# Patient Record
Sex: MALE | Race: WHITE | NOT HISPANIC OR LATINO | Employment: PART TIME | URBAN - METROPOLITAN AREA
[De-identification: names, ages, dates, MRNs, and addresses within clinical notes are randomized per-mention and may not be internally consistent; named-entity substitution may affect disease eponyms.]

---

## 2020-02-15 ENCOUNTER — OFFICE VISIT (OUTPATIENT)
Dept: URGENT CARE | Facility: CLINIC | Age: 65
End: 2020-02-15
Payer: COMMERCIAL

## 2020-02-15 VITALS
HEIGHT: 73 IN | RESPIRATION RATE: 18 BRPM | TEMPERATURE: 98.4 F | BODY MASS INDEX: 31.14 KG/M2 | OXYGEN SATURATION: 99 % | SYSTOLIC BLOOD PRESSURE: 167 MMHG | WEIGHT: 235 LBS | DIASTOLIC BLOOD PRESSURE: 81 MMHG | HEART RATE: 73 BPM

## 2020-02-15 DIAGNOSIS — J06.9 VIRAL URI: Primary | ICD-10-CM

## 2020-02-15 PROCEDURE — 99203 OFFICE O/P NEW LOW 30 MIN: CPT | Performed by: EMERGENCY MEDICINE

## 2020-02-15 RX ORDER — LISINOPRIL 10 MG/1
TABLET ORAL
COMMUNITY
Start: 2020-02-04 | End: 2021-01-05 | Stop reason: HOSPADM

## 2020-02-15 RX ORDER — PREDNISONE 10 MG/1
TABLET ORAL
Qty: 27 TABLET | Refills: 0 | Status: SHIPPED | OUTPATIENT
Start: 2020-02-15 | End: 2021-01-02 | Stop reason: CLARIF

## 2020-02-15 RX ORDER — AZITHROMYCIN 250 MG/1
TABLET, FILM COATED ORAL
Qty: 6 TABLET | Refills: 0 | Status: SHIPPED | OUTPATIENT
Start: 2020-02-15 | End: 2020-02-15 | Stop reason: CLARIF

## 2020-02-15 NOTE — PATIENT INSTRUCTIONS
You have been diagnosed with a Viral Upper Respiratory infection and your symptoms should resolve over the next 7 to 10 days with the treatments recommended today   If they do not, it is possible that you have developed a bacterial infection and you should return  If you were to take the antibiotic while you are still in the viral stage, you will not get better any faster, but could kill off the good germs in your body as well as make the germs in you resistant to the antibiotic  Take an expectorant - guaifenesin should be the only ingredient - during the day, and the cough suppressant (ex  Robitussin DM or Tessalon) if needed at night only  Take Zinc 12 5 to 15 mg every 2 - 3 hrs while awake for the next few days   You may take Cold Harvinder (13 3 mg of Zinc) or split a 25 mg Zinc tablet or lozenge in two or a 50 mg into four to get the proper dose   The total daily dose of Zinc should exceed 75 mg per day  You may also take a decongestant like Sudafed, unless you have hypertension or cardiac disease  Hold any NSAID's like Ibuprofen (Advil), Naprosyn (Aleve), etc while on steroids like Medrol or Prednisone  If you are diabetic, you should also adhere strictly to your diet and monitor your blood sugar closely while on the steroids as discussed  Upper Respiratory Infection   AMBULATORY CARE:   An upper respiratory infection  is also called a common cold  It can affect your nose, throat, ears, and sinuses  Common signs and symptoms include the following:  Cold symptoms are usually worst for the first 3 to 5 days  You may have any of the following:  · Runny or stuffy nose    · Sneezing and coughing    · Sore throat or hoarseness    · Red, watery, and sore eyes    · Fatigue     · Chills and fever    · Headache, body aches, or sore muscles  Seek care immediately if:   · You have chest pain or trouble breathing  Contact your healthcare provider if:   · You have a fever over 102ºF (39°C)      · Your sore throat gets worse or you see white or yellow spots in your throat  · Your symptoms get worse after 3 to 5 days or your cold is not better in 14 days  · You have a rash anywhere on your skin  · You have large, tender lumps in your neck  · You have thick, green or yellow drainage from your nose  · You cough up thick yellow, green, or bloody mucus  · You have vomiting for more than 24 hours and cannot keep fluids down  · You have a bad earache  · You have questions or concerns about your condition or care  Treatment for a cold: There is no cure for the common cold  Colds are caused by viruses and do not get better with antibiotics  Most people get better in 7 to 14 days  You may continue to cough for 2 to 3 weeks  The following may help decrease your symptoms:  · Decongestants  help reduce nasal congestion and help you breathe more easily  If you take decongestant pills, they may make you feel restless or not able to sleep  Do not use decongestant sprays for more than a few days  · Cough suppressants  help reduce coughing  Ask your healthcare provider which type of cough medicine is best for you  · NSAIDs , such as ibuprofen, help decrease swelling, pain, and fever  NSAIDs can cause stomach bleeding or kidney problems in certain people  If you take blood thinner medicine, always ask your healthcare provider if NSAIDs are safe for you  Always read the medicine label and follow directions  · Acetaminophen  decreases pain and fever  It is available without a doctor's order  Ask how much to take and how often to take it  Follow directions  Read the labels of all other medicines you are using to see if they also contain acetaminophen, or ask your doctor or pharmacist  Acetaminophen can cause liver damage if not taken correctly  Do not use more than 4 grams (4,000 milligrams) total of acetaminophen in one day  Manage your cold:   · Rest as much as possible  Slowly start to do more each day  · Drink more liquids as directed  Liquids will help thin and loosen mucus so you can cough it up  Liquids will also help prevent dehydration  Liquids that help prevent dehydration include water, fruit juice, and broth  Do not drink liquids that contain caffeine  Caffeine can increase your risk for dehydration  Ask your healthcare provider how much liquid to drink each day  · Soothe a sore throat  Gargle with warm salt water  This helps your sore throat feel better  Make salt water by dissolving ¼ teaspoon salt in 1 cup warm water  You may also suck on hard candy or throat lozenges  You may use a sore throat spray  · Use a humidifier or vaporizer  Use a cool mist humidifier or a vaporizer to increase air moisture in your home  This may make it easier for you to breathe and help decrease your cough  · Use saline nasal drops as directed  These help relieve congestion  · Apply petroleum-based jelly around the outside of your nostrils  This can decrease irritation from blowing your nose  · Do not smoke  Nicotine and other chemicals in cigarettes and cigars can make your symptoms worse  They can also cause infections such as bronchitis or pneumonia  Ask your healthcare provider for information if you currently smoke and need help to quit  E-cigarettes or smokeless tobacco still contain nicotine  Talk to your healthcare provider before you use these products  Prevent spreading your cold to others:   · Try to stay away from other people during the first 2 to 3 days of your cold when it is more easily spread  · Do not share food or drinks  · Do not share hand towels with household members  · Wash your hands often, especially after you blow your nose  Turn away from other people and cover your mouth and nose with a tissue when you sneeze or cough  Follow up with your healthcare provider as directed:  Write down your questions so you remember to ask them during your visits     © 2017 [facility] Information is for End User's use only and may not be sold, redistributed or otherwise used for commercial purposes  All illustrations and images included in CareNotes® are the copyrighted property of [employer]  or [employer] Batista  The above information is an  only  It is not intended as medical advice for individual conditions or treatments  Talk to your doctor, nurse or pharmacist before following any medical regimen to see if it is safe and effective for you

## 2021-01-02 ENCOUNTER — HOSPITAL ENCOUNTER (INPATIENT)
Facility: HOSPITAL | Age: 66
LOS: 3 days | Discharge: HOME | DRG: 309 | End: 2021-01-05
Attending: EMERGENCY MEDICINE | Admitting: FAMILY MEDICINE
Payer: COMMERCIAL

## 2021-01-02 ENCOUNTER — APPOINTMENT (EMERGENCY)
Dept: RADIOLOGY | Facility: HOSPITAL | Age: 66
DRG: 309 | End: 2021-01-02
Payer: COMMERCIAL

## 2021-01-02 DIAGNOSIS — I48.92 ATRIAL FLUTTER (HCC): Primary | ICD-10-CM

## 2021-01-02 DIAGNOSIS — N39.0 UTI (URINARY TRACT INFECTION): ICD-10-CM

## 2021-01-02 DIAGNOSIS — G47.30 SLEEP APNEA, UNSPECIFIED TYPE: ICD-10-CM

## 2021-01-02 DIAGNOSIS — N30.00 ACUTE CYSTITIS WITHOUT HEMATURIA: ICD-10-CM

## 2021-01-02 DIAGNOSIS — I48.92 ATRIAL FLUTTER WITH RAPID VENTRICULAR RESPONSE (HCC): ICD-10-CM

## 2021-01-02 PROBLEM — I16.0 HYPERTENSIVE URGENCY: Status: ACTIVE | Noted: 2021-01-02

## 2021-01-02 LAB
ALBUMIN SERPL BCP-MCNC: 3.4 G/DL (ref 3.5–5)
ALP SERPL-CCNC: 54 U/L (ref 46–116)
ALT SERPL W P-5'-P-CCNC: 42 U/L (ref 12–78)
ANION GAP SERPL CALCULATED.3IONS-SCNC: 10 MMOL/L (ref 4–13)
APTT PPP: 32 SECONDS (ref 23–37)
AST SERPL W P-5'-P-CCNC: 24 U/L (ref 5–45)
BACTERIA UR QL AUTO: ABNORMAL /HPF
BASOPHILS # BLD AUTO: 0.02 THOUSANDS/ΜL (ref 0–0.1)
BASOPHILS NFR BLD AUTO: 0 % (ref 0–1)
BILIRUB SERPL-MCNC: 0.24 MG/DL (ref 0.2–1)
BILIRUB UR QL STRIP: NEGATIVE
BUN SERPL-MCNC: 18 MG/DL (ref 5–25)
CALCIUM ALBUM COR SERPL-MCNC: 8.8 MG/DL (ref 8.3–10.1)
CALCIUM SERPL-MCNC: 8.3 MG/DL (ref 8.3–10.1)
CHLORIDE SERPL-SCNC: 102 MMOL/L (ref 100–108)
CK SERPL-CCNC: 72 U/L (ref 39–308)
CLARITY UR: ABNORMAL
CO2 SERPL-SCNC: 25 MMOL/L (ref 21–32)
COLOR UR: YELLOW
CREAT SERPL-MCNC: 1 MG/DL (ref 0.6–1.3)
EOSINOPHIL # BLD AUTO: 0.17 THOUSAND/ΜL (ref 0–0.61)
EOSINOPHIL NFR BLD AUTO: 2 % (ref 0–6)
ERYTHROCYTE [DISTWIDTH] IN BLOOD BY AUTOMATED COUNT: 12.3 % (ref 11.6–15.1)
GFR SERPL CREATININE-BSD FRML MDRD: 78 ML/MIN/1.73SQ M
GLUCOSE SERPL-MCNC: 128 MG/DL (ref 65–140)
GLUCOSE UR STRIP-MCNC: NEGATIVE MG/DL
HCT VFR BLD AUTO: 39.2 % (ref 36.5–49.3)
HGB BLD-MCNC: 13.5 G/DL (ref 12–17)
HGB UR QL STRIP.AUTO: ABNORMAL
IMM GRANULOCYTES # BLD AUTO: 0.07 THOUSAND/UL (ref 0–0.2)
IMM GRANULOCYTES NFR BLD AUTO: 1 % (ref 0–2)
INR PPP: 1.09 (ref 0.84–1.19)
KETONES UR STRIP-MCNC: NEGATIVE MG/DL
LACTATE SERPL-SCNC: 1.6 MMOL/L (ref 0.5–2)
LEUKOCYTE ESTERASE UR QL STRIP: ABNORMAL
LYMPHOCYTES # BLD AUTO: 1.71 THOUSANDS/ΜL (ref 0.6–4.47)
LYMPHOCYTES NFR BLD AUTO: 16 % (ref 14–44)
MAGNESIUM SERPL-MCNC: 2.1 MG/DL (ref 1.6–2.6)
MCH RBC QN AUTO: 30.3 PG (ref 26.8–34.3)
MCHC RBC AUTO-ENTMCNC: 34.4 G/DL (ref 31.4–37.4)
MCV RBC AUTO: 88 FL (ref 82–98)
MONOCYTES # BLD AUTO: 0.92 THOUSAND/ΜL (ref 0.17–1.22)
MONOCYTES NFR BLD AUTO: 9 % (ref 4–12)
MUCOUS THREADS UR QL AUTO: ABNORMAL
NEUTROPHILS # BLD AUTO: 7.9 THOUSANDS/ΜL (ref 1.85–7.62)
NEUTS SEG NFR BLD AUTO: 72 % (ref 43–75)
NITRITE UR QL STRIP: POSITIVE
NON-SQ EPI CELLS URNS QL MICRO: ABNORMAL /HPF
NRBC BLD AUTO-RTO: 0 /100 WBCS
OTHER STN SPEC: ABNORMAL
PH UR STRIP.AUTO: 5.5 [PH]
PLATELET # BLD AUTO: 197 THOUSANDS/UL (ref 149–390)
PMV BLD AUTO: 10.8 FL (ref 8.9–12.7)
POTASSIUM SERPL-SCNC: 4.2 MMOL/L (ref 3.5–5.3)
PROT SERPL-MCNC: 7.2 G/DL (ref 6.4–8.2)
PROT UR STRIP-MCNC: ABNORMAL MG/DL
PROTHROMBIN TIME: 13.9 SECONDS (ref 11.6–14.5)
RBC # BLD AUTO: 4.46 MILLION/UL (ref 3.88–5.62)
RBC #/AREA URNS AUTO: ABNORMAL /HPF
SODIUM SERPL-SCNC: 137 MMOL/L (ref 136–145)
SP GR UR STRIP.AUTO: 1.02 (ref 1–1.03)
TROPONIN I SERPL-MCNC: <0.02 NG/ML
UROBILINOGEN UR QL STRIP.AUTO: 0.2 E.U./DL
WBC # BLD AUTO: 10.79 THOUSAND/UL (ref 4.31–10.16)
WBC #/AREA URNS AUTO: ABNORMAL /HPF

## 2021-01-02 PROCEDURE — 99285 EMERGENCY DEPT VISIT HI MDM: CPT

## 2021-01-02 PROCEDURE — 99223 1ST HOSP IP/OBS HIGH 75: CPT | Performed by: FAMILY MEDICINE

## 2021-01-02 PROCEDURE — 83735 ASSAY OF MAGNESIUM: CPT | Performed by: PHYSICIAN ASSISTANT

## 2021-01-02 PROCEDURE — 82550 ASSAY OF CK (CPK): CPT | Performed by: PHYSICIAN ASSISTANT

## 2021-01-02 PROCEDURE — 99285 EMERGENCY DEPT VISIT HI MDM: CPT | Performed by: PHYSICIAN ASSISTANT

## 2021-01-02 PROCEDURE — 71045 X-RAY EXAM CHEST 1 VIEW: CPT

## 2021-01-02 PROCEDURE — 85610 PROTHROMBIN TIME: CPT | Performed by: PHYSICIAN ASSISTANT

## 2021-01-02 PROCEDURE — 87086 URINE CULTURE/COLONY COUNT: CPT | Performed by: PHYSICIAN ASSISTANT

## 2021-01-02 PROCEDURE — 81001 URINALYSIS AUTO W/SCOPE: CPT | Performed by: PHYSICIAN ASSISTANT

## 2021-01-02 PROCEDURE — 84484 ASSAY OF TROPONIN QUANT: CPT | Performed by: PHYSICIAN ASSISTANT

## 2021-01-02 PROCEDURE — 36415 COLL VENOUS BLD VENIPUNCTURE: CPT | Performed by: PHYSICIAN ASSISTANT

## 2021-01-02 PROCEDURE — 96376 TX/PRO/DX INJ SAME DRUG ADON: CPT

## 2021-01-02 PROCEDURE — 96365 THER/PROPH/DIAG IV INF INIT: CPT

## 2021-01-02 PROCEDURE — 80053 COMPREHEN METABOLIC PANEL: CPT | Performed by: PHYSICIAN ASSISTANT

## 2021-01-02 PROCEDURE — 83605 ASSAY OF LACTIC ACID: CPT | Performed by: FAMILY MEDICINE

## 2021-01-02 PROCEDURE — 85730 THROMBOPLASTIN TIME PARTIAL: CPT | Performed by: PHYSICIAN ASSISTANT

## 2021-01-02 PROCEDURE — 93005 ELECTROCARDIOGRAM TRACING: CPT

## 2021-01-02 PROCEDURE — 96375 TX/PRO/DX INJ NEW DRUG ADDON: CPT

## 2021-01-02 PROCEDURE — 85025 COMPLETE CBC W/AUTO DIFF WBC: CPT | Performed by: PHYSICIAN ASSISTANT

## 2021-01-02 RX ORDER — PHENAZOPYRIDINE HYDROCHLORIDE 100 MG/1
100 TABLET, FILM COATED ORAL
Status: DISCONTINUED | OUTPATIENT
Start: 2021-01-02 | End: 2021-01-05 | Stop reason: HOSPADM

## 2021-01-02 RX ORDER — METOPROLOL TARTRATE 50 MG/1
50 TABLET, FILM COATED ORAL 3 TIMES DAILY
Status: DISCONTINUED | OUTPATIENT
Start: 2021-01-02 | End: 2021-01-03

## 2021-01-02 RX ORDER — DILTIAZEM HYDROCHLORIDE 5 MG/ML
15 INJECTION INTRAVENOUS ONCE
Status: COMPLETED | OUTPATIENT
Start: 2021-01-02 | End: 2021-01-02

## 2021-01-02 RX ORDER — METOPROLOL TARTRATE 5 MG/5ML
5 INJECTION INTRAVENOUS ONCE
Status: COMPLETED | OUTPATIENT
Start: 2021-01-02 | End: 2021-01-02

## 2021-01-02 RX ORDER — ACETAMINOPHEN 325 MG/1
650 TABLET ORAL EVERY 6 HOURS PRN
Status: DISCONTINUED | OUTPATIENT
Start: 2021-01-02 | End: 2021-01-05 | Stop reason: HOSPADM

## 2021-01-02 RX ORDER — CEFTRIAXONE 1 G/50ML
1000 INJECTION, SOLUTION INTRAVENOUS EVERY 24 HOURS
Status: DISCONTINUED | OUTPATIENT
Start: 2021-01-03 | End: 2021-01-04

## 2021-01-02 RX ORDER — CEFTRIAXONE 1 G/50ML
1000 INJECTION, SOLUTION INTRAVENOUS ONCE
Status: COMPLETED | OUTPATIENT
Start: 2021-01-02 | End: 2021-01-02

## 2021-01-02 RX ORDER — ONDANSETRON 2 MG/ML
4 INJECTION INTRAMUSCULAR; INTRAVENOUS EVERY 6 HOURS PRN
Status: DISCONTINUED | OUTPATIENT
Start: 2021-01-02 | End: 2021-01-05 | Stop reason: HOSPADM

## 2021-01-02 RX ADMIN — METOPROLOL TARTRATE 50 MG: 50 TABLET, FILM COATED ORAL at 21:04

## 2021-01-02 RX ADMIN — DILTIAZEM HYDROCHLORIDE 5 MG/HR: 5 INJECTION INTRAVENOUS at 13:05

## 2021-01-02 RX ADMIN — DILTIAZEM HYDROCHLORIDE 15 MG/HR: 5 INJECTION INTRAVENOUS at 16:11

## 2021-01-02 RX ADMIN — DILTIAZEM HYDROCHLORIDE 15 MG: 5 INJECTION INTRAVENOUS at 11:38

## 2021-01-02 RX ADMIN — APIXABAN 5 MG: 5 TABLET, FILM COATED ORAL at 16:30

## 2021-01-02 RX ADMIN — SODIUM CHLORIDE 1000 ML: 0.9 INJECTION, SOLUTION INTRAVENOUS at 11:42

## 2021-01-02 RX ADMIN — METOPROLOL TARTRATE 50 MG: 50 TABLET, FILM COATED ORAL at 18:28

## 2021-01-02 RX ADMIN — DILTIAZEM HYDROCHLORIDE 15 MG: 5 INJECTION INTRAVENOUS at 12:22

## 2021-01-02 RX ADMIN — PHENAZOPYRIDINE 100 MG: 100 TABLET ORAL at 16:30

## 2021-01-02 RX ADMIN — CEFTRIAXONE 1000 MG: 1 INJECTION, SOLUTION INTRAVENOUS at 11:52

## 2021-01-02 RX ADMIN — DILTIAZEM HYDROCHLORIDE 10 MG/HR: 5 INJECTION INTRAVENOUS at 21:34

## 2021-01-02 RX ADMIN — METOPROLOL TARTRATE 5 MG: 5 INJECTION INTRAVENOUS at 12:19

## 2021-01-02 RX ADMIN — METOPROLOL TARTRATE 5 MG: 5 INJECTION INTRAVENOUS at 14:16

## 2021-01-02 RX ADMIN — METOPROLOL TARTRATE 5 MG: 5 INJECTION INTRAVENOUS at 12:57

## 2021-01-02 NOTE — H&P
H&P- Gaudencio Espinoza [birth year], 76 y o  male MRN: [id number]    Unit/Bed#: ED 05 Encounter: [id number]    Primary Care Provider: Hannah Navarrete MD   Date and time admitted to hospital: 1/2/2021 10:38 AM        * Atrial flutter with rapid ventricular response [facility])  Assessment & Plan  New onset a flutter  Unclear when it started as patient is asymptomatic and it was discovered incidentally on urgent care visit  Heart rate 137 a flutter on admission  Receive several doses of IV Cardizem and now currently on a Cardizem drip  Will continue to titrate to keep heart rate less than 120  Also initiated on metoprolol 25 mg q 6  Cardiology consulted  Will place on Eliquis 5 mg twice daily for anticoagulation  2D echo to be ordered for Monday  Patient admitted to level 2 step-down as Cardizem drip will need to be titrated     Hypertensive urgency  Assessment & Plan  Patient had elevated blood pressure on presentation  Now received several doses of Cardizem following which it has improved  Hold lisinopril for now and continue Cardizem and metoprolol    Acute cystitis without hematuria  Assessment & Plan  Patient presented with acute cystitis without hematuria for the last 3-4 days  Was taking over-the-counter supplements to help with the dysuria  UA ordered with reflex to culture  Placed on IV ceftriaxone for now      VTE Prophylaxis: Apixaban (Eliquis)  / sequential compression device   Code Status:  Full code  POLST: There is no POLST form on file for this patient (pre-hospital)  Discussion with family:  None    Anticipated Length of Stay:  Patient will be admitted on an Inpatient basis with an anticipated length of stay of  more than 2 midnights  Justification for Hospital Stay:  A flutter with RVR    Total Time for Visit, including Counseling / Coordination of Care: 1 hour  Greater than 50% of this total time spent on direct patient counseling and coordination of care      Chief Complaint: Dysuria    History of Present Illness:    Sally Barbour is a 76 y o  male who presents with dysuria  Patient states for the last 2-3 days he has been having increased frequency of urination and dysuria  He went to the urgent care center and was found to have new onset a flutter with RVR and sent to the ER for further evaluation  Patient denies any chest pain or shortness of breath but does complain of increased fatigue and under lot of stress due to work and the holidays  Denies any previous history of any arrhythmia  Only takes lisinopril at home for blood pressure  Denies any sick contacts  Review of Systems:    Review of Systems   Constitutional: Positive for fatigue  Negative for appetite change, chills and fever  HENT: Negative for hearing loss, sore throat and trouble swallowing  Eyes: Negative for photophobia, discharge and visual disturbance  Respiratory: Negative for chest tightness and shortness of breath  Cardiovascular: Negative for chest pain and palpitations  Gastrointestinal: Negative for abdominal pain, blood in stool and vomiting  Endocrine: Negative for polydipsia and polyuria  Genitourinary: Positive for dysuria and urgency  Negative for difficulty urinating, flank pain and hematuria  Musculoskeletal: Negative for back pain and gait problem  Skin: Negative for rash  Allergic/Immunologic: Negative for environmental allergies and food allergies  Neurological: Negative for dizziness, seizures, syncope and headaches  Hematological: Does not bruise/bleed easily  Psychiatric/Behavioral: Negative for behavioral problems  All other systems reviewed and are negative        Past Medical and Surgical History:     Past Medical History:   Diagnosis Date    Hypertension        Past Surgical History:   Procedure Laterality Date    NO PAST SURGERIES      WISDOM TOOTH EXTRACTION         Meds/Allergies:    Prior to Admission medications    Medication Sig Start Date End Date Taking? Authorizing Provider   lisinopril (ZESTRIL) 10 mg tablet  2/4/20  Yes Historical Provider, MD   predniSONE 10 mg tablet Take once daily all days pills on this schedule 6- 6- 5- 4- 3- 2- 1  Patient not taking: Reported on 1/2/2021 2/15/20 1/2/21  Tierra Malin MD     I have reviewed home medications with patient personally  Allergies: No Known Allergies    Social History:     Marital Status: /Civil Union   Social History     Substance and Sexual Activity   Alcohol Use Not Currently     Social History     Tobacco Use   Smoking Status Never Smoker   Smokeless Tobacco Never Used     Social History     Substance and Sexual Activity   Drug Use Not Currently       Family History:    Family History   Problem Relation Age of Onset    No Known Problems Mother     Hypertension Father        Physical Exam:     Vitals:   Blood Pressure: 134/98 (01/02/21 1445)  Pulse: (!) 127 (01/02/21 1445)  Respirations: 16 (01/02/21 1402)  Height: 6' (182 9 cm) (01/02/21 1044)  Weight - Scale: 111 kg (244 lb) (01/02/21 1044)  SpO2: 99 % (01/02/21 1445)    Physical Exam  Vitals signs and nursing note reviewed  Constitutional:       Appearance: Normal appearance  HENT:      Head: Normocephalic and atraumatic  Right Ear: External ear normal       Left Ear: External ear normal       Nose: Nose normal       Mouth/Throat:      Pharynx: Oropharynx is clear  Neck:      Musculoskeletal: Normal range of motion and neck supple  Cardiovascular:      Rate and Rhythm: Regular rhythm  Tachycardia present  Heart sounds: Normal heart sounds  Pulmonary:      Effort: Pulmonary effort is normal       Breath sounds: Normal breath sounds  Abdominal:      General: Bowel sounds are normal       Palpations: Abdomen is soft  Tenderness: There is abdominal tenderness  Comments: Mild tenderness in the suprapubic region   Musculoskeletal: Normal range of motion  Skin:     General: Skin is warm and dry  Capillary Refill: Capillary refill takes less than 2 seconds  Neurological:      General: No focal deficit present  Mental Status: He is alert and oriented to person, place, and time  Psychiatric:         Mood and Affect: Mood normal              Additional Data:     Lab Results: I have personally reviewed pertinent reports  Results from last 7 days   Lab Units 01/02/21  1102   WBC Thousand/uL 10 79*   HEMOGLOBIN g/dL 13 5   HEMATOCRIT % 39 2   PLATELETS Thousands/uL 197   NEUTROS PCT % 72   LYMPHS PCT % 16   MONOS PCT % 9   EOS PCT % 2     Results from last 7 days   Lab Units 01/02/21  1102   SODIUM mmol/L 137   POTASSIUM mmol/L 4 2   CHLORIDE mmol/L 102   CO2 mmol/L 25   BUN mg/dL 18   CREATININE mg/dL 1 00   ANION GAP mmol/L 10   CALCIUM mg/dL 8 3   ALBUMIN g/dL 3 4*   TOTAL BILIRUBIN mg/dL 0 24   ALK PHOS U/L 54   ALT U/L 42   AST U/L 24   GLUCOSE RANDOM mg/dL 128     Results from last 7 days   Lab Units 01/02/21  1144   INR  1 09                   Imaging: I have personally reviewed pertinent reports  XR chest 1 view portable    (Results Pending)       EKG, Pathology, and Other Studies Reviewed on Admission:   · EKG:  A flutter with RVR    Allscripts / Epic Records Reviewed: Yes     ** Please Note: This note has been constructed using a voice recognition system   **

## 2021-01-02 NOTE — ASSESSMENT & PLAN NOTE
Patient presented with acute cystitis without hematuria for the last 3-4 days  Was taking over-the-counter supplements to help with the dysuria    UA ordered with reflex to culture  Placed on IV ceftriaxone for now

## 2021-01-02 NOTE — ED PROVIDER NOTES
History  Chief Complaint   Patient presents with    Abnormal ECG     Pt presented to the [facility] with a possible uti, pt had ecg performed due to a HR of 133, found pt to be in aflutter, sent to the ED for eval     76-year-old male presents the emergency department from urgent care due to abnormal EKG  Patient went to urgent care this morning due to increased urinary frequency, cloudy urine, dysuria onset Wednesday night  Denies any abdominal pain, penile pain, testicular pain or swelling  Patient was diagnosed with UTI at urgent care not started on any antibiotics  While at urgent care patient had EKG concerning for a flutter  Patient states he has never had this previously  He denies any shortness of breath, palpitations, chest pain, nausea, vomiting, diaphoresis  Patient reports hypertension denies any other cardiac history  History provided by:  Patient  Medical Problem  Location:  A  flutter  Associated symptoms: no abdominal pain, no chest pain, no congestion, no cough, no diarrhea, no ear pain, no fatigue, no fever, no headaches, no loss of consciousness, no myalgias, no nausea, no rash, no rhinorrhea, no shortness of breath, no sore throat, no vomiting and no wheezing        Prior to Admission Medications   Prescriptions Last Dose Informant Patient Reported? Taking?   lisinopril (ZESTRIL) 10 mg tablet 1/2/2021 at Unknown time  Yes Yes      Facility-Administered Medications: None       Past Medical History:   Diagnosis Date    Hypertension        Past Surgical History:   Procedure Laterality Date    NO PAST SURGERIES      WISDOM TOOTH EXTRACTION         Family History   Problem Relation Age of Onset    No Known Problems Mother     Hypertension Father      I have reviewed and agree with the history as documented      E-Cigarette/Vaping     E-Cigarette/Vaping Substances     Social History     Tobacco Use    Smoking status: Never Smoker    Smokeless tobacco: Never Used Substance Use Topics    Alcohol use: Not Currently    Drug use: Not Currently       Review of Systems   Constitutional: Negative  Negative for appetite change, chills, diaphoresis, fatigue and fever  HENT: Negative  Negative for congestion, ear pain, rhinorrhea and sore throat  Respiratory: Negative  Negative for cough, choking, chest tightness, shortness of breath, wheezing and stridor  Cardiovascular: Negative  Negative for chest pain, palpitations and leg swelling  Gastrointestinal: Negative  Negative for abdominal pain, anal bleeding, blood in stool, constipation, diarrhea, nausea, rectal pain and vomiting  Genitourinary: Positive for dysuria, frequency and hematuria (Noted today at urgent care)  Negative for decreased urine volume, difficulty urinating, discharge, enuresis, flank pain, genital sores, penile pain, penile swelling, scrotal swelling, testicular pain and urgency  Musculoskeletal: Negative  Negative for myalgias  Skin: Negative  Negative for color change, pallor, rash and wound  Neurological: Negative  Negative for loss of consciousness and headaches  All other systems reviewed and are negative  Physical Exam  Physical Exam  Vitals signs and nursing note reviewed  Constitutional:       General: He is not in acute distress  Appearance: Normal appearance  He is normal weight  He is not ill-appearing, toxic-appearing or diaphoretic  HENT:      Head: Normocephalic  Nose: Nose normal  No congestion or rhinorrhea  Mouth/Throat:      Mouth: Mucous membranes are moist       Pharynx: Oropharynx is clear  No oropharyngeal exudate or posterior oropharyngeal erythema  Eyes:      Extraocular Movements: Extraocular movements intact  Conjunctiva/sclera: Conjunctivae normal       Pupils: Pupils are equal, round, and reactive to light  Neck:      Musculoskeletal: Normal range of motion and neck supple  No muscular tenderness     Cardiovascular:      Rate and Rhythm: Regular rhythm  Tachycardia present  Pulses:           Radial pulses are 2+ on the right side and 2+ on the left side  Dorsalis pedis pulses are 2+ on the right side and 2+ on the left side  Posterior tibial pulses are 2+ on the right side and 2+ on the left side  Pulmonary:      Effort: Pulmonary effort is normal  No respiratory distress  Breath sounds: Normal breath sounds  No stridor  No wheezing, rhonchi or rales  Chest:      Chest wall: No tenderness  Abdominal:      General: Abdomen is flat  Bowel sounds are normal  There is no distension  Palpations: Abdomen is soft  Tenderness: There is no abdominal tenderness  There is no right CVA tenderness, left CVA tenderness or guarding  Musculoskeletal: Normal range of motion  General: No swelling, tenderness or deformity  Skin:     General: Skin is warm and dry  Capillary Refill: Capillary refill takes less than 2 seconds  Coloration: Skin is not pale  Findings: No bruising, erythema or rash  Neurological:      General: No focal deficit present  Mental Status: He is alert and oriented to person, place, and time  Sensory: No sensory deficit  Motor: No weakness  Coordination: Coordination normal       Deep Tendon Reflexes: Reflexes normal    Psychiatric:         Mood and Affect: Mood normal          Behavior: Behavior normal          Thought Content:  Thought content normal          Judgment: Judgment normal          Vital Signs  ED Triage Vitals [01/02/21 1044]   Temp Pulse Respirations Blood Pressure SpO2   -- (!) 133 20 (!) 179/102 100 %      Temp src Heart Rate Source Patient Position - Orthostatic VS BP Location FiO2 (%)   -- Monitor Lying Left arm --      Pain Score       No Pain           Vitals:    01/02/21 1415 01/02/21 1445 01/02/21 1500 01/02/21 1533   BP: 143/100 134/98 136/98 132/87   Pulse: (!) 126 (!) 127 (!) 127 (!) 128   Patient Position - Orthostatic VS:    Sitting         Visual Acuity      ED Medications  Medications   cefTRIAXone (ROCEPHIN) IVPB (premix in dextrose) 1,000 mg 50 mL (has no administration in time range)   diltiazem (CARDIZEM) 125 mg in sodium chloride 0 9 % 125 mL infusion (15 mg/hr Intravenous New Bag 1/2/21 1611)   apixaban (ELIQUIS) tablet 5 mg (5 mg Oral Given 1/2/21 1630)   acetaminophen (TYLENOL) tablet 650 mg (has no administration in time range)   ondansetron (ZOFRAN) injection 4 mg (has no administration in time range)   phenazopyridine (PYRIDIUM) tablet 100 mg (100 mg Oral Given 1/2/21 1630)   metoprolol tartrate (LOPRESSOR) tablet 50 mg (has no administration in time range)   diltiazem (CARDIZEM) injection 15 mg (15 mg Intravenous Given 1/2/21 1138)   sodium chloride 0 9 % bolus 1,000 mL (0 mL Intravenous Stopped 1/2/21 1254)   cefTRIAXone (ROCEPHIN) IVPB (premix in dextrose) 1,000 mg 50 mL (0 mg Intravenous Stopped 1/2/21 1235)   diltiazem (CARDIZEM) injection 15 mg (15 mg Intravenous Given 1/2/21 1222)   metoprolol (LOPRESSOR) injection 5 mg (5 mg Intravenous Given 1/2/21 1219)   metoprolol (LOPRESSOR) injection 5 mg (5 mg Intravenous Given 1/2/21 1257)   diltiazem (CARDIZEM) 125 mg in sodium chloride 0 9 % 125 mL infusion (0 mg/hr Intravenous Stopped 1/2/21 1612)   metoprolol (LOPRESSOR) injection 5 mg (5 mg Intravenous Given 1/2/21 1416)       Diagnostic Studies  Results Reviewed     Procedure Component Value Units Date/Time    Lactic acid, plasma [[id number]]     Lab Status: No result Specimen: Blood     Protime-INR [[id number]]  (Normal) Collected: 01/02/21 1144    Lab Status: Final result Specimen: Blood from Arm, Right Updated: 01/02/21 1212     Protime 13 9 seconds      INR 1 09    APTT [[id number]]  (Normal) Collected: 01/02/21 1144    Lab Status: Final result Specimen: Blood from Arm, Right Updated: 01/02/21 1212     PTT 32 seconds     Troponin I [[id number]]  (Normal) Collected: 01/02/21 1102    Lab Status: Final result Specimen: Blood from Arm, Right Updated: 01/02/21 1135     Troponin I <0 02 ng/mL     Magnesium [[id number]]  (Normal) Collected: 01/02/21 1102    Lab Status: Final result Specimen: Blood from Arm, Right Updated: 01/02/21 1135     Magnesium 2 1 mg/dL     CK Total with Reflex CKMB [[id number]]  (Normal) Collected: 01/02/21 1102    Lab Status: Final result Specimen: Blood from Arm, Right Updated: 01/02/21 1135     Total CK 72 U/L     Comprehensive metabolic panel [[id number]]  (Abnormal) Collected: 01/02/21 1102    Lab Status: Final result Specimen: Blood from Arm, Right Updated: 01/02/21 1134     Sodium 137 mmol/L      Potassium 4 2 mmol/L      Chloride 102 mmol/L      CO2 25 mmol/L      ANION GAP 10 mmol/L      BUN 18 mg/dL      Creatinine 1 00 mg/dL      Glucose 128 mg/dL      Calcium 8 3 mg/dL      Corrected Calcium 8 8 mg/dL      AST 24 U/L      ALT 42 U/L      Alkaline Phosphatase 54 U/L      Total Protein 7 2 g/dL      Albumin 3 4 g/dL      Total Bilirubin 0 24 mg/dL      eGFR 78 ml/min/1 73sq m     Narrative:      [place] guidelines for Chronic Kidney Disease (CKD):     Stage 1 with normal or high GFR (GFR > 90 mL/min/1 73 square meters)    Stage 2 Mild CKD (GFR = 60-89 mL/min/1 73 square meters)    Stage 3A Moderate CKD (GFR = 45-59 mL/min/1 73 square meters)    Stage 3B Moderate CKD (GFR = 30-44 mL/min/1 73 square meters)    Stage 4 Severe CKD (GFR = 15-29 mL/min/1 73 square meters)    Stage 5 End Stage CKD (GFR <15 mL/min/1 73 square meters)  Note: GFR calculation is accurate only with a steady state creatinine    Urine Microscopic [[id number]]  (Abnormal) Collected: 01/02/21 1107    Lab Status: Final result Specimen: Urine, Clean Catch Updated: 01/02/21 1126     RBC, UA Innumerable /hpf      WBC, UA 30-50 /hpf      Epithelial Cells Occasional /hpf      Bacteria, UA Innumerable /hpf      OTHER OBSERVATIONS Yeast Cells Present     MUCUS THREADS Occasional    Urine culture [[id number]] Collected: 01/02/21 1107    Lab Status: In process Specimen: Urine, Clean Catch Updated: 01/02/21 1126    UA w Reflex to Microscopic w Reflex to Culture [[id number]]  (Abnormal) Collected: 01/02/21 1107    Lab Status: Final result Specimen: Urine, Clean Catch Updated: 01/02/21 1117     Color, UA Yellow     Clarity, UA Cloudy     Specific Gravity, UA 1 025     pH, UA 5 5     Leukocytes, UA Small     Nitrite, UA Positive     Protein,  (2+) mg/dl      Glucose, UA Negative mg/dl      Ketones, UA Negative mg/dl      Urobilinogen, UA 0 2 E U /dl      Bilirubin, UA Negative     Blood, UA Large    CBC and differential [[id number]]  (Abnormal) Collected: 01/02/21 1102    Lab Status: Final result Specimen: Blood from Arm, Right Updated: 01/02/21 1114     WBC 10 79 Thousand/uL      RBC 4 46 Million/uL      Hemoglobin 13 5 g/dL      Hematocrit 39 2 %      MCV 88 fL      MCH 30 3 pg      MCHC 34 4 g/dL      RDW 12 3 %      MPV 10 8 fL      Platelets 038 Thousands/uL      nRBC 0 /100 WBCs      Neutrophils Relative 72 %      Immat GRANS % 1 %      Lymphocytes Relative 16 %      Monocytes Relative 9 %      Eosinophils Relative 2 %      Basophils Relative 0 %      Neutrophils Absolute 7 90 Thousands/µL      Immature Grans Absolute 0 07 Thousand/uL      Lymphocytes Absolute 1 71 Thousands/µL      Monocytes Absolute 0 92 Thousand/µL      Eosinophils Absolute 0 17 Thousand/µL      Basophils Absolute 0 02 Thousands/µL                  XR chest 1 view portable   Final Result by Cheryl Becerril MD (01/02 1541)      No acute cardiopulmonary disease                    Workstation performed: [id number]                    Procedures  ECG 12 Lead Documentation Only    Date/Time: 1/2/2021 10:46 AM  Performed by: Megha Wahl PA-C  Authorized by: Megha Wahl PA-C     Indications / Diagnosis:  Abnormal previous EKG  Patient location:  ED  Interpretation:     Interpretation: non-specific    Rate:     ECG rate:  133    ECG rate assessment: tachycardic    Rhythm:     Rhythm: A-V block and atrial flutter    Ectopy:     Ectopy: none    QRS:     QRS axis:  Normal    QRS intervals:  Normal  Conduction:     Conduction: normal    ST segments:     ST segments:  Normal  T waves:     T waves: normal               ED Course  ED Course as of Jan 02 1808   Sat Jan 02, 2021   1046 Personal interpretation of EKG:  Atrial flutter with 2-1 AV block, no acute it ischemic changes, ventricular rate 133 beats per minute      1126 UA + for UTI - will give dose of IV Rocephin  Minimal leukocytosis - 10 79      1134 CMP relatively      1136 Normal CK and Mag levels  Troponin <0 02      1205 Patient received 15 IV Cardizem approximately 30 minutes ago  Heart rate continues to be 132 beats per minute  Will trial dose of Lopressor and 2nd dose of Cardizem      1243 HR currently 127 after second dose of cardizem and 1st dose of lopressor  Second dose of lopressor and cardizem drip ordered  1247 TT sent to cardiology      1253 Dr Robyn Aguirre cardiology agrees with diltiazem drip for heart rate control  Will talk to hospitalist for admission      1300 I discussed results and findings with patient  Patient is agreeable inpatient treatment  I discussed with hospitalist Dr Fe Morton who accepted patient has stepped down 2        [id number] Patient continues with elevated heart rate, heart rate currently 126  Will give 3rd dose of IV Lopressor patient on Cardizem drip  SBIRT 22yo+      Most Recent Value   SBIRT (24 yo +)   In order to provide better care to our patients, we are screening all of our patients for alcohol and drug use  Would it be okay to ask you these screening questions? Yes Filed at: 01/02/2021 1053   Initial Alcohol Screen: US AUDIT-C    1  How often do you have a drink containing alcohol?  0 Filed at: 01/02/2021 1053   2  How many drinks containing alcohol do you have on a typical day you are drinking?    0 Filed at: 01/02/2021 1053   3a  Male UNDER 65: How often do you have five or more drinks on one occasion? 0 Filed at: 01/02/2021 1053   3b  FEMALE Any Age, or MALE 65+: How often do you have 4 or more drinks on one occassion? 0 Filed at: 01/02/2021 1053   Audit-C Score  0 Filed at: 01/02/2021 1053   MOE: How many times in the past year have you    Used an illegal drug or used a prescription medication for non-medical reasons? Never Filed at: 01/02/2021 1053                    MDM  Number of Diagnoses or Management Options  Atrial flutter [facility]): new and requires workup  UTI (urinary tract infection): new and requires workup  Diagnosis management comments: 77-year-old male presented earlier today to urgent care for evaluation of urinary frequency and dysuria concern for UTI  Patient was found have UTI as well as atrial flutter on EKG  Patient is asymptomatic with a flutter  States he has never had anything like this before  Vitals and medical record reviewed  On exam patient is tachycardic  Lungs clear auscultation  Abdomen soft nontender, nondistended  Patient started on Rocephin for UTI  Patient's heart rate remained elevated despite Cardizem and Lopressor patient was started on Cardizem drip  Discussed with cardiology who recommended admission  Discussed with hospitalist who agreed with this treatment plan  Patient was accepted under Dr Corey Mederos    Patient agreed with this treatment plan and was admitted       Amount and/or Complexity of Data Reviewed  Clinical lab tests: ordered and reviewed  Tests in the radiology section of CPT®: ordered and reviewed  Review and summarize past medical records: yes  Discuss the patient with other providers: yes  Independent visualization of images, tracings, or specimens: yes        Disposition  Final diagnoses:   Atrial flutter ([address] )   UTI (urinary tract infection)     Time reflects when diagnosis was documented in both MDM as applicable and the Disposition within this note Time User Action Codes Description Comment    1/2/2021 12:57 PM Felicie Lauren Add [I48 92] Atrial flutter ([address] )     1/2/2021 12:57 PM Felicie Lauren Add [N39 0] UTI (urinary tract infection)     1/2/2021  3:09 PM Venetta Nickel Add [I48 92] Atrial flutter with rapid ventricular response [facility])       ED Disposition     ED Disposition Condition Date/Time Comment    Admit Stable Sat Jan 2, 2021 12:57 PM Case was discussed with Dr Yenifer Vieira and the patient's admission status was agreed to be Admission Status: inpatient status to the service of Dr Yenifer Vieira   Follow-up Information    None         Current Discharge Medication List      CONTINUE these medications which have NOT CHANGED    Details   lisinopril (ZESTRIL) 10 mg tablet            No discharge procedures on file      PDMP Review     None          ED Provider  Electronically Signed by           Megha Wahl PA-C  01/02/21 2989

## 2021-01-02 NOTE — ASSESSMENT & PLAN NOTE
Patient had elevated blood pressure on presentation  Now received several doses of Cardizem following which it has improved    Hold lisinopril for now and continue Cardizem and metoprolol

## 2021-01-02 NOTE — ASSESSMENT & PLAN NOTE
New onset a flutter  Unclear when it started as patient is asymptomatic and it was discovered incidentally on urgent care visit  Heart rate 137 a flutter on admission  Receive several doses of IV Cardizem and now currently on a Cardizem drip  Will continue to titrate to keep heart rate less than 120    Also initiated on metoprolol 25 mg q 6  Cardiology consulted  Will place on Eliquis 5 mg twice daily for anticoagulation  2D echo to be ordered for Monday  Patient admitted to level 2 step-down as Cardizem drip will need to be titrated

## 2021-01-02 NOTE — ED ATTENDING ATTESTATION
1/2/2021  IAnton MD, saw and evaluated the patient  I have discussed the patient with the resident/non-physician practitioner and agree with the resident's/non-physician practitioner's findings, Plan of Care, and MDM as documented in the resident's/non-physician practitioner's note, except where noted  All available labs and Radiology studies were reviewed  I was present for key portions of any procedure(s) performed by the resident/non-physician practitioner and I was immediately available to provide assistance  At this point I agree with the current assessment done in the Emergency Department  I have conducted an independent evaluation of this patient a history and physical is as follows:    ED Course     Patient was seen in urgent care center for UTI  Found to be in atrial flutter  No chest pain  No shortness of breath  No nausea vomiting diarrhea  No history of same  On exam patient awake alert  Nontoxic appearing  Lungs are clear to auscultation  Heart is tachycardic but irregular  Abdomen soft nontender good bowel sounds  Lower extremity shows good dorsalis pedis pulse  No lower extremity edema      Critical Care Time  Procedures

## 2021-01-03 LAB
ALBUMIN SERPL BCP-MCNC: 3.6 G/DL (ref 3.5–5)
ALP SERPL-CCNC: 56 U/L (ref 46–116)
ALT SERPL W P-5'-P-CCNC: 39 U/L (ref 12–78)
ANION GAP SERPL CALCULATED.3IONS-SCNC: 8 MMOL/L (ref 4–13)
AST SERPL W P-5'-P-CCNC: 16 U/L (ref 5–45)
BACTERIA UR CULT: NORMAL
BILIRUB SERPL-MCNC: 0.48 MG/DL (ref 0.2–1)
BUN SERPL-MCNC: 17 MG/DL (ref 5–25)
CALCIUM SERPL-MCNC: 8.9 MG/DL (ref 8.3–10.1)
CHLORIDE SERPL-SCNC: 101 MMOL/L (ref 100–108)
CO2 SERPL-SCNC: 25 MMOL/L (ref 21–32)
CREAT SERPL-MCNC: 0.97 MG/DL (ref 0.6–1.3)
ERYTHROCYTE [DISTWIDTH] IN BLOOD BY AUTOMATED COUNT: 12.5 % (ref 11.6–15.1)
GFR SERPL CREATININE-BSD FRML MDRD: 81 ML/MIN/1.73SQ M
GLUCOSE SERPL-MCNC: 97 MG/DL (ref 65–140)
HCT VFR BLD AUTO: 41.6 % (ref 36.5–49.3)
HGB BLD-MCNC: 14 G/DL (ref 12–17)
MAGNESIUM SERPL-MCNC: 2.4 MG/DL (ref 1.6–2.6)
MCH RBC QN AUTO: 29.7 PG (ref 26.8–34.3)
MCHC RBC AUTO-ENTMCNC: 33.7 G/DL (ref 31.4–37.4)
MCV RBC AUTO: 88 FL (ref 82–98)
PLATELET # BLD AUTO: 213 THOUSANDS/UL (ref 149–390)
PMV BLD AUTO: 10.5 FL (ref 8.9–12.7)
POTASSIUM SERPL-SCNC: 4.3 MMOL/L (ref 3.5–5.3)
PROT SERPL-MCNC: 7.9 G/DL (ref 6.4–8.2)
RBC # BLD AUTO: 4.72 MILLION/UL (ref 3.88–5.62)
SODIUM SERPL-SCNC: 134 MMOL/L (ref 136–145)
TSH SERPL DL<=0.05 MIU/L-ACNC: 1.28 UIU/ML (ref 0.36–3.74)
WBC # BLD AUTO: 9.23 THOUSAND/UL (ref 4.31–10.16)

## 2021-01-03 PROCEDURE — 84443 ASSAY THYROID STIM HORMONE: CPT | Performed by: FAMILY MEDICINE

## 2021-01-03 PROCEDURE — 93005 ELECTROCARDIOGRAM TRACING: CPT

## 2021-01-03 PROCEDURE — 80053 COMPREHEN METABOLIC PANEL: CPT | Performed by: FAMILY MEDICINE

## 2021-01-03 PROCEDURE — 85027 COMPLETE CBC AUTOMATED: CPT | Performed by: FAMILY MEDICINE

## 2021-01-03 PROCEDURE — 99233 SBSQ HOSP IP/OBS HIGH 50: CPT | Performed by: FAMILY MEDICINE

## 2021-01-03 PROCEDURE — 83735 ASSAY OF MAGNESIUM: CPT | Performed by: FAMILY MEDICINE

## 2021-01-03 RX ORDER — METOPROLOL TARTRATE 50 MG/1
50 TABLET, FILM COATED ORAL 4 TIMES DAILY
Status: DISCONTINUED | OUTPATIENT
Start: 2021-01-03 | End: 2021-01-04

## 2021-01-03 RX ORDER — DIPHENHYDRAMINE HCL 25 MG
25 TABLET ORAL ONCE
Status: COMPLETED | OUTPATIENT
Start: 2021-01-03 | End: 2021-01-03

## 2021-01-03 RX ORDER — DIGOXIN 0.25 MG/ML
250 INJECTION INTRAMUSCULAR; INTRAVENOUS DAILY
Status: DISCONTINUED | OUTPATIENT
Start: 2021-01-03 | End: 2021-01-04

## 2021-01-03 RX ADMIN — PHENAZOPYRIDINE 100 MG: 100 TABLET ORAL at 12:12

## 2021-01-03 RX ADMIN — CEFTRIAXONE 1000 MG: 1 INJECTION, SOLUTION INTRAVENOUS at 12:24

## 2021-01-03 RX ADMIN — DILTIAZEM HYDROCHLORIDE 30 MG: 30 TABLET, FILM COATED ORAL at 18:19

## 2021-01-03 RX ADMIN — APIXABAN 5 MG: 5 TABLET, FILM COATED ORAL at 18:19

## 2021-01-03 RX ADMIN — PHENAZOPYRIDINE 100 MG: 100 TABLET ORAL at 17:08

## 2021-01-03 RX ADMIN — DIGOXIN 250 MCG: 0.25 INJECTION INTRAMUSCULAR; INTRAVENOUS at 12:15

## 2021-01-03 RX ADMIN — METOPROLOL TARTRATE 50 MG: 50 TABLET, FILM COATED ORAL at 09:11

## 2021-01-03 RX ADMIN — PHENAZOPYRIDINE 100 MG: 100 TABLET ORAL at 09:11

## 2021-01-03 RX ADMIN — METOPROLOL TARTRATE 50 MG: 50 TABLET, FILM COATED ORAL at 21:07

## 2021-01-03 RX ADMIN — DILTIAZEM HYDROCHLORIDE 30 MG: 30 TABLET, FILM COATED ORAL at 12:14

## 2021-01-03 RX ADMIN — APIXABAN 5 MG: 5 TABLET, FILM COATED ORAL at 09:11

## 2021-01-03 RX ADMIN — DIPHENHYDRAMINE HCL 25 MG: 25 TABLET ORAL at 23:14

## 2021-01-03 RX ADMIN — DILTIAZEM HYDROCHLORIDE 30 MG: 30 TABLET, FILM COATED ORAL at 23:14

## 2021-01-03 RX ADMIN — METOPROLOL TARTRATE 50 MG: 50 TABLET, FILM COATED ORAL at 18:19

## 2021-01-03 NOTE — PLAN OF CARE
Problem: PAIN - ADULT  Goal: Verbalizes/displays adequate comfort level or baseline comfort level  Description: Interventions:  - Encourage patient to monitor pain and request assistance  - Assess pain using appropriate pain scale  - Administer analgesics based on type and severity of pain and evaluate response  - Implement non-pharmacological measures as appropriate and evaluate response  - Consider cultural and social influences on pain and pain management  - Notify physician/advanced practitioner if interventions unsuccessful or patient reports new pain  Outcome: Progressing     Problem: INFECTION - ADULT  Goal: Absence or prevention of progression during hospitalization  Description: INTERVENTIONS:  - Assess and monitor for signs and symptoms of infection  - Monitor lab/diagnostic results  - Monitor all insertion sites, i e  indwelling lines, tubes, and drains  - Monitor endotracheal if appropriate and nasal secretions for changes in amount and color  - [place] appropriate cooling/warming therapies per order  - Administer medications as ordered  - Instruct and encourage patient and family to use good hand hygiene technique  - Identify and instruct in appropriate isolation precautions for identified infection/condition  Outcome: Progressing  Goal: Absence of fever/infection during neutropenic period  Description: INTERVENTIONS:  - Monitor WBC    Outcome: Progressing     Problem: SAFETY ADULT  Goal: Patient will remain free of falls  Description: INTERVENTIONS:  - Assess patient frequently for physical needs  -  Identify cognitive and physical deficits and behaviors that affect risk of falls    -  [place] fall precautions as indicated by assessment   - Educate patient/family on patient safety including physical limitations  - Instruct patient to call for assistance with activity based on assessment  - Modify environment to reduce risk of injury  - Consider OT/PT consult to assist with strengthening/mobility  Outcome: Progressing  Goal: Maintain or return to baseline ADL function  Description: INTERVENTIONS:  -  Assess patient's ability to carry out ADLs; assess patient's baseline for ADL function and identify physical deficits which impact ability to perform ADLs (bathing, care of mouth/teeth, toileting, grooming, dressing, etc )  - Assess/evaluate cause of self-care deficits   - Assess range of motion  - Assess patient's mobility; develop plan if impaired  - Assess patient's need for assistive devices and provide as appropriate  - Encourage maximum independence but intervene and supervise when necessary  - Involve family in performance of ADLs  - Assess for home care needs following discharge   - Consider OT consult to assist with ADL evaluation and planning for discharge  - Provide patient education as appropriate  Outcome: Progressing  Goal: Maintain or return mobility status to optimal level  Description: INTERVENTIONS:  - Assess patient's baseline mobility status (ambulation, transfers, stairs, etc )    - Identify cognitive and physical deficits and behaviors that affect mobility  - Identify mobility aids required to assist with transfers and/or ambulation (gait belt, sit-to-stand, lift, walker, cane, etc )  - [place] fall precautions as indicated by assessment  - Record patient progress and toleration of activity level on Mobility SBAR; progress patient to next Phase/Stage  - Instruct patient to call for assistance with activity based on assessment  - Consider rehabilitation consult to assist with strengthening/weightbearing, etc   Outcome: Progressing     Problem: DISCHARGE PLANNING  Goal: Discharge to home or other facility with appropriate resources  Description: INTERVENTIONS:  - Identify barriers to discharge w/patient and caregiver  - Arrange for needed discharge resources and transportation as appropriate  - Identify discharge learning needs (meds, wound care, etc )  - Arrange for interpretive services to assist at discharge as needed  - Refer to Case Management Department for coordinating discharge planning if the patient needs post-hospital services based on physician/advanced practitioner order or complex needs related to functional status, cognitive ability, or social support system  Outcome: Progressing     Problem: Knowledge Deficit  Goal: Patient/family/caregiver demonstrates understanding of disease process, treatment plan, medications, and discharge instructions  Description: Complete learning assessment and assess knowledge base  Interventions:  - Provide teaching at level of understanding  - Provide teaching via preferred learning methods  Outcome: Progressing     Problem: CARDIOVASCULAR - ADULT  Goal: Maintains optimal cardiac output and hemodynamic stability  Description: INTERVENTIONS:  - Monitor I/O, vital signs and rhythm  - Monitor for S/S and trends of decreased cardiac output  - Administer and titrate ordered vasoactive medications to optimize hemodynamic stability  - Assess quality of pulses, skin color and temperature  - Assess for signs of decreased coronary artery perfusion  - Instruct patient to report change in severity of symptoms  Outcome: Progressing  Goal: Absence of cardiac dysrhythmias or at baseline rhythm  Description: INTERVENTIONS:  - Continuous cardiac monitoring, vital signs, obtain 12 lead EKG if ordered  - Administer antiarrhythmic and heart rate control medications as ordered  - Monitor electrolytes and administer replacement therapy as ordered  Outcome: Progressing     Problem: Potential for Falls  Goal: Patient will remain free of falls  Description: INTERVENTIONS:  - Assess patient frequently for physical needs  -  Identify cognitive and physical deficits and behaviors that affect risk of falls    -  [place] fall precautions as indicated by assessment   - Educate patient/family on patient safety including physical limitations  - Instruct patient to call for assistance with activity based on assessment  - Modify environment to reduce risk of injury  - Consider OT/PT consult to assist with strengthening/mobility  Outcome: Progressing

## 2021-01-03 NOTE — ASSESSMENT & PLAN NOTE
Patient presented with acute cystitis without hematuria for the last 3-4 days  Was taking over-the-counter supplements to help with the dysuria  UA ordered with reflex to culture  Placed on IV ceftriaxone for now  Even though urine culture is negative will still complete 5 day course of antibiotics    Transition to oral antibiotic tomorrow

## 2021-01-03 NOTE — UTILIZATION REVIEW
Initial Clinical Review    Admission: Date/Time/Statement:   Admission Orders (From admission, onward)     Ordered        01/02/21 1258  Inpatient Admission  Once                   Orders Placed This Encounter   Procedures    Inpatient Admission     Standing Status:   Standing     Number of Occurrences:   1     Order Specific Question:   Admitting Physician     Answer:   Narda Morales [[id number]]     Order Specific Question:   Level of Care     Answer:   Level 2 Stepdown / HOT [14]     Order Specific Question:   Estimated length of stay     Answer:   More than 2 Midnights     Order Specific Question:   Certification     Answer:   I certify that inpatient services are medically necessary for this patient for a duration of greater than two midnights  See H&P and MD Progress Notes for additional information about the patient's course of treatment  ED Arrival Information     Expected Arrival Acuity Means of Arrival Escorted By Service Admission Type    - 1/2/2021 10:33 Emergent Walk-In Self Hospitalist Emergency    Arrival Complaint    Abnormal Lab        Chief Complaint   Patient presents with    Abnormal ECG     Pt presented to the [facility] with a possible uti, pt had ecg performed due to a HR of 133, found pt to be in aflutter, sent to the ED for eval     Assessment/Plan: 75 yo male to ED from home w/ dysuria for the last 2-3 days   Went to urgent care and found to be in new onset a flutter w/ RVR   Pt w/ inc fatigue and under inc stress d/t work and the holidays   Admitted IP status w/ aflutter w/ RVR given several doses of iv cardizem and placed on Cardizem gtt , cardiology consulted , echo, place on eliquis and monitor  HTN urgency cont cardizem and hold lisinopril   Acute cystitis w/o hematuria ua cx pending , place on iv ceftriaxone  1/3 IM Note   Atrial flutter w/ RVR , cont cardizem , cardiology consulted, eleiquis , echo   NPO after MN in case cardizem plan for cardioversion    HTN hold lisinopril and cont cardizem and lopressor  Acute cystitis w/o hematuria - iv ceftriaxone        ED Triage Vitals   Temperature Pulse Respirations Blood Pressure SpO2   01/02/21 1922 01/02/21 1044 01/02/21 1044 01/02/21 1044 01/02/21 1044   98 °F (36 7 °C) (!) 133 20 (!) 179/102 100 %      Temp Source Heart Rate Source Patient Position - Orthostatic VS BP Location FiO2 (%)   01/02/21 1922 01/02/21 1044 01/02/21 1044 01/02/21 1044 --   Oral Monitor Lying Left arm       Pain Score       01/02/21 1044       No Pain          Wt Readings from Last 1 Encounters:   01/02/21 111 kg (244 lb)     Additional Vital Signs:   01/03/21 1000    128Abnormal   22  116/81  94  100 %       01/03/21 0900    130Abnormal   39Abnormal   133/96  109  99 %       01/03/21 0800    124Abnormal   15  105/72  83  99 %       01/03/21 0731  97 3 °F (36 3 °C)Abnormal   127Abnormal   18  120/80  93  99 %  None (Room air)  Lying   01/03/21 0530    127Abnormal                01/03/21 0500    127Abnormal   13  113/76  88  98 %  None (Room air)  Lying   01/03/21 0326  98 8 °F (37 1 °C)  127Abnormal   13  114/78  88  97 %  None (Room air)     01/03/21 0204    93               01/03/21 0055    64               01/03/21 0000    65  21  103/65  79  97 %  None (Room air)  Lying   01/02/21 2340    126Abnormal                01/02/21 2300  98 8 °F (37 1 °C)  65  18  104/60  77  96 %  None (Room air)  Lying   01/02/21 2200    126Abnormal   27Abnormal   107/68  84  96 %  None (Room air)  Lying   01/02/21 2143    65               01/02/21 2104    67  16  117/71  89  96 %  None (Room air)  Lying   01/02/21 1948    69               01/02/21 1922  98 °F (36 7 °C)  129Abnormal   18  114/76  87  98 %  None (Room air)  Lying   01/02/21 1600    132Abnormal   15  140/91  100  100 %       01/02/21 1533    128Abnormal   16  132/87    100 %    Sitting   01/02/21 1500    127Abnormal     136/98  113  99 %     01/02/21 1445    127Abnormal     134/98  113  99 %       01/02/21 1415    126Abnormal     143/100  116  100 %       01/02/21 1402    126Abnormal   16  144/100    100 %  None (Room air)  Lying   01/02/21 1345    125Abnormal     145/99  116  98 %       01/02/21 1330    126Abnormal     145/97  112  98 %       01/02/21 1315    127Abnormal     145/90  111  86 %Abnormal        01/02/21 1300    126Abnormal     125/88  102  98 %       01/02/21 1245    127Abnormal     122/84  98  99 %       01/02/21 1241    127Abnormal   20  125/87  100  100 %  None (Room air)  Lying   01/02/21 1215    130Abnormal     138/87  106  97 %       01/02/21 1200    132Abnormal     145/91  107  100 %       01/02/21 1145    133Abnormal     142/81  106  100 %       01/02/21 1144    133Abnormal   20  130/74    100 %  None (Room air)  Lying   01/02/21 1130    134Abnormal     153/73  106  100 %       01/02/21 1115    134Abnormal     161/77  108  100 %       01/02/21 1100    144Abnormal     167/80  115  99 %       01/02/21 1046              None (Room          Pertinent Labs/Diagnostic Test Results:   1/2 PCXR No acute cardiopulmonary disease  1/2 EKG    Results from last 7 days   Lab Units 01/03/21  0515 01/02/21  1102   WBC Thousand/uL 9 23 10 79*   HEMOGLOBIN g/dL 14 0 13 5   HEMATOCRIT % 41 6 39 2   PLATELETS Thousands/uL 213 197   NEUTROS ABS Thousands/µL  --  7 90*     Results from last 7 days   Lab Units 01/03/21  0515 01/02/21  1102   SODIUM mmol/L 134* 137   POTASSIUM mmol/L 4 3 4 2   CHLORIDE mmol/L 101 102   CO2 mmol/L 25 25   ANION GAP mmol/L 8 10   BUN mg/dL 17 18   CREATININE mg/dL 0 97 1 00   EGFR ml/min/1 73sq m 81 78   CALCIUM mg/dL 8 9 8 3   MAGNESIUM mg/dL 2 4 2 1     Results from last 7 days   Lab Units 01/03/21  0515 01/02/21  1102   AST U/L 16 24   ALT U/L 39 42   ALK PHOS U/L 56 54   TOTAL PROTEIN g/dL 7 9 7 2   ALBUMIN g/dL 3 6 3 4*   TOTAL BILIRUBIN mg/dL 0 48 0 24     Results from last 7 days   Lab Units 01/03/21  0515 01/02/21  1102   GLUCOSE RANDOM mg/dL 97 128     Results from last 7 days   Lab Units 01/02/21  1102   CK TOTAL U/L 72     Results from last 7 days   Lab Units 01/02/21  1102   TROPONIN I ng/mL <0 02     Results from last 7 days   Lab Units 01/02/21  1144   PROTIME seconds 13 9   INR  1 09   PTT seconds 32     Results from last 7 days   Lab Units 01/03/21  0515   TSH 3RD GENERATON uIU/mL 1 282     Results from last 7 days   Lab Units 01/02/21  1836   LACTIC ACID mmol/L 1 6     Results from last 7 days   Lab Units 01/02/21  1107   CLARITY UA  Cloudy   COLOR UA  Yellow   SPEC GRAV UA  1 025   PH UA  5 5   GLUCOSE UA mg/dl Negative   KETONES UA mg/dl Negative   BLOOD UA  Large*   PROTEIN UA mg/dl 100 (2+)*   NITRITE UA  Positive*   BILIRUBIN UA  Negative   UROBILINOGEN UA E U /dl 0 2   LEUKOCYTES UA  Small*   WBC UA /hpf 30-50*   RBC UA /hpf Innumerable*   BACTERIA UA /hpf Innumerable*   EPITHELIAL CELLS WET PREP /hpf Occasional   MUCUS THREADS  Occasional*     Results from last 7 days   Lab Units 01/02/21  1107   URINE CULTURE  No Growth <1000 cfu/mL     ED Treatment:   Medication Administration from 01/02/2021 1031 to 01/02/2021 1547       Date/Time Order Dose Route Action     01/02/2021 1138 diltiazem (CARDIZEM) injection 15 mg 15 mg Intravenous Given     01/02/2021 1142 sodium chloride 0 9 % bolus 1,000 mL 1,000 mL Intravenous New Bag     01/02/2021 1152 cefTRIAXone (ROCEPHIN) IVPB (premix in dextrose) 1,000 mg 50 mL 1,000 mg Intravenous New Bag     01/02/2021 1222 diltiazem (CARDIZEM) injection 15 mg 15 mg Intravenous Given     01/02/2021 1219 metoprolol (LOPRESSOR) injection 5 mg 5 mg Intravenous Given     01/02/2021 1257 metoprolol (LOPRESSOR) injection 5 mg 5 mg Intravenous Given     01/02/2021 1533 diltiazem (CARDIZEM) 125 mg in sodium chloride 0 9 % 125 mL infusion 15 mg/hr Intravenous Rate/Dose Change     01/02/2021 1452 diltiazem (CARDIZEM) 125 mg in sodium chloride 0 9 % 125 mL infusion 12 5 mg/hr Intravenous Rate/Dose Change     01/02/2021 1414 diltiazem (CARDIZEM) 125 mg in sodium chloride 0 9 % 125 mL infusion   Intravenous Rate/Dose Change     01/02/2021 1400 diltiazem (CARDIZEM) 125 mg in sodium chloride 0 9 % 125 mL infusion 7 5 mg/hr Intravenous Rate/Dose Change     01/02/2021 1305 diltiazem (CARDIZEM) 125 mg in sodium chloride 0 9 % 125 mL infusion 5 mg/hr Intravenous New Bag     01/02/2021 1416 metoprolol (LOPRESSOR) injection 5 mg 5 mg Intravenous Given        Past Medical History:   Diagnosis Date    Hypertension      Present on Admission:  **None**      Admitting Diagnosis: Atrial flutter (HCC) [I48 92]  Chest tightness [R07 89]  UTI (urinary tract infection) [N39 0]  Atrial flutter with rapid ventricular response (HCC) [I48 92]  Age/Sex: 76 y o  male  Admission Orders:  Scheduled Medications:  apixaban, 5 mg, Oral, BID  cefTRIAXone, 1,000 mg, Intravenous, Q24H  metoprolol tartrate, 50 mg, Oral, TID  phenazopyridine, 100 mg, Oral, TID With Meals    Continuous IV Infusions:  diltiazem, 1-15 mg/hr, Intravenous, Titrated      PRN Meds:  acetaminophen, 650 mg, Oral, Q6H PRN  ondansetron, 4 mg, Intravenous, Q6H PRN    Up and OOB   Tele   NPO after MN    IP CONSULT TO CARDIOLOGY  IP CONSULT TO CASE MANAGEMENT    Network Utilization Review Department  ATTENTION: Please call with any questions or concerns to [phone] and carefully listen to the prompts so that you are directed to the right person  All voicemails are confidential   Amaris Mckinney all requests for admission clinical reviews, approved or denied determinations and any other requests to dedicated fax number below belonging to the campus where the patient is receiving treatment   List of dedicated fax numbers for the Facilities:  [address] DENIALS (Administrative/Medical Necessity) [phone]   [address] (Maternity/NICU/Pediatrics) [phone]   Tamiko Moreno [address] [facility] [address] [place] [address]) [address] [place] [address] [address] [phone]   [place] [id number] [phone]

## 2021-01-03 NOTE — ASSESSMENT & PLAN NOTE
New onset a flutter  Unclear when it started as patient is asymptomatic and it was discovered incidentally on urgent care visit  Heart rate 137 a flutter on admission  Receive several doses of IV Cardizem and now currently on a Cardizem drip  Will continue to titrate to keep heart rate less than 120  Also initiated on metoprolol 50 mg q 8  Cardiology consulted  Will place on Eliquis 5 mg twice daily for anticoagulation  2D echo to be ordered for Monday  Patient admitted to level 2 step-down as Cardizem drip will need to be titrated     1/3:  Overnight patient's heart rates went down to the 80s and 90s however now back up to 120s  Back on Cardizem drip  Increase metoprolol to 50 mg q 6  Discontinue Cardizem drip and placed on oral Cardizem 30 q  6  Also give 1 dose of digoxin 250 mcg IV  2D echo scheduled for tomorrow  Keep NPO after midnight in case Cardiology plans cardioversion  Continue Eliquis  Will accept a heart rate of 120s today  Is difficult to maintain rate control for him and hence he may be a candidate for cardioversion    No evidence of any heart failure symptoms at this time

## 2021-01-03 NOTE — PLAN OF CARE
Problem: PAIN - ADULT  Goal: Verbalizes/displays adequate comfort level or baseline comfort level  Description: Interventions:  - Encourage patient to monitor pain and request assistance  - Assess pain using appropriate pain scale  - Administer analgesics based on type and severity of pain and evaluate response  - Implement non-pharmacological measures as appropriate and evaluate response  - Consider cultural and social influences on pain and pain management  - Notify physician/advanced practitioner if interventions unsuccessful or patient reports new pain  Outcome: Progressing     Problem: INFECTION - ADULT  Goal: Absence or prevention of progression during hospitalization  Description: INTERVENTIONS:  - Assess and monitor for signs and symptoms of infection  - Monitor lab/diagnostic results  - Monitor all insertion sites, i e  indwelling lines, tubes, and drains  - Monitor endotracheal if appropriate and nasal secretions for changes in amount and color  - [place] appropriate cooling/warming therapies per order  - Administer medications as ordered  - Instruct and encourage patient and family to use good hand hygiene technique  - Identify and instruct in appropriate isolation precautions for identified infection/condition  Outcome: Progressing  Goal: Absence of fever/infection during neutropenic period  Description: INTERVENTIONS:  - Monitor WBC    Outcome: Progressing     Problem: SAFETY ADULT  Goal: Patient will remain free of falls  Description: INTERVENTIONS:  - Assess patient frequently for physical needs  -  Identify cognitive and physical deficits and behaviors that affect risk of falls    -  [place] fall precautions as indicated by assessment   - Educate patient/family on patient safety including physical limitations  - Instruct patient to call for assistance with activity based on assessment  - Modify environment to reduce risk of injury  - Consider OT/PT consult to assist with strengthening/mobility  Outcome: Progressing  Goal: Maintain or return to baseline ADL function  Description: INTERVENTIONS:  -  Assess patient's ability to carry out ADLs; assess patient's baseline for ADL function and identify physical deficits which impact ability to perform ADLs (bathing, care of mouth/teeth, toileting, grooming, dressing, etc )  - Assess/evaluate cause of self-care deficits   - Assess range of motion  - Assess patient's mobility; develop plan if impaired  - Assess patient's need for assistive devices and provide as appropriate  - Encourage maximum independence but intervene and supervise when necessary  - Involve family in performance of ADLs  - Assess for home care needs following discharge   - Consider OT consult to assist with ADL evaluation and planning for discharge  - Provide patient education as appropriate  Outcome: Progressing  Goal: Maintain or return mobility status to optimal level  Description: INTERVENTIONS:  - Assess patient's baseline mobility status (ambulation, transfers, stairs, etc )    - Identify cognitive and physical deficits and behaviors that affect mobility  - Identify mobility aids required to assist with transfers and/or ambulation (gait belt, sit-to-stand, lift, walker, cane, etc )  - [place] fall precautions as indicated by assessment  - Record patient progress and toleration of activity level on Mobility SBAR; progress patient to next Phase/Stage  - Instruct patient to call for assistance with activity based on assessment  - Consider rehabilitation consult to assist with strengthening/weightbearing, etc   Outcome: Progressing     Problem: DISCHARGE PLANNING  Goal: Discharge to home or other facility with appropriate resources  Description: INTERVENTIONS:  - Identify barriers to discharge w/patient and caregiver  - Arrange for needed discharge resources and transportation as appropriate  - Identify discharge learning needs (meds, wound care, etc )  - Arrange for interpretive services to assist at discharge as needed  - Refer to Case Management Department for coordinating discharge planning if the patient needs post-hospital services based on physician/advanced practitioner order or complex needs related to functional status, cognitive ability, or social support system  Outcome: Progressing     Problem: Knowledge Deficit  Goal: Patient/family/caregiver demonstrates understanding of disease process, treatment plan, medications, and discharge instructions  Description: Complete learning assessment and assess knowledge base  Interventions:  - Provide teaching at level of understanding  - Provide teaching via preferred learning methods  Outcome: Progressing     Problem: Potential for Falls  Goal: Patient will remain free of falls  Description: INTERVENTIONS:  - Assess patient frequently for physical needs  -  Identify cognitive and physical deficits and behaviors that affect risk of falls    -  [place] fall precautions as indicated by assessment   - Educate patient/family on patient safety including physical limitations  - Instruct patient to call for assistance with activity based on assessment  - Modify environment to reduce risk of injury  - Consider OT/PT consult to assist with strengthening/mobility  Outcome: Progressing     Problem: CARDIOVASCULAR - ADULT  Goal: Maintains optimal cardiac output and hemodynamic stability  Description: INTERVENTIONS:  - Monitor I/O, vital signs and rhythm  - Monitor for S/S and trends of decreased cardiac output  - Administer and titrate ordered vasoactive medications to optimize hemodynamic stability  - Assess quality of pulses, skin color and temperature  - Assess for signs of decreased coronary artery perfusion  - Instruct patient to report change in severity of symptoms  Outcome: Progressing  Goal: Absence of cardiac dysrhythmias or at baseline rhythm  Description: INTERVENTIONS:  - Continuous cardiac monitoring, vital signs, obtain 12 lead EKG if ordered  - Administer antiarrhythmic and heart rate control medications as ordered  - Monitor electrolytes and administer replacement therapy as ordered  Outcome: Not Progressing

## 2021-01-03 NOTE — PROGRESS NOTES
Progress Note - Abram Delgado 1955, 76 y o  male MRN: [id number]    Unit/Bed#: -01 Encounter: [id number]    Primary Care Provider: Chloe Guido MD   Date and time admitted to hospital: 1/2/2021 10:38 AM        * Atrial flutter with rapid ventricular response [facility])  Assessment & Plan  New onset a flutter  Unclear when it started as patient is asymptomatic and it was discovered incidentally on urgent care visit  Heart rate 137 a flutter on admission  Receive several doses of IV Cardizem and now currently on a Cardizem drip  Will continue to titrate to keep heart rate less than 120  Also initiated on metoprolol 50 mg q 8  Cardiology consulted  Will place on Eliquis 5 mg twice daily for anticoagulation  2D echo to be ordered for Monday  Patient admitted to level 2 step-down as Cardizem drip will need to be titrated     1/3:  Overnight patient's heart rates went down to the 80s and 90s however now back up to 120s  Back on Cardizem drip  Increase metoprolol to 50 mg q 6  Discontinue Cardizem drip and placed on oral Cardizem 30 q  6  Also give 1 dose of digoxin 250 mcg IV  2D echo scheduled for tomorrow  Keep NPO after midnight in case Cardiology plans cardioversion  Continue Eliquis  Will accept a heart rate of 120s today  Is difficult to maintain rate control for him and hence he may be a candidate for cardioversion    No evidence of any heart failure symptoms at this time  Hypertensive urgency  Assessment & Plan  Patient had elevated blood pressure on presentation  Now received several doses of Cardizem following which it has improved  Hold lisinopril for now and continue Cardizem and metoprolol    Acute cystitis without hematuria  Assessment & Plan  Patient presented with acute cystitis without hematuria for the last 3-4 days  Was taking over-the-counter supplements to help with the dysuria  UA ordered with reflex to culture  Placed on IV ceftriaxone for now    Even though urine culture is negative will still complete 5 day course of antibiotics  Transition to oral antibiotic tomorrow      VTE Pharmacologic Prophylaxis:   Pharmacologic: Apixaban (Eliquis)  Mechanical VTE Prophylaxis in Place: Yes    Patient Centered Rounds: I have performed bedside rounds with nursing staff today  Discussions with Specialists or Other Care Team Provider:  Discussed with Cardiology    Education and Discussions with Family / Patient:  Discussed with patient at bedside    Time Spent for Care:  45 minutes  More than 50% of total time spent on counseling and coordination of care as described above  Current Length of Stay: 1 day(s)    Current Patient Status: Inpatient   Certification Statement: The patient will continue to require additional inpatient hospital stay due to A flutter with RVR    Discharge Plan:  Pending progress  NPO after midnight for cardiology evaluation  May require cardioversion    Code Status: Level 1 - Full Code      Subjective:   Patient denies any chest pain or shortness of breath or palpitations  He is completely asymptomatic  Heart rate again in the 120s despite being on a Cardizem drip  Overnight did slow down into the 80s and 90s for few hours  Objective:     Vitals:   Temp (24hrs), Av 2 °F (36 8 °C), Min:97 3 °F (36 3 °C), Max:98 8 °F (37 1 °C)    Temp:  [97 3 °F (36 3 °C)-98 8 °F (37 1 °C)] 97 3 °F (36 3 °C)  HR:  [] 128  Resp:  [13-39] 22  BP: (103-167)/() 116/81  SpO2:  [86 %-100 %] 100 %  Body mass index is 33 09 kg/m²  Input and Output Summary (last 24 hours): Intake/Output Summary (Last 24 hours) at 1/3/2021 1044  Last data filed at 1/3/2021 1001  Gross per 24 hour   Intake 2801 33 ml   Output 3325 ml   Net -523 67 ml       Physical Exam:     Physical Exam  Vitals signs and nursing note reviewed  Constitutional:       Appearance: Normal appearance  HENT:      Head: Normocephalic and atraumatic        Right Ear: External ear normal       Left Ear: External ear normal       Nose: Nose normal       Mouth/Throat:      Pharynx: Oropharynx is clear  Neck:      Musculoskeletal: Normal range of motion and neck supple  Cardiovascular:      Rate and Rhythm: Regular rhythm  Tachycardia present  Heart sounds: Normal heart sounds  Pulmonary:      Effort: Pulmonary effort is normal       Breath sounds: Normal breath sounds  Abdominal:      General: Bowel sounds are normal       Palpations: Abdomen is soft  Tenderness: There is no abdominal tenderness  Musculoskeletal: Normal range of motion  Skin:     General: Skin is warm and dry  Capillary Refill: Capillary refill takes less than 2 seconds  Neurological:      General: No focal deficit present  Mental Status: He is alert and oriented to person, place, and time  Psychiatric:         Mood and Affect: Mood normal            Additional Data:     Labs:    Results from last 7 days   Lab Units 01/03/21  0515 01/02/21  1102   WBC Thousand/uL 9 23 10 79*   HEMOGLOBIN g/dL 14 0 13 5   HEMATOCRIT % 41 6 39 2   PLATELETS Thousands/uL 213 197   NEUTROS PCT %  --  72   LYMPHS PCT %  --  16   MONOS PCT %  --  9   EOS PCT %  --  2     Results from last 7 days   Lab Units 01/03/21  0515   SODIUM mmol/L 134*   POTASSIUM mmol/L 4 3   CHLORIDE mmol/L 101   CO2 mmol/L 25   BUN mg/dL 17   CREATININE mg/dL 0 97   ANION GAP mmol/L 8   CALCIUM mg/dL 8 9   ALBUMIN g/dL 3 6   TOTAL BILIRUBIN mg/dL 0 48   ALK PHOS U/L 56   ALT U/L 39   AST U/L 16   GLUCOSE RANDOM mg/dL 97     Results from last 7 days   Lab Units 01/02/21  1144   INR  1 09             Results from last 7 days   Lab Units 01/02/21  1836   LACTIC ACID mmol/L 1 6           * I Have Reviewed All Lab Data Listed Above  * Additional Pertinent Lab Tests Reviewed:  All Labs For Current Hospital Admission Reviewed    Imaging:    Imaging Reports Reviewed Today Include:  None  Imaging Personally Reviewed by Myself Includes: None    Recent Cultures (last 7 days):     Results from last 7 days   Lab Units 01/02/21  1107   URINE CULTURE  No Growth <1000 cfu/mL       Last 24 Hours Medication List:   Current Facility-Administered Medications   Medication Dose Route Frequency Provider Last Rate    acetaminophen  650 mg Oral Q6H PRN Tayla Tyler MD      apixaban  5 mg Oral BID Tayla Tyler MD      cefTRIAXone  1,000 mg Intravenous Q24H Tayla Tyler MD      digoxin  250 mcg Intravenous Daily Tayla Tyler MD      diltiazem  30 mg Oral Q6H [facility] Tayla Tyler MD      metoprolol tartrate  50 mg Oral 4x Daily Tayla Tyler MD      ondansetron  4 mg Intravenous Q6H PRN Tayla Tyler MD      phenazopyridine  100 mg Oral TID With Meals Tayla Tyler MD          Today, Patient Was Seen By: Tayla Tyler MD    ** Please Note: Dictation voice to text software may have been used in the creation of this document   **

## 2021-01-04 ENCOUNTER — APPOINTMENT (INPATIENT)
Dept: NON INVASIVE DIAGNOSTICS | Facility: HOSPITAL | Age: 66
DRG: 309 | End: 2021-01-04
Attending: INTERNAL MEDICINE
Payer: COMMERCIAL

## 2021-01-04 ENCOUNTER — ANESTHESIA EVENT (INPATIENT)
Dept: NON INVASIVE DIAGNOSTICS | Facility: HOSPITAL | Age: 66
DRG: 309 | End: 2021-01-04
Payer: COMMERCIAL

## 2021-01-04 PROBLEM — G47.30 SLEEP APNEA: Status: ACTIVE | Noted: 2021-01-04

## 2021-01-04 PROBLEM — D68.9 COAGULATION DISORDER (HCC): Status: ACTIVE | Noted: 2021-01-04

## 2021-01-04 LAB
ANION GAP SERPL CALCULATED.3IONS-SCNC: 7 MMOL/L (ref 4–13)
BUN SERPL-MCNC: 19 MG/DL (ref 5–25)
CALCIUM SERPL-MCNC: 8.9 MG/DL (ref 8.3–10.1)
CHLORIDE SERPL-SCNC: 100 MMOL/L (ref 100–108)
CO2 SERPL-SCNC: 27 MMOL/L (ref 21–32)
CREAT SERPL-MCNC: 1.18 MG/DL (ref 0.6–1.3)
GFR SERPL CREATININE-BSD FRML MDRD: 64 ML/MIN/1.73SQ M
GLUCOSE SERPL-MCNC: 103 MG/DL (ref 65–140)
POTASSIUM SERPL-SCNC: 4.3 MMOL/L (ref 3.5–5.3)
SODIUM SERPL-SCNC: 134 MMOL/L (ref 136–145)

## 2021-01-04 PROCEDURE — 93005 ELECTROCARDIOGRAM TRACING: CPT

## 2021-01-04 PROCEDURE — 93320 DOPPLER ECHO COMPLETE: CPT | Performed by: INTERNAL MEDICINE

## 2021-01-04 PROCEDURE — 99223 1ST HOSP IP/OBS HIGH 75: CPT | Performed by: INTERNAL MEDICINE

## 2021-01-04 PROCEDURE — 93325 DOPPLER ECHO COLOR FLOW MAPG: CPT | Performed by: INTERNAL MEDICINE

## 2021-01-04 PROCEDURE — NC001 PR NO CHARGE: Performed by: INTERNAL MEDICINE

## 2021-01-04 PROCEDURE — 99232 SBSQ HOSP IP/OBS MODERATE 35: CPT | Performed by: FAMILY MEDICINE

## 2021-01-04 PROCEDURE — 92960 CARDIOVERSION ELECTRIC EXT: CPT | Performed by: INTERNAL MEDICINE

## 2021-01-04 PROCEDURE — 5A2204Z RESTORATION OF CARDIAC RHYTHM, SINGLE: ICD-10-PCS | Performed by: INTERNAL MEDICINE

## 2021-01-04 PROCEDURE — 93312 ECHO TRANSESOPHAGEAL: CPT | Performed by: INTERNAL MEDICINE

## 2021-01-04 PROCEDURE — 92960 CARDIOVERSION ELECTRIC EXT: CPT

## 2021-01-04 PROCEDURE — 93312 ECHO TRANSESOPHAGEAL: CPT

## 2021-01-04 PROCEDURE — 80048 BASIC METABOLIC PNL TOTAL CA: CPT | Performed by: NURSE PRACTITIONER

## 2021-01-04 RX ORDER — KETAMINE HCL IN NACL, ISO-OSM 100MG/10ML
SYRINGE (ML) INJECTION AS NEEDED
Status: DISCONTINUED | OUTPATIENT
Start: 2021-01-04 | End: 2021-01-04

## 2021-01-04 RX ORDER — SODIUM CHLORIDE 9 MG/ML
INJECTION, SOLUTION INTRAVENOUS CONTINUOUS PRN
Status: DISCONTINUED | OUTPATIENT
Start: 2021-01-04 | End: 2021-01-04

## 2021-01-04 RX ORDER — KETAMINE HCL IN NACL, ISO-OSM 100MG/10ML
0.2 SYRINGE (ML) INJECTION ONCE
Status: COMPLETED | OUTPATIENT
Start: 2021-01-04 | End: 2021-01-04

## 2021-01-04 RX ORDER — DIPHENHYDRAMINE HCL 25 MG
25 TABLET ORAL
Status: DISCONTINUED | OUTPATIENT
Start: 2021-01-04 | End: 2021-01-05 | Stop reason: HOSPADM

## 2021-01-04 RX ORDER — METOPROLOL TARTRATE 50 MG/1
50 TABLET, FILM COATED ORAL EVERY 12 HOURS SCHEDULED
Status: DISCONTINUED | OUTPATIENT
Start: 2021-01-04 | End: 2021-01-04

## 2021-01-04 RX ORDER — CEPHALEXIN 250 MG/1
500 CAPSULE ORAL EVERY 12 HOURS SCHEDULED
Status: DISCONTINUED | OUTPATIENT
Start: 2021-01-04 | End: 2021-01-05 | Stop reason: HOSPADM

## 2021-01-04 RX ORDER — PROPOFOL 10 MG/ML
INJECTION, EMULSION INTRAVENOUS AS NEEDED
Status: DISCONTINUED | OUTPATIENT
Start: 2021-01-04 | End: 2021-01-04

## 2021-01-04 RX ORDER — EPHEDRINE SULFATE 50 MG/ML
INJECTION INTRAVENOUS AS NEEDED
Status: DISCONTINUED | OUTPATIENT
Start: 2021-01-04 | End: 2021-01-04

## 2021-01-04 RX ADMIN — PHENAZOPYRIDINE 100 MG: 100 TABLET ORAL at 12:51

## 2021-01-04 RX ADMIN — METOPROLOL TARTRATE 25 MG: 25 TABLET, FILM COATED ORAL at 17:46

## 2021-01-04 RX ADMIN — PHENAZOPYRIDINE 100 MG: 100 TABLET ORAL at 16:20

## 2021-01-04 RX ADMIN — PROPOFOL 20 MG: 10 INJECTION, EMULSION INTRAVENOUS at 11:27

## 2021-01-04 RX ADMIN — EPHEDRINE SULFATE 5 MG: 50 INJECTION, SOLUTION INTRAVENOUS at 11:41

## 2021-01-04 RX ADMIN — APIXABAN 5 MG: 5 TABLET, FILM COATED ORAL at 17:46

## 2021-01-04 RX ADMIN — DIPHENHYDRAMINE HCL 25 MG: 25 TABLET ORAL at 23:07

## 2021-01-04 RX ADMIN — METOPROLOL TARTRATE 75 MG: 50 TABLET, FILM COATED ORAL at 21:12

## 2021-01-04 RX ADMIN — PHENAZOPYRIDINE 100 MG: 100 TABLET ORAL at 08:35

## 2021-01-04 RX ADMIN — APIXABAN 5 MG: 5 TABLET, FILM COATED ORAL at 08:34

## 2021-01-04 RX ADMIN — PROPOFOL 20 MG: 10 INJECTION, EMULSION INTRAVENOUS at 11:28

## 2021-01-04 RX ADMIN — CEPHALEXIN 500 MG: 250 CAPSULE ORAL at 16:20

## 2021-01-04 RX ADMIN — PROPOFOL 50 MG: 10 INJECTION, EMULSION INTRAVENOUS at 11:33

## 2021-01-04 RX ADMIN — METOPROLOL TARTRATE 50 MG: 50 TABLET, FILM COATED ORAL at 08:34

## 2021-01-04 RX ADMIN — PROPOFOL 70 MG: 10 INJECTION, EMULSION INTRAVENOUS at 11:22

## 2021-01-04 RX ADMIN — Medication 22 MG: at 11:10

## 2021-01-04 RX ADMIN — SODIUM CHLORIDE 500 ML: 0.9 INJECTION, SOLUTION INTRAVENOUS at 17:47

## 2021-01-04 RX ADMIN — DILTIAZEM HYDROCHLORIDE 30 MG: 30 TABLET, FILM COATED ORAL at 06:21

## 2021-01-04 RX ADMIN — SODIUM CHLORIDE: 0.9 INJECTION, SOLUTION INTRAVENOUS at 11:15

## 2021-01-04 RX ADMIN — Medication 20 MG: at 11:22

## 2021-01-04 RX ADMIN — PROPOFOL 40 MG: 10 INJECTION, EMULSION INTRAVENOUS at 11:30

## 2021-01-04 RX ADMIN — Medication 10 MG: at 11:30

## 2021-01-04 RX ADMIN — PROPOFOL 20 MG: 10 INJECTION, EMULSION INTRAVENOUS at 11:26

## 2021-01-04 RX ADMIN — PROPOFOL 50 MG: 10 INJECTION, EMULSION INTRAVENOUS at 11:24

## 2021-01-04 NOTE — CONSULTS
Consult Cardiology    Remy Calderon 1955, 76 y o  male MRN: [id number]    Unit/Bed#: -01 Encounter: [id number]    Attending Provider: Bon Kirkland MD   Primary Care Provider: Diane Grewal MD   Date admitted to hospital: 1/2/2021       Inpatient consult to Cardiology  Consult performed by: Joseph Haro DO  Consult ordered by: Bon Kirkland MD          * Atrial flutter with rapid ventricular response [facility])  Assessment & Plan  New onset atrial flutter of unclear duration  Chads Vasc 2  Started on Eliquis anticoagulation  Rapid ventricular rates which have not been controlled despite up titration of medication  Will plan BONG guided cardioversion later today  Patient likely has underlying sleep apnea and will need outpatient sleep study  Patient will also need outpatient ischemic workup (although doubt cause)  Would recommend continuing either metoprolol or diltiazem postprocedure which will be determined pending his response to cardioversion  Discussed indications and risks of BONG and cardioversion and patient is agreeable to proceed  Will need at a minimum 4 weeks of on interrupted anticoagulation with long-term anticoagulation to be determined  Low threshold for EP evaluation and atrial flutter ablation  Physician Requesting Consult: Bon Kirkland MD    Reason for Consult / Principal Problem:  New onset atrial flutter  HPI: Remy Calderon is a 76y o  year old male who initially presented to urgent care for dysuria  He had noted 2-3 days of increased frequency of urination as well as dysuria  Upon evaluation at urgent care he was noted to have atrial flutter with rapid ventricular response and was sent to the emergency room 01/02/2021 for further evaluation  Patient denies any chest pain or shortness of breath  He had noted some increased fatigue but had also noted increased stress at work and due to the holidays  No prior history of arrhythmia  Past medical history significant only for documented hypertension  Review of labs suggests hyperlipidemia  Patient was started on Cardizem gtt and has been started on metoprolol tartrate 50 mg every 8 hours  He was started on Eliquis anticoagulation  He was also started on oral cardizem 30 mg po q 6 hours and given 1 dose of digoxin 250 mcg IV  Echocardiogram has been ordered  Patient was kept NPO after midnight for possible cardioversion  Patient states he is feeling well in general   He denies any palpitations  He denies any lightheadedness or dizziness  He has no prior history of cardiac/rhythm issues  He drinks only rare social alcohol  He is very active and is retired   He works part-time at [employer] as a  and walks a lot at work and on his days off walks for exercise without any limitation  He also lifts light weights  He denies any cardiac symptoms prior to admission  He does admit to snoring and his son who is a physician had recommended sleep study previously  We discussed indications for cardioversion as heart rates have not been able to be controlled despite up titration of medications  We discussed the risk and benefit of proceeding with both BONG and cardioversion  Review of Systems   Constitutional: Negative for activity change, appetite change and fatigue  HENT: Negative for congestion, hearing loss, tinnitus and trouble swallowing  Eyes: Negative for visual disturbance  Respiratory: Negative for cough, chest tightness, shortness of breath and wheezing  Cardiovascular: Negative for chest pain, palpitations and leg swelling  Gastrointestinal: Negative for abdominal distention, abdominal pain, nausea and vomiting  Genitourinary: Positive for dysuria (Improving) and frequency  Negative for difficulty urinating  Musculoskeletal: Negative for arthralgias  Skin: Negative for rash     Neurological: Negative for dizziness, syncope and light-headedness  Hematological: Does not bruise/bleed easily  Psychiatric/Behavioral: Negative for confusion  The patient is not nervous/anxious  All other systems reviewed and are negative  Historical Information     Past Medical History:   Diagnosis Date    Hypertension      Past Surgical History:   Procedure Laterality Date    WISDOM TOOTH EXTRACTION       Social History     Substance and Sexual Activity   Alcohol Use Yes    Frequency: Monthly or less    Drinks per session: 1 or 2    Binge frequency: Never     Social History     Substance and Sexual Activity   Drug Use Never     Social History     Tobacco Use   Smoking Status Never Smoker   Smokeless Tobacco Never Used       Family History: non-contributory    Meds/Allergies     current meds:   Current Facility-Administered Medications   Medication Dose Route Frequency    acetaminophen (TYLENOL) tablet 650 mg  650 mg Oral Q6H PRN    apixaban (ELIQUIS) tablet 5 mg  5 mg Oral BID    cefTRIAXone (ROCEPHIN) IVPB (premix in dextrose) 1,000 mg 50 mL  1,000 mg Intravenous Q24H    digoxin (LANOXIN) injection 250 mcg  250 mcg Intravenous Daily    diltiazem (CARDIZEM) tablet 30 mg  30 mg Oral Q6H [address]    metoprolol tartrate (LOPRESSOR) tablet 50 mg  50 mg Oral 4x Daily    ondansetron (ZOFRAN) injection 4 mg  4 mg Intravenous Q6H PRN    phenazopyridine (PYRIDIUM) tablet 100 mg  100 mg Oral TID With Meals          No Known Allergies    Objective     Vitals: Blood pressure 132/89, pulse (!) 129, temperature 97 9 °F (36 6 °C), temperature source Oral, resp  rate 14, height 6' (1 829 m), weight 111 kg (244 lb), SpO2 95 %  , Body mass index is 33 09 kg/m²      Orthostatic Blood Pressures      Most Recent Value   Blood Pressure  132/89 filed at 01/04/2021 0800   Patient Position - Orthostatic VS  Lying filed at 01/04/2021 7079          Systolic (83XEF), MBT:513 , Min:108 , NVT:006     Diastolic (46AOJ), OZD:33, Min:72, Max:96        Intake/Output Summary (Last 24 hours) at 1/4/2021 1052  Last data filed at 1/4/2021 0701  Gross per 24 hour   Intake 403 13 ml   Output 1875 ml   Net -1471 87 ml       Weight (last 2 days)     Date/Time   Weight    01/02/21 1044   111 (244)              Invasive Devices     Peripheral Intravenous Line            Peripheral IV 01/02/21 Right Arm 1 day                Physical Exam  Vitals signs reviewed  Constitutional:       Appearance: He is well-developed  HENT:      Head: Normocephalic and atraumatic  Eyes:      Pupils: Pupils are equal, round, and reactive to light  Neck:      Musculoskeletal: Normal range of motion  Vascular: No JVD  Cardiovascular:      Rate and Rhythm: Tachycardia present  Rhythm regularly irregular  Heart sounds: Normal heart sounds  No murmur  No friction rub  No gallop  Pulmonary:      Effort: Pulmonary effort is normal       Breath sounds: Normal breath sounds  No rales  Abdominal:      Palpations: Abdomen is soft  Skin:     General: Skin is warm and dry  Neurological:      Mental Status: He is alert and oriented to person, place, and time     Psychiatric:         Behavior: Behavior normal               Laboratory Results:    Results from last 7 days   Lab Units 01/02/21  1102   CK TOTAL U/L 72   TROPONIN I ng/mL <0 02       CBC with diff:   Results from last 7 days   Lab Units 01/03/21  0515 01/02/21  1102   WBC Thousand/uL 9 23 10 79*   HEMOGLOBIN g/dL 14 0 13 5   HEMATOCRIT % 41 6 39 2   MCV fL 88 88   PLATELETS Thousands/uL 213 197   MCH pg 29 7 30 3   MCHC g/dL 33 7 34 4   RDW % 12 5 12 3   MPV fL 10 5 10 8   NRBC AUTO /100 WBCs  --  0         CMP:  Results from last 7 days   Lab Units 01/04/21  0412 01/03/21  0515 01/02/21  1102   POTASSIUM mmol/L 4 3 4 3 4 2   CHLORIDE mmol/L 100 101 102   CO2 mmol/L 27 25 25   BUN mg/dL 19 17 18   CREATININE mg/dL 1 18 0 97 1 00   CALCIUM mg/dL 8 9 8 9 8 3   AST U/L  --  16 24   ALT U/L  --  39 42   ALK PHOS U/L  --  56 54   EGFR ml/min/1 73sq m 64 81 78       BMP:  Results from last 7 days   Lab Units 01/04/21  0412 01/03/21  0515 01/02/21  1102   POTASSIUM mmol/L 4 3 4 3 4 2   CHLORIDE mmol/L 100 101 102   CO2 mmol/L 27 25 25   BUN mg/dL 19 17 18   CREATININE mg/dL 1 18 0 97 1 00   CALCIUM mg/dL 8 9 8 9 8 3       BNP:   Magnesium:   Results from last 7 days   Lab Units 01/03/21  0515 01/02/21  1102   MAGNESIUM mg/dL 2 4 2 1       Coags:   Results from last 7 days   Lab Units 01/02/21  1144   PTT seconds 32   INR  1 09       TSH:   1 282    Hemoglobin A1C:       Lipid Profile: 7/28/2020: C 232  T 303  H 35  L 136  Non   Cardiac testing: No prior cardiac testing  Imaging: I have personally reviewed pertinent reports  Xr Chest 1 View Portable: Result Date: 1/2/2021  Narrative: CHEST INDICATION:   aflutter  COMPARISON:  None EXAM PERFORMED/VIEWS:  XR CHEST PORTABLE FINDINGS: Cardiomediastinal silhouette appears unremarkable  The lungs are clear  No pneumothorax or pleural effusion  Osseous structures appear within normal limits for patient age  Impression: No acute cardiopulmonary disease  Workstation performed: [id number]       EKG reviewed personally: EKG on admission  Atrial flutter with RVR  Telemetry: Atrial flutter with RVR  Counseling / Coordination of Care  Total floor / unit time spent today 40 minutes  Greater than 50% of total time was spent with the patient and / or family counseling and / or coordination of care  A description of the counseling / coordination of care: Discussed atrial fibrillation at length with the patient  Discussed need for further outpatient testing  We discussed indications for anticoagulation  I did place a call to the patient's son, who is a psychiatrist in [place], to update him and am awaiting his return call  Discussed with ICU attending who will do sedation for BONG/CV  Discussed with Dr Joselyn Reyes        Code Status: Level 1 - Full Code

## 2021-01-04 NOTE — PROCEDURES
Procedure: BONG/ DCCV    Indication: Atrial flutter    :  Carolynn Muir DO      Anti-coagulation: Eliquis    Anesthesia: Conscious sedation provided by anesthesiology with propofol  BONG findings:  No LISHA thrombus  Electrical Pad Placement: Anteroposteriorly interscapular region and upper sternum  Successful cardioversion following a single synchronized biphasic shock at 100 J  Complications: None    Post Procedure Findings: Sinus bradycardia post procedure

## 2021-01-04 NOTE — ASSESSMENT & PLAN NOTE
New onset atrial flutter of unclear duration  Chads Vasc 2  Started on Eliquis anticoagulation  Rapid ventricular rates which had not been controlled despite up titration of medication  BONG guided cardioversion 1/4/2020  Patient likely has underlying sleep apnea and will need outpatient sleep study (apnea noted at time of BONG/CV and also by nursing)  Will arrange sleep medicine evaluation as soon as possible as patient is agreeable  Patient will also need outpatient ischemic workup (although doubt cause)  We discussed the need to cut back on caffeine intake  Would recommend continuing either metoprolol 75 mg twice daily for discharge  Will need at a minimum 4 weeks of on interrupted anticoagulation with long-term anticoagulation to be determined  Low threshold for EP evaluation and atrial flutter ablation  Discussed [address] monitoring device as well as iWatch which may be helpful in monitoring for recurrent atrial flutter as the patient was asymptomatic with rapid rates  Follow up with PCP as well as cardiology (either myself or [facility] per patient preference)

## 2021-01-04 NOTE — CASE MANAGEMENT
CM was consulted for medications  Needs eliquis for dc  Followed up with [facility] and patient needs a prior auth  CM called [facility] and spoke to Isaac Gonzalez at [phone] and found out prior auth is needed, however it was initiated already and approved-- Case reference is  33  CM spoke to [id number] [facility] at [facility] and cost will be $10   CM called back to [facility] to assure they knew the above information  Lindsay at [facility] said yes and the cost will be $10 per month  Post dc PCP appointment was made for patient and placed on AVS     No other needs identified at this time

## 2021-01-04 NOTE — PLAN OF CARE
Problem: PAIN - ADULT  Goal: Verbalizes/displays adequate comfort level or baseline comfort level  Description: Interventions:  - Encourage patient to monitor pain and request assistance  - Assess pain using appropriate pain scale  - Administer analgesics based on type and severity of pain and evaluate response  - Implement non-pharmacological measures as appropriate and evaluate response  - Consider cultural and social influences on pain and pain management  - Notify physician/advanced practitioner if interventions unsuccessful or patient reports new pain  Outcome: Progressing     Problem: INFECTION - ADULT  Goal: Absence or prevention of progression during hospitalization  Description: INTERVENTIONS:  - Assess and monitor for signs and symptoms of infection  - Monitor lab/diagnostic results  - Monitor all insertion sites, i e  indwelling lines, tubes, and drains  - Monitor endotracheal if appropriate and nasal secretions for changes in amount and color  - [place] appropriate cooling/warming therapies per order  - Administer medications as ordered  - Instruct and encourage patient and family to use good hand hygiene technique  - Identify and instruct in appropriate isolation precautions for identified infection/condition  Outcome: Progressing     Problem: SAFETY ADULT  Goal: Patient will remain free of falls  Description: INTERVENTIONS:  - Assess patient frequently for physical needs  -  Identify cognitive and physical deficits and behaviors that affect risk of falls    -  [place] fall precautions as indicated by assessment   - Educate patient/family on patient safety including physical limitations  - Instruct patient to call for assistance with activity based on assessment  - Modify environment to reduce risk of injury  - Consider OT/PT consult to assist with strengthening/mobility  Outcome: Progressing  Goal: Maintain or return to baseline ADL function  Description: INTERVENTIONS:  -  Assess patient's ability to carry out ADLs; assess patient's baseline for ADL function and identify physical deficits which impact ability to perform ADLs (bathing, care of mouth/teeth, toileting, grooming, dressing, etc )  - Assess/evaluate cause of self-care deficits   - Assess range of motion  - Assess patient's mobility; develop plan if impaired  - Assess patient's need for assistive devices and provide as appropriate  - Encourage maximum independence but intervene and supervise when necessary  - Involve family in performance of ADLs  - Assess for home care needs following discharge   - Consider OT consult to assist with ADL evaluation and planning for discharge  - Provide patient education as appropriate  Outcome: Progressing  Goal: Maintain or return mobility status to optimal level  Description: INTERVENTIONS:  - Assess patient's baseline mobility status (ambulation, transfers, stairs, etc )    - Identify cognitive and physical deficits and behaviors that affect mobility  - Identify mobility aids required to assist with transfers and/or ambulation (gait belt, sit-to-stand, lift, walker, cane, etc )  - [place] fall precautions as indicated by assessment  - Record patient progress and toleration of activity level on Mobility SBAR; progress patient to next Phase/Stage  - Instruct patient to call for assistance with activity based on assessment  - Consider rehabilitation consult to assist with strengthening/weightbearing, etc   Outcome: Progressing     Problem: DISCHARGE PLANNING  Goal: Discharge to home or other facility with appropriate resources  Description: INTERVENTIONS:  - Identify barriers to discharge w/patient and caregiver  - Arrange for needed discharge resources and transportation as appropriate  - Identify discharge learning needs (meds, wound care, etc )  - Arrange for interpretive services to assist at discharge as needed  - Refer to Case Management Department for coordinating discharge planning if the patient needs post-hospital services based on physician/advanced practitioner order or complex needs related to functional status, cognitive ability, or social support system  Outcome: Progressing     Problem: Knowledge Deficit  Goal: Patient/family/caregiver demonstrates understanding of disease process, treatment plan, medications, and discharge instructions  Description: Complete learning assessment and assess knowledge base  Interventions:  - Provide teaching at level of understanding  - Provide teaching via preferred learning methods  Outcome: Progressing     Problem: CARDIOVASCULAR - ADULT  Goal: Maintains optimal cardiac output and hemodynamic stability  Description: INTERVENTIONS:  - Monitor I/O, vital signs and rhythm  - Monitor for S/S and trends of decreased cardiac output  - Administer and titrate ordered vasoactive medications to optimize hemodynamic stability  - Assess quality of pulses, skin color and temperature  - Assess for signs of decreased coronary artery perfusion  - Instruct patient to report change in severity of symptoms  Outcome: Progressing  Goal: Absence of cardiac dysrhythmias or at baseline rhythm  Description: INTERVENTIONS:  - Continuous cardiac monitoring, vital signs, obtain 12 lead EKG if ordered  - Administer antiarrhythmic and heart rate control medications as ordered  - Monitor electrolytes and administer replacement therapy as ordered  Outcome: Progressing     Problem: Potential for Falls  Goal: Patient will remain free of falls  Description: INTERVENTIONS:  - Assess patient frequently for physical needs  -  Identify cognitive and physical deficits and behaviors that affect risk of falls    -  [place] fall precautions as indicated by assessment   - Educate patient/family on patient safety including physical limitations  - Instruct patient to call for assistance with activity based on assessment  - Modify environment to reduce risk of injury  - Consider OT/PT consult to assist with strengthening/mobility  Outcome: Progressing

## 2021-01-04 NOTE — ANESTHESIA PREPROCEDURE EVALUATION
Procedure:  BONG    Relevant Problems   ANESTHESIA (within normal limits)      CARDIO   (+) Atrial flutter with rapid ventricular response (HCC)   (+) Hypertensive urgency (Hypertensive urgency on admission)      ENDO (within normal limits)      GI/HEPATIC (within normal limits)      /RENAL  Acute cystitis with hematuria      HEMATOLOGY   (+) Coagulation disorder (HCC) (Patient on AG)      MUSCULOSKELETAL (within normal limits)      NEURO/PSYCH (within normal limits)      PULMONARY   (+) Sleep apnea ( Needs sleep study)   (-) Oxygen dependent   (-) Smoking        Physical Exam    Airway    Mallampati score: II  TM Distance: >3 FB  Neck ROM: full     Dental   Comment: No loose/chipped,     Cardiovascular  Rhythm: irregular, Rate: abnormal,     Pulmonary  Breath sounds clear to auscultation,     Other Findings        Anesthesia Plan  ASA Score- 3     Anesthesia Type- IV sedation with anesthesia with ASA Monitors  Additional Monitors:   Airway Plan:           Plan Factors-Exercise tolerance (METS): <4 METS  Chart reviewed  EKG reviewed  Existing labs reviewed  Patient summary reviewed  Patient is not a current smoker  Obstructive sleep apnea risk education given perioperatively  Induction- intravenous  Postoperative Plan-     Informed Consent- Anesthetic plan and risks discussed with patient  I personally reviewed this patient with the CRNA  Discussed and agreed on the Anesthesia Plan with the CRNA  John Richard

## 2021-01-04 NOTE — PROGRESS NOTES
Admit with hypertensive urgency  S/p Cardioversion  Anticipate dc tomorrow  CM met with patient at the bedside,baseline information  was obtained  CM discussed the role of CM in helping the patient develop a discharge plan and assist the patient in carry out their plan  01/04/21 2244   Patient Information   Mental Status Alert   Primary Caregiver Self   Support System Immediate family   Activities of Daily Living Prior to Admission   Functional Status Independent   Assistive Device No device needed   Living Arrangement House;Lives with someone   [address] of Transportation   Means of Transport to [facility]: Drive Self       Patient has identified: wife, Kristin Gilbert as his primary   She is able to assist upon discharge  No POA: No advance directive:  Pt verbalized Kristin Gilbert would be the person assisting with decisions with making if need be  At this time patient is not interested in receiving information  PCP:    Leonardo Rea         Pt has a prescription plan and uses [facility]  Patient is Not a [employer]:    Pt denies SA/MH history  Resides with wife  Has 3 adult children  Does work  House set up: 0 steps to enter, powder room on 1st floor with bedroom on the second  Functional level PTA: very I/pta  DME use:  none  Prior use of Home Care or STR: none  Transportation: patient drives and car is in the lot  [employer]: none    CM reviewed d/c planning process including the following: identifying help at home, patient preference for d/c planning needs, availability of treatment team to discuss questions or concerns patient and/or family may have regarding understanding medications and recognizing signs and symptoms once discharged  CM also encouraged patient to follow up with all recommended appointments after discharge   Patient advised of importance for patient and family to participate in managing patients medical well being  DC plan is home- Eliquis is covered at $10 per Month  Post PCP appointment was made  Goal of patient upon discharge is to go home

## 2021-01-04 NOTE — NURSING NOTE
ST depression noted on telemetry-new since 1900  Pt with no chest pressure or pain or SOB  EKG completed-ST abnormalities    Gabby CASTANEDA evaluated EKG-no changes-will continue to monitor

## 2021-01-04 NOTE — PROGRESS NOTES
Progress Note - Thad Men 1955, 76 y o  male MRN: [id number]    Unit/Bed#: -01 Encounter: [id number]    Primary Care Provider: Joseph Dinh MD   Date and time admitted to hospital: 1/2/2021 10:38 AM        * Atrial flutter with rapid ventricular response [facility])  Assessment & Plan  New onset a flutter  Unclear when it started as patient is asymptomatic and it was discovered incidentally on urgent care visit  Heart rate 137 a flutter on admission  Receive several doses of IV Cardizem and now currently on a Cardizem drip  Will continue to titrate to keep heart rate less than 120  Also initiated on metoprolol 50 mg q 8  Cardiology consulted  Will place on Eliquis 5 mg twice daily for anticoagulation  2D echo to be ordered for Monday  Patient admitted to level 2 step-down as Cardizem drip will need to be titrated     1/3:  Overnight patient's heart rates went down to the 80s and 90s however now back up to 120s  Back on Cardizem drip  Increase metoprolol to 50 mg q 6  Discontinue Cardizem drip and placed on oral Cardizem 30 q  6  Also give 1 dose of digoxin 250 mcg IV  2D echo scheduled for tomorrow  Keep NPO after midnight in case Cardiology plans cardioversion  Continue Eliquis  Will accept a heart rate of 120s today  Is difficult to maintain rate control for him and hence he may be a candidate for cardioversion    No evidence of any heart failure symptoms at this time  1/4:  Heart rate still uncontrolled in the 120s in a flutter  Discussed with Cardiology  Plan for cardioversion today and 2D echo    Hypertensive urgency  Assessment & Plan  Patient had elevated blood pressure on presentation  Now received several doses of Cardizem following which it has improved  Hold lisinopril for now and continue Cardizem and metoprolol    Acute cystitis without hematuria  Assessment & Plan  Patient presented with acute cystitis without hematuria for the last 3-4 days    Was taking over-the-counter supplements to help with the dysuria  Placed on IV ceftriaxone for now  Even though urine culture is negative will still complete 5 day course of antibiotics  Transition to oral Keflex today for 3 more days      VTE Pharmacologic Prophylaxis:   Pharmacologic: Apixaban (Eliquis)  Mechanical VTE Prophylaxis in Place: Yes    Patient Centered Rounds: I have performed bedside rounds with nursing staff today  Discussions with Specialists or Other Care Team Provider:  Discussed with Cardiology    Education and Discussions with Family / Patient:  Discussed with patient bedside    Time Spent for Care: 30 minutes  More than 50% of total time spent on counseling and coordination of care as described above  Current Length of Stay: 2 day(s)    Current Patient Status: Inpatient   Certification Statement: The patient will continue to require additional inpatient hospital stay due to A flutter with RVR    Discharge Plan: For cardioversion today  Code Status: Level 1 - Full Code      Subjective:   Patient denies any chest pain or palpitations or shortness of breath  He is asymptomatic however still is in a flutter with heart rate in the 120s    Objective:     Vitals:   Temp (24hrs), Av 3 °F (36 3 °C), Min:96 6 °F (35 9 °C), Max:97 9 °F (36 6 °C)    Temp:  [96 6 °F (35 9 °C)-97 9 °F (36 6 °C)] 97 9 °F (36 6 °C)  HR:  [105-129] 123  Resp:  [14-24] 16  BP: ()/(72-96) 96/72  SpO2:  [94 %-100 %] 100 %  Body mass index is 33 09 kg/m²  Input and Output Summary (last 24 hours): Intake/Output Summary (Last 24 hours) at 2021 1135  Last data filed at 2021 0701  Gross per 24 hour   Intake 403 13 ml   Output 1875 ml   Net -1471 87 ml       Physical Exam:     Physical Exam  Vitals signs and nursing note reviewed  Constitutional:       Appearance: Normal appearance  HENT:      Head: Normocephalic and atraumatic        Right Ear: External ear normal       Left Ear: External ear normal  Nose: Nose normal       Mouth/Throat:      Pharynx: Oropharynx is clear  Neck:      Musculoskeletal: Normal range of motion and neck supple  Cardiovascular:      Rate and Rhythm: Regular rhythm  Tachycardia present  Heart sounds: Normal heart sounds  Pulmonary:      Effort: Pulmonary effort is normal       Breath sounds: Normal breath sounds  Abdominal:      General: Bowel sounds are normal       Palpations: Abdomen is soft  Tenderness: There is no abdominal tenderness  Musculoskeletal: Normal range of motion  Skin:     General: Skin is warm and dry  Capillary Refill: Capillary refill takes less than 2 seconds  Neurological:      General: No focal deficit present  Mental Status: He is alert and oriented to person, place, and time  Psychiatric:         Mood and Affect: Mood normal            Additional Data:     Labs:    Results from last 7 days   Lab Units 01/03/21  0515 01/02/21  1102   WBC Thousand/uL 9 23 10 79*   HEMOGLOBIN g/dL 14 0 13 5   HEMATOCRIT % 41 6 39 2   PLATELETS Thousands/uL 213 197   NEUTROS PCT %  --  72   LYMPHS PCT %  --  16   MONOS PCT %  --  9   EOS PCT %  --  2     Results from last 7 days   Lab Units 01/04/21  0412 01/03/21  0515   SODIUM mmol/L 134* 134*   POTASSIUM mmol/L 4 3 4 3   CHLORIDE mmol/L 100 101   CO2 mmol/L 27 25   BUN mg/dL 19 17   CREATININE mg/dL 1 18 0 97   ANION GAP mmol/L 7 8   CALCIUM mg/dL 8 9 8 9   ALBUMIN g/dL  --  3 6   TOTAL BILIRUBIN mg/dL  --  0 48   ALK PHOS U/L  --  56   ALT U/L  --  39   AST U/L  --  16   GLUCOSE RANDOM mg/dL 103 97     Results from last 7 days   Lab Units 01/02/21  1144   INR  1 09             Results from last 7 days   Lab Units 01/02/21  1836   LACTIC ACID mmol/L 1 6           * I Have Reviewed All Lab Data Listed Above  * Additional Pertinent Lab Tests Reviewed:  All Labs For Current Hospital Admission Reviewed    Imaging:    Imaging Reports Reviewed Today Include:  None  Imaging Personally Reviewed by Myself Includes:  None    Recent Cultures (last 7 days):     Results from last 7 days   Lab Units 01/02/21  1107   URINE CULTURE  No Growth <1000 cfu/mL       Last 24 Hours Medication List:   Current Facility-Administered Medications   Medication Dose Route Frequency Provider Last Rate    acetaminophen  650 mg Oral Q6H PRN Berenice Delgado MD      apixaban  5 mg Oral BID Berenice Delgado MD      cephalexin  500 mg Oral Q12H [address] Berenice Delgado MD      diltiazem  30 mg Oral Q6H [address] Berenice Delgado MD      Ketamine HCl  0 2 mg/kg Intravenous Once Gypsy Score, DO      metoprolol tartrate  50 mg Oral 4x Daily Berenice Delgado MD      ondansetron  4 mg Intravenous Q6H PRN Berenice Delgado MD      phenazopyridine  100 mg Oral TID With Meals Berenice Delgado MD          Today, Patient Was Seen By: Berenice Delgado MD    ** Please Note: Dictation voice to text software may have been used in the creation of this document   **

## 2021-01-04 NOTE — PLAN OF CARE
Pt's HR remains 120's, aflutter  Pt denies dyspnea, chest pain, or pressure  EKG completed this shift for ST depression on monitor-no changes noted  BMP drawn and only abnormal result is Na 134  Pt NPO after midnight for possible BONG with CV however cardiologist is consulted and has not evaluated pt yet  Pt also ordered for regular echo  Pt given benedryl to assist with sleeping  Pt voiding without difficulty, urgency, or burning  Per MD note, pt to be switched to po abx today  Per care everywhere, pt with pcn allergy  Discussed allergy with pt  Pt reports he has no problems with antibiotics and is unsure where this came from  Pt NKA per his report        Problem: PAIN - ADULT  Goal: Verbalizes/displays adequate comfort level or baseline comfort level  Description: Interventions:  - Encourage patient to monitor pain and request assistance  - Assess pain using appropriate pain scale  - Administer analgesics based on type and severity of pain and evaluate response  - Implement non-pharmacological measures as appropriate and evaluate response  - Consider cultural and social influences on pain and pain management  - Notify physician/advanced practitioner if interventions unsuccessful or patient reports new pain  Outcome: Progressing     Problem: INFECTION - ADULT  Goal: Absence or prevention of progression during hospitalization  Description: INTERVENTIONS:  - Assess and monitor for signs and symptoms of infection  - Monitor lab/diagnostic results  - Monitor all insertion sites, i e  indwelling lines, tubes, and drains  - Monitor endotracheal if appropriate and nasal secretions for changes in amount and color  - [place] appropriate cooling/warming therapies per order  - Administer medications as ordered  - Instruct and encourage patient and family to use good hand hygiene technique  - Identify and instruct in appropriate isolation precautions for identified infection/condition  Outcome: Progressing Problem: SAFETY ADULT  Goal: Patient will remain free of falls  Description: INTERVENTIONS:  - Assess patient frequently for physical needs  -  Identify cognitive and physical deficits and behaviors that affect risk of falls    -  [place] fall precautions as indicated by assessment   - Educate patient/family on patient safety including physical limitations  - Instruct patient to call for assistance with activity based on assessment  - Modify environment to reduce risk of injury  - Consider OT/PT consult to assist with strengthening/mobility  Outcome: Progressing  Goal: Maintain or return to baseline ADL function  Description: INTERVENTIONS:  -  Assess patient's ability to carry out ADLs; assess patient's baseline for ADL function and identify physical deficits which impact ability to perform ADLs (bathing, care of mouth/teeth, toileting, grooming, dressing, etc )  - Assess/evaluate cause of self-care deficits   - Assess range of motion  - Assess patient's mobility; develop plan if impaired  - Assess patient's need for assistive devices and provide as appropriate  - Encourage maximum independence but intervene and supervise when necessary  - Involve family in performance of ADLs  - Assess for home care needs following discharge   - Consider OT consult to assist with ADL evaluation and planning for discharge  - Provide patient education as appropriate  Outcome: Progressing  Goal: Maintain or return mobility status to optimal level  Description: INTERVENTIONS:  - Assess patient's baseline mobility status (ambulation, transfers, stairs, etc )    - Identify cognitive and physical deficits and behaviors that affect mobility  - Identify mobility aids required to assist with transfers and/or ambulation (gait belt, sit-to-stand, lift, walker, cane, etc )  - [place] fall precautions as indicated by assessment  - Record patient progress and toleration of activity level on Mobility SBAR; progress patient to next Phase/Stage  - Instruct patient to call for assistance with activity based on assessment  - Consider rehabilitation consult to assist with strengthening/weightbearing, etc   Outcome: Progressing     Problem: DISCHARGE PLANNING  Goal: Discharge to home or other facility with appropriate resources  Description: INTERVENTIONS:  - Identify barriers to discharge w/patient and caregiver  - Arrange for needed discharge resources and transportation as appropriate  - Identify discharge learning needs (meds, wound care, etc )  - Arrange for interpretive services to assist at discharge as needed  - Refer to Case Management Department for coordinating discharge planning if the patient needs post-hospital services based on physician/advanced practitioner order or complex needs related to functional status, cognitive ability, or social support system  Outcome: Progressing     Problem: Knowledge Deficit  Goal: Patient/family/caregiver demonstrates understanding of disease process, treatment plan, medications, and discharge instructions  Description: Complete learning assessment and assess knowledge base    Interventions:  - Provide teaching at level of understanding  - Provide teaching via preferred learning methods  Outcome: Progressing     Problem: CARDIOVASCULAR - ADULT  Goal: Maintains optimal cardiac output and hemodynamic stability  Description: INTERVENTIONS:  - Monitor I/O, vital signs and rhythm  - Monitor for S/S and trends of decreased cardiac output  - Administer and titrate ordered vasoactive medications to optimize hemodynamic stability  - Assess quality of pulses, skin color and temperature  - Assess for signs of decreased coronary artery perfusion  - Instruct patient to report change in severity of symptoms  Outcome: Progressing     Problem: Potential for Falls  Goal: Patient will remain free of falls  Description: INTERVENTIONS:  - Assess patient frequently for physical needs  -  Identify cognitive and physical deficits and behaviors that affect risk of falls    -  [place] fall precautions as indicated by assessment   - Educate patient/family on patient safety including physical limitations  - Instruct patient to call for assistance with activity based on assessment  - Modify environment to reduce risk of injury  - Consider OT/PT consult to assist with strengthening/mobility  Outcome: Progressing     Problem: CARDIOVASCULAR - ADULT  Goal: Absence of cardiac dysrhythmias or at baseline rhythm  Description: INTERVENTIONS:  - Continuous cardiac monitoring, vital signs, obtain 12 lead EKG if ordered  - Administer antiarrhythmic and heart rate control medications as ordered  - Monitor electrolytes and administer replacement therapy as ordered  Outcome: Not Progressing

## 2021-01-04 NOTE — ASSESSMENT & PLAN NOTE
New onset a flutter  Unclear when it started as patient is asymptomatic and it was discovered incidentally on urgent care visit  Heart rate 137 a flutter on admission  Receive several doses of IV Cardizem and now currently on a Cardizem drip  Will continue to titrate to keep heart rate less than 120  Also initiated on metoprolol 50 mg q 8  Cardiology consulted  Will place on Eliquis 5 mg twice daily for anticoagulation  2D echo to be ordered for Monday  Patient admitted to level 2 step-down as Cardizem drip will need to be titrated     1/3:  Overnight patient's heart rates went down to the 80s and 90s however now back up to 120s  Back on Cardizem drip  Increase metoprolol to 50 mg q 6  Discontinue Cardizem drip and placed on oral Cardizem 30 q  6  Also give 1 dose of digoxin 250 mcg IV  2D echo scheduled for tomorrow  Keep NPO after midnight in case Cardiology plans cardioversion  Continue Eliquis  Will accept a heart rate of 120s today  Is difficult to maintain rate control for him and hence he may be a candidate for cardioversion    No evidence of any heart failure symptoms at this time  1/4:  Heart rate still uncontrolled in the 120s in a flutter  Discussed with Cardiology    Plan for cardioversion today and 2D echo

## 2021-01-04 NOTE — ANESTHESIA POSTPROCEDURE EVALUATION
Post-Op Assessment Note    CV Status:  Stable  Pain Score: 0    Pain management: adequate     Mental Status:  Sleepy   Hydration Status:  Stable   PONV Controlled:  None   Airway Patency:  Patent   Two or more mitigation strategies used for obstructive sleep apnea   Post Op Vitals Reviewed: Yes      Staff: Anesthesiologist   Comments: Patient resting comfortably, vital signs stable, no complications from anesthesia        No complications documented      /65 (01/04/21 1245)    Temp      Pulse 62 (01/04/21 1245)   Resp 13 (01/04/21 1245)    SpO2 98 % (01/04/21 1245)

## 2021-01-05 VITALS
DIASTOLIC BLOOD PRESSURE: 84 MMHG | HEIGHT: 72 IN | OXYGEN SATURATION: 100 % | HEART RATE: 64 BPM | BODY MASS INDEX: 33.05 KG/M2 | SYSTOLIC BLOOD PRESSURE: 135 MMHG | TEMPERATURE: 97.7 F | WEIGHT: 244 LBS | RESPIRATION RATE: 12 BRPM

## 2021-01-05 DIAGNOSIS — I48.92 ATRIAL FLUTTER WITH RAPID VENTRICULAR RESPONSE (HCC): Primary | ICD-10-CM

## 2021-01-05 DIAGNOSIS — G47.30 SLEEP APNEA, UNSPECIFIED TYPE: ICD-10-CM

## 2021-01-05 PROCEDURE — 99239 HOSP IP/OBS DSCHRG MGMT >30: CPT | Performed by: FAMILY MEDICINE

## 2021-01-05 PROCEDURE — 99232 SBSQ HOSP IP/OBS MODERATE 35: CPT | Performed by: INTERNAL MEDICINE

## 2021-01-05 RX ORDER — CEPHALEXIN 500 MG/1
500 CAPSULE ORAL EVERY 12 HOURS SCHEDULED
Qty: 4 CAPSULE | Refills: 0 | Status: SHIPPED | OUTPATIENT
Start: 2021-01-05 | End: 2021-01-07

## 2021-01-05 RX ORDER — PHENAZOPYRIDINE HYDROCHLORIDE 100 MG/1
100 TABLET, FILM COATED ORAL
Qty: 10 TABLET | Refills: 0 | Status: SHIPPED | OUTPATIENT
Start: 2021-01-05 | End: 2021-04-23

## 2021-01-05 RX ORDER — MULTIVITAMIN WITH IRON
1 TABLET ORAL 2 TIMES DAILY
Qty: 60 TABLET | Refills: 0 | Status: SHIPPED | OUTPATIENT
Start: 2021-01-05 | End: 2021-01-12 | Stop reason: DRUGHIGH

## 2021-01-05 RX ORDER — METOPROLOL TARTRATE 75 MG/1
75 TABLET, FILM COATED ORAL EVERY 12 HOURS SCHEDULED
Qty: 180 TABLET | Refills: 0 | Status: SHIPPED | OUTPATIENT
Start: 2021-01-05 | End: 2021-01-12 | Stop reason: DRUGHIGH

## 2021-01-05 RX ADMIN — APIXABAN 5 MG: 5 TABLET, FILM COATED ORAL at 08:36

## 2021-01-05 RX ADMIN — PHENAZOPYRIDINE 100 MG: 100 TABLET ORAL at 11:57

## 2021-01-05 RX ADMIN — METOPROLOL TARTRATE 75 MG: 50 TABLET, FILM COATED ORAL at 08:36

## 2021-01-05 RX ADMIN — CEPHALEXIN 500 MG: 250 CAPSULE ORAL at 08:36

## 2021-01-05 RX ADMIN — PHENAZOPYRIDINE 100 MG: 100 TABLET ORAL at 07:48

## 2021-01-05 NOTE — ASSESSMENT & PLAN NOTE
Likely significant sleep apnea based on response to anesthesia yesterday and observed apneic episodes by nursing  Will arrange sleep medicine evaluation

## 2021-01-05 NOTE — DISCHARGE SUMMARY
Discharge- Bhavya Menard 1955, 76 y o  male MRN: [id number]    Unit/Bed#: -01 Encounter: [id number]    Primary Care Provider: Isaiah Smith MD   Date and time admitted to hospital: 1/2/2021 10:38 AM        * Atrial flutter with rapid ventricular response [facility])  Assessment & Plan  New onset a flutter  Unclear when it started as patient is asymptomatic and it was discovered incidentally on urgent care visit  Heart rate 137 a flutter on admission  Receive several doses of IV Cardizem and then on Cardizem drip  initiated on metoprolol 75 mg BID  Eliquis 5 mg twice daily for anticoagulation  S/p BONG guided Cardioversion on 1/4/21  Cardiology consult appreciated  Patient will need follow-up with cardiology-refill provided to Dr Sulaiman Valdovinos  Patient also needs to be evaluated by sleep study for possible/suspected sleep apnea    Sleep apnea  Assessment & Plan  Suspected as witnessed during BONG cardioversion as well as nurse  Could be the cause of Afib/a flutter  Referral provided for sleep medicine    Hypertensive urgency  Assessment & Plan  Patient had elevated blood pressure on presentation  With  several doses of Cardizem following which it has improved  Continue metoprolol  DC lisinopril, due to lower end of blood pressure    Acute cystitis without hematuria  Assessment & Plan  Patient presented with acute cystitis without hematuria for the last 3-4 days  Was taking over-the-counter supplements to help with the dysuria  Placed on IV ceftriaxone for now  Even though urine culture is negative will still complete 5 day course of antibiotics    Transition to oral Keflex  for 3 more days          Discharging Physician / Practitioner: Amanda Sanchez MD  PCP: Isaiah Smith MD  Admission Date:   Admission Orders (From admission, onward)     Ordered        01/02/21 1258  Inpatient Admission  Once                   Discharge Date: 01/05/21    Resolved Problems  Date Reviewed: 1/5/2021 None          Consultations During Hospital Stay:  · Cardiology    Procedures Performed:   XR chest 1 view portable   Final Result by Ellen Rodriguez MD (01/02 1541)      No acute cardiopulmonary disease  Workstation performed: [id number]         ·   · BONG Cardioversion on 1/4/21    Significant Findings / Test Results:   Lab Results   Component Value Date    WBC 9 23 01/03/2021    HGB 14 0 01/03/2021    HCT 41 6 01/03/2021    MCV 88 01/03/2021     01/03/2021   ·   Lab Results   Component Value Date    SODIUM 134 (L) 01/04/2021    K 4 3 01/04/2021     01/04/2021    CO2 27 01/04/2021    AGAP 7 01/04/2021    BUN 19 01/04/2021    CREATININE 1 18 01/04/2021    GLUC 103 01/04/2021    CALCIUM 8 9 01/04/2021    AST 16 01/03/2021    ALT 39 01/03/2021    ALKPHOS 56 01/03/2021    TP 7 9 01/03/2021    TBILI 0 48 01/03/2021    EGFR 64 01/04/2021   ·   No results found for: CHOLESTEROL  No results found for: HDL  No results found for: TRIG  · No results found for: [facility]  ·     Incidental Findings:   · As mentioned above     Test Results Pending at Discharge (will require follow up):   · none     Outpatient Tests Requested:  · Need Sleep study- sleep medicine referral provided  · Needs follow up with cardiology-Referral provided (Dr Cuco Felder)    Complications:  none    Reason for Admission: dysuria    Hospital Course:     Kirill Mitchell is a 76 y o  male patient who originally presented to the hospital on 1/2/2021 due to dysuria  Patient came to the hospital with dysuria , during the workup, patient was found atrial flutter with repeat ventricular response  Patient received IV Cardizem and then Cardizem drip  With the treatment, patient is still remained on RVR, patient evaluated by Cardiology and went for BONG guided cardioversion on 01/04/2021  Patient started on metoprolol 75 mg b i d  By Cardiology and started on Eliquis    With the treatment, patient's condition remained stable  Patient will be discharge home, patient is to follow-up with PCP, Cardiology  Patient also found suspected sleep apnea as documented Cardiology nursing note  Sleep medicine referral provided  Due to acute cystitis, patient received IV antibiotic initially, later on placed on Keflex p o  Patient is to continue Keflex 3 more days  Patient's lisinopril discontinued due to low range of the blood pressure  Patient is to continue metoprolol and Eliquis as well as other medication except lisinopril  Referral provided for Sleep Medicine, Cardiology  Please see above list of diagnoses and related plan for additional information  Condition at Discharge: stable     Discharge Day Visit / Exam:     Subjective:  Seen and evaluated during the round  Patient denies any significant complaint  Vitals: Blood Pressure: 135/84 (01/05/21 0700)  Pulse: 64 (01/05/21 0700)  Temperature: 97 7 °F (36 5 °C) (01/05/21 0700)  Temp Source: Oral (01/05/21 0700)  Respirations: 12 (01/05/21 0700)  Height: 6' (182 9 cm) (01/02/21 1630)  Weight - Scale: 111 kg (244 lb) (01/02/21 1044)  SpO2: 100 % (01/05/21 0700)  Exam:   Physical Exam  Vitals signs and nursing note reviewed  Constitutional:       Appearance: He is not diaphoretic  HENT:      Head: Normocephalic  Mouth/Throat:      Mouth: Mucous membranes are moist    Eyes:      General: No scleral icterus  Conjunctiva/sclera: Conjunctivae normal    Neck:      Musculoskeletal: Normal range of motion  Cardiovascular:      Rate and Rhythm: Normal rate  Heart sounds: No murmur  No friction rub  No gallop  Pulmonary:      Effort: Pulmonary effort is normal  No respiratory distress  Breath sounds: No stridor  No wheezing, rhonchi or rales  Abdominal:      General: Bowel sounds are normal  There is no distension  Palpations: There is no mass  Tenderness: There is no abdominal tenderness  Hernia: No hernia is present  Musculoskeletal: Normal range of motion  General: No swelling, tenderness, deformity or signs of injury  Lymphadenopathy:      Cervical: No cervical adenopathy  Skin:     General: Skin is warm  Capillary Refill: Capillary refill takes less than 2 seconds  Coloration: Skin is not jaundiced or pale  Neurological:      General: No focal deficit present  Mental Status: He is alert and oriented to person, place, and time  Cranial Nerves: No cranial nerve deficit  Sensory: No sensory deficit  Motor: No weakness  Coordination: Coordination normal    Psychiatric:         Mood and Affect: Mood normal        Discussion with Family: yes    Discharge instructions/Information to patient and family:   See after visit summary for information provided to patient and family  Provisions for Follow-Up Care:  See after visit summary for information related to follow-up care and any pertinent home health orders  Disposition:     Home    For Discharges to [facility] SNF:   · Not Applicable to this Patient - Not Applicable to this Patient    Planned Readmission: if condition get worse     Discharge Statement:  I spent >35 minutes discharging the patient  This time was spent on the day of discharge  I had direct contact with the patient on the day of discharge  Greater than 50% of the total time was spent examining patient, answering all patient questions, arranging and discussing plan of care with patient as well as directly providing post-discharge instructions  Additional time then spent on discharge activities  Discharge Medications:  See after visit summary for reconciled discharge medications provided to patient and family        ** Please Note: This note has been constructed using a voice recognition system **

## 2021-01-05 NOTE — ASSESSMENT & PLAN NOTE
Patient had elevated blood pressure on presentation  With  several doses of Cardizem following which it has improved    Continue metoprolol  DC lisinopril, due to lower end of blood pressure

## 2021-01-05 NOTE — DISCHARGE INSTRUCTIONS
Sleep Apnea   AMBULATORY CARE:   Sleep apnea  is a condition that causes you to stop breathing for 10 seconds or longer during sleep  Obstructive sleep apnea is the most common kind  The muscles and tissues around your throat relax and block air from passing through  Obesity, use of alcohol or cigarettes, or a family history are common causes  Central sleep apnea means your brain does not send signals to the muscles that control breathing  You do not take a breath even though your airway is open  Common causes include medical conditions such as heart failure, being older than 65, or use of opioids  Common signs and symptoms include the following:   · Snoring loudly, snorting, gasping or choking while you sleep, and waking up suddenly because of these    · A hard time thinking, remembering things, or focusing on your tasks the following day    · Headache or nausea    · Waking up often during the night to urinate    · Feeling sleepy, slow, and tired during the day    Call your local emergency number (911 in the [address] [facility]) if:   · You have chest pain or trouble breathing  Call your doctor if:   · You feel tired or depressed  · You have trouble staying awake during the day  · You have trouble thinking clearly  · You have questions or concerns about your condition or care  How sleep apnea is diagnosed:   · Your healthcare provider will ask about your signs and symptoms, when they began, and how bad they are  He or she may ask about medical conditions you have and what medicines you take  Tell your healthcare provider if you smoke and if anyone in your family has sleep apnea  Your healthcare provider may ask the person who sleeps beside you about your signs  · You may need to wear a device that monitors the oxygen level in your blood while you sleep  You may need to have a sleep study (polysomnography) if you have daytime sleepiness   A sleep study helps show your brain, heart, and respiratory system are working during sleep  Sleep studies may monitor the stages of sleep, oxygen levels, body position, eye movement, and snoring  Treatment  depends on the kind of sleep apnea you have:  · A machine  may be used to help you get more air during sleep  A mask may be placed over your nose and mouth, or just your nose  The mask is hooked to the machine  You will get air through the mask  ? A continuous positive airway pressure (CPAP) machine  is used to keep your airway open during sleep  The machine blows a gentle stream of air into the mask when you breathe  This helps keep your airway open so you can breathe more regularly  Extra oxygen may be given through the machine  ? A bilevel positive airway pressure (BiPAP) machine  gives air but lowers the pressure when you breathe out  ? An adaptive servo-ventilator  is a machine that only gives air when it senses you are not breathing  · A mouth device  that looks like a mouth guard stops your tongue and other tissues from blocking airflow  · Surgery  may be needed to remove extra tissues that block your mouth, throat, or nose  Manage or prevent sleep apnea:   · Do not smoke  Nicotine and other chemicals in cigarettes and cigars can cause lung damage  Ask your healthcare provider for information if you currently smoke and need help to quit  E-cigarettes or smokeless tobacco still contain nicotine  Talk to your healthcare provider before you use these products  · Do not drink alcohol or take sedative medicine before you go to sleep  Alcohol and sedatives can relax the muscles and tissues around your throat  This can block the airflow to your lungs  · Maintain a healthy weight  Your healthcare provider can tell you what weight is healthy for you  He or she can help you create a weight loss plan, if needed  The plan will include healthy foods and regular exercise to help you reach your healthy weight   Exercise can also help you sleep and may reduce stress  · Sleep on your side or use pillows designed to prevent sleep apnea  This prevents your tongue or other tissues from blocking your throat  You can also raise the head of your bed  Follow up with your doctor as directed: You may need to have blood tests during your follow-up visits  You will need to work with your healthcare provider to find the right breathing support equipment and settings for you  Write down your questions so you remember to ask them during your visits  © Copyright [address] Information is for End User's use only and may not be sold, redistributed or otherwise used for commercial purposes  All illustrations and images included in CareNotes® are the copyrighted property of A [employer] M [employer] Inc  or [facility]  The above information is an  only  It is not intended as medical advice for individual conditions or treatments  Talk to your doctor, nurse or pharmacist before following any medical regimen to see if it is safe and effective for you  Atrial Flutter   AMBULATORY CARE:   Atrial flutter  is an irregular heartbeat  It reduces your heart's ability to pump blood, which means you do not get enough oxygen  An irregular heartbeat could lead to a life-threatening blood clot or stroke  Common symptoms include the following:   · Pounding or racing heartbeat    · Chest pain     · Shortness of breath    · Weakness or tiredness    · Lightheadedness, dizziness, or fainting    Call 911 for any of the following:   · You have any of the following signs of a stroke:      ? Numbness or drooping on one side of your face     ? Weakness in an arm or leg    ? Confusion or difficulty speaking    ? Dizziness, a severe headache, or vision loss    · You have any of the following signs of a heart attack:      ?  Squeezing, pressure, or pain in your chest    ? You may  also have any of the following:     § Discomfort or pain in your back, neck, jaw, stomach, or arm    § Shortness of breath    § Nausea or vomiting    § Lightheadedness or a sudden cold sweat    Seek care immediately if:  You have any of the following signs of a blood clot:  · You feel lightheaded, are short of breath, and have chest pain  · You cough up blood  · You have swelling, redness, pain, or warmth in your arm or leg  Contact your cardiologist or healthcare provider if:   · Your heart rate is higher or lower than your cardiologist says it should be  · You are bruising and bleeding more easily  · You have questions or concerns about your condition or care  Treatment for atrial flutter  may include any of the following:  · Heart medicines  help control your heart rate and rhythm  · Blood thinners  help prevent blood clots  Clots can cause strokes, heart attacks, and death  The following are general safety guidelines to follow while you are taking a blood thinner:    ? Watch for bleeding and bruising while you take blood thinners  Watch for bleeding from your gums or nose  Watch for blood in your urine and bowel movements  Use a soft washcloth on your skin, and a soft toothbrush to brush your teeth  This can keep your skin and gums from bleeding  If you shave, use an electric shaver  Do not play contact sports  ? Tell your dentist and other healthcare providers that you take a blood thinner  Wear a bracelet or necklace that says you take this medicine  ? Do not start or stop any other medicines unless your healthcare provider tells you to  Many medicines cannot be used with blood thinners  ? Take your blood thinner exactly as prescribed by your healthcare provider  Do not skip does or take less than prescribed  Tell your provider right away if you forget to take your blood thinner, or if you take too much  ? Warfarin  is a blood thinner that you may need to take   The following are things you should be aware of if you take warfarin:     § Foods and medicines can affect the amount of warfarin in your blood  Do not make major changes to your diet while you take warfarin  Warfarin works best when you eat about the same amount of vitamin K every day  Vitamin K is found in green leafy vegetables and certain other foods  Ask for more information about what to eat when you are taking warfarin  § You will need to see your healthcare provider for follow-up visits when you are on warfarin  You will need regular blood tests  These tests are used to decide how much medicine you need  · Cardioversion  is a procedure to return your heart rate and rhythm to normal  This is done with medicine or an electrical shock  · Cardiac ablation  is a procedure that uses heat energy to correct your irregular heartbeat  Ask for more information on cardiac ablation  · Surgery  may be needed to put in a pacemaker or an implanted cardioverter defibrillator (ICD)  These will help control your heart rate and rhythm  Manage atrial flutter:   · Know your target heart rate  Learn how to take your pulse and monitor your heart rate  · Control your blood pressure  Take your blood pressure medicine as directed  · Do not smoke  Nicotine and other chemicals in cigarettes and cigars can cause heart and lung damage  Ask your healthcare provider for information if you currently smoke and need help to quit  E-cigarettes or smokeless tobacco still contain nicotine  Talk to your healthcare provider before you use these products  · Limit alcohol  Women should limit alcohol to 1 drink a day  Men should limit alcohol to 2 drinks a day  A drink of alcohol is 12 ounces of beer, 5 ounces of wine, or 1½ ounces of liquor  · Eat heart-healthy foods  These include fruits, vegetables, whole-grain breads, low-fat dairy products, beans, lean meats, and fish  Replace butter and margarine with heart-healthy oils such as olive oil and canola oil  · Maintain a healthy weight    Ask your healthcare provider how much you should weigh  Ask him to help you create a weight loss plan if you are overweight  Follow up with your cardiologist as directed: You may need ongoing tests or treatment  Write down your questions so you remember to ask them during your visits  © Copyright [address] Information is for End User's use only and may not be sold, redistributed or otherwise used for commercial purposes  All illustrations and images included in CareNotes® are the copyrighted property of [employer]  or [facility] [employer]   The above information is an  only  It is not intended as medical advice for individual conditions or treatments  Talk to your doctor, nurse or pharmacist before following any medical regimen to see if it is safe and effective for you

## 2021-01-05 NOTE — ASSESSMENT & PLAN NOTE
Patient presented with acute cystitis without hematuria for the last 3-4 days  Was taking over-the-counter supplements to help with the dysuria  Placed on IV ceftriaxone for now  Even though urine culture is negative will still complete 5 day course of antibiotics    Transition to oral Keflex  for 3 more days

## 2021-01-05 NOTE — PROGRESS NOTES
Progress Note:Cardiology  Scarlett Sigala 1955, 76 y o  male MRN: [id number]    Unit/Bed#: -01 Encounter: [id number]  Attending Physician: Kory Flores MD   Primary Care Provider: Darrell Howell MD   Date admitted to hospital: 1/2/2021  Length of stay: 3         Sleep apnea  Assessment & Plan  Likely significant sleep apnea based on response to anesthesia yesterday and observed apneic episodes by nursing  Will arrange sleep medicine evaluation  * Atrial flutter with rapid ventricular response ([address] )  Assessment & Plan  New onset atrial flutter of unclear duration  Chads Vasc 2  Started on Eliquis anticoagulation  Rapid ventricular rates which had not been controlled despite up titration of medication  BONG guided cardioversion 1/4/2020  Patient likely has underlying sleep apnea and will need outpatient sleep study (apnea noted at time of BONG/CV and also by nursing)  Will arrange sleep medicine evaluation as soon as possible as patient is agreeable  Patient will also need outpatient ischemic workup (although doubt cause)  We discussed the need to cut back on caffeine intake  Would recommend continuing either metoprolol 75 mg twice daily for discharge  Will need at a minimum 4 weeks of on interrupted anticoagulation with long-term anticoagulation to be determined  Low threshold for EP evaluation and atrial flutter ablation  Discussed [address] monitoring device as well as iWatch which may be helpful in monitoring for recurrent atrial flutter as the patient was asymptomatic with rapid rates  Follow up with PCP as well as cardiology (either myself or [facility] per patient preference)  Subjective:   Patient seen and examined  No significant events overnight  Short burst of atrial flutter yesterday afternoon  Feels well  Lucia Velasquez for discharge  Eliquis affordable  Apnea noted by nursing overnight  Urinating without pain  No chest pain or shortness of breath  Agreeable to sleep evaluation  Review of Systems   Constitution: Negative for diaphoresis, malaise/fatigue and weight gain  HENT: Negative for hearing loss and tinnitus  Cardiovascular: Negative for chest pain, claudication, dyspnea on exertion, leg swelling, orthopnea and palpitations  Respiratory: Negative for cough, shortness of breath and snoring  Gastrointestinal: Negative for abdominal pain, nausea and vomiting  Neurological: Negative for dizziness and light-headedness  Objective:     Vitals: Blood pressure 135/84, pulse 64, temperature 97 7 °F (36 5 °C), temperature source Oral, resp  rate 12, height 6' (1 829 m), weight 111 kg (244 lb), SpO2 100 %  , Body mass index is 33 09 kg/m² ,     Orthostatic Blood Pressures      Most Recent Value   Blood Pressure  135/84 filed at 01/05/2021 0700   Patient Position - Orthostatic VS  Lying filed at 01/05/2021 0700          Physical Exam  Vitals signs reviewed  Constitutional:       Appearance: He is well-developed  HENT:      Head: Normocephalic and atraumatic  Eyes:      Pupils: Pupils are equal, round, and reactive to light  Neck:      Musculoskeletal: Normal range of motion  Vascular: No JVD  Cardiovascular:      Rate and Rhythm: Normal rate and regular rhythm  Heart sounds: Normal heart sounds  No murmur  No friction rub  No gallop  Pulmonary:      Effort: Pulmonary effort is normal       Breath sounds: Normal breath sounds  No rales  Abdominal:      Palpations: Abdomen is soft  Skin:     General: Skin is warm and dry  Neurological:      Mental Status: He is alert and oriented to person, place, and time     Psychiatric:         Behavior: Behavior normal             Intake/Output Summary (Last 24 hours) at 1/5/2021 1121  Last data filed at 1/5/2021 0840  Gross per 24 hour   Intake 2500 ml   Output 1150 ml   Net 1350 ml       Weight (last 2 days)     None             Medications:      Current Facility-Administered Medications:     acetaminophen (TYLENOL) tablet 650 mg, 650 mg, Oral, Q6H PRN, Iqra Galvan MD    apixaban (ELIQUIS) tablet 5 mg, 5 mg, Oral, BID, Iqra Galvan MD, 5 mg at 01/05/21 0836    cephalexin (KEFLEX) capsule 500 mg, 500 mg, Oral, Q12H [address], Iqra Galvan MD, 500 mg at 01/05/21 0836    diphenhydrAMINE (BENADRYL) tablet 25 mg, 25 mg, Oral, HS PRN, Marcela S Charley, CRNP, 25 mg at 01/04/21 2307    metoprolol tartrate (LOPRESSOR) tablet 75 mg, 75 mg, Oral, Q12H [address], Crystal Maksimik, DO, 75 mg at 01/05/21 0836    ondansetron (ZOFRAN) injection 4 mg, 4 mg, Intravenous, Q6H PRN, Irqa Galvan MD    phenazopyridine (PYRIDIUM) tablet 100 mg, 100 mg, Oral, TID With Meals, Iqra Galvan MD, 100 mg at 01/05/21 0748     Labs & Results:    Results from last 7 days   Lab Units 01/02/21  1102   CK TOTAL U/L 72   TROPONIN I ng/mL <0 02     Results from last 7 days   Lab Units 01/03/21  0515 01/02/21  1102   WBC Thousand/uL 9 23 10 79*   HEMOGLOBIN g/dL 14 0 13 5   HEMATOCRIT % 41 6 39 2   PLATELETS Thousands/uL 213 197         Results from last 7 days   Lab Units 01/04/21  0412 01/03/21  0515 01/02/21  1102   POTASSIUM mmol/L 4 3 4 3 4 2   CHLORIDE mmol/L 100 101 102   CO2 mmol/L 27 25 25   BUN mg/dL 19 17 18   CREATININE mg/dL 1 18 0 97 1 00   CALCIUM mg/dL 8 9 8 9 8 3   ALK PHOS U/L  --  56 54   ALT U/L  --  39 42   AST U/L  --  16 24     Results from last 7 days   Lab Units 01/02/21  1144   INR  1 09   PTT seconds 32     Results from last 7 days   Lab Units 01/03/21  0515 01/02/21  1102   MAGNESIUM mg/dL 2 4 2 1                    Counseling / Coordination of Care  Total floor / unit time spent today 20 minutes  Greater than 50% of total time was spent with the patient and / or family counseling and / or coordination of care    A description of the counseling / coordination of care: Discussed with Dr Dulce Fisher

## 2021-01-05 NOTE — ASSESSMENT & PLAN NOTE
Suspected as witnessed during BONG cardioversion as well as nurse  Could be the cause of Afib/a flutter  Referral provided for sleep medicine

## 2021-01-05 NOTE — PLAN OF CARE
Pt's HR has remained NSR, SB with 1st degree AVB with occasional PAC's  Pt slept through shift without problems with eye mask and ear plugs  Pt with periods of apnea while sleeping  Pt has been encouraged to f/u with family MD for sleep apnea  Pt with no labs ordered; discussed with covering CRNP-she stated rounding MD can decide if he needs labs  Pt for possible d/c to home today  Pt tolerating protocols  Will continue to monitor pt and intervene as needed      Problem: PAIN - ADULT  Goal: Verbalizes/displays adequate comfort level or baseline comfort level  Description: Interventions:  - Encourage patient to monitor pain and request assistance  - Assess pain using appropriate pain scale  - Administer analgesics based on type and severity of pain and evaluate response  - Implement non-pharmacological measures as appropriate and evaluate response  - Consider cultural and social influences on pain and pain management  - Notify physician/advanced practitioner if interventions unsuccessful or patient reports new pain  Outcome: Progressing     Problem: INFECTION - ADULT  Goal: Absence or prevention of progression during hospitalization  Description: INTERVENTIONS:  - Assess and monitor for signs and symptoms of infection  - Monitor lab/diagnostic results  - Monitor all insertion sites, i e  indwelling lines, tubes, and drains  - Monitor endotracheal if appropriate and nasal secretions for changes in amount and color  - [place] appropriate cooling/warming therapies per order  - Administer medications as ordered  - Instruct and encourage patient and family to use good hand hygiene technique  - Identify and instruct in appropriate isolation precautions for identified infection/condition  Outcome: Progressing     Problem: SAFETY ADULT  Goal: Patient will remain free of falls  Description: INTERVENTIONS:  - Assess patient frequently for physical needs  -  Identify cognitive and physical deficits and behaviors that affect risk of falls    -  [place] fall precautions as indicated by assessment   - Educate patient/family on patient safety including physical limitations  - Instruct patient to call for assistance with activity based on assessment  - Modify environment to reduce risk of injury  - Consider OT/PT consult to assist with strengthening/mobility  Outcome: Progressing  Goal: Maintain or return to baseline ADL function  Description: INTERVENTIONS:  -  Assess patient's ability to carry out ADLs; assess patient's baseline for ADL function and identify physical deficits which impact ability to perform ADLs (bathing, care of mouth/teeth, toileting, grooming, dressing, etc )  - Assess/evaluate cause of self-care deficits   - Assess range of motion  - Assess patient's mobility; develop plan if impaired  - Assess patient's need for assistive devices and provide as appropriate  - Encourage maximum independence but intervene and supervise when necessary  - Involve family in performance of ADLs  - Assess for home care needs following discharge   - Consider OT consult to assist with ADL evaluation and planning for discharge  - Provide patient education as appropriate  Outcome: Progressing  Goal: Maintain or return mobility status to optimal level  Description: INTERVENTIONS:  - Assess patient's baseline mobility status (ambulation, transfers, stairs, etc )    - Identify cognitive and physical deficits and behaviors that affect mobility  - Identify mobility aids required to assist with transfers and/or ambulation (gait belt, sit-to-stand, lift, walker, cane, etc )  - [place] fall precautions as indicated by assessment  - Record patient progress and toleration of activity level on Mobility SBAR; progress patient to next Phase/Stage  - Instruct patient to call for assistance with activity based on assessment  - Consider rehabilitation consult to assist with strengthening/weightbearing, etc   Outcome: Progressing     Problem: DISCHARGE PLANNING  Goal: Discharge to home or other facility with appropriate resources  Description: INTERVENTIONS:  - Identify barriers to discharge w/patient and caregiver  - Arrange for needed discharge resources and transportation as appropriate  - Identify discharge learning needs (meds, wound care, etc )  - Arrange for interpretive services to assist at discharge as needed  - Refer to Case Management Department for coordinating discharge planning if the patient needs post-hospital services based on physician/advanced practitioner order or complex needs related to functional status, cognitive ability, or social support system  Outcome: Progressing     Problem: Knowledge Deficit  Goal: Patient/family/caregiver demonstrates understanding of disease process, treatment plan, medications, and discharge instructions  Description: Complete learning assessment and assess knowledge base    Interventions:  - Provide teaching at level of understanding  - Provide teaching via preferred learning methods  Outcome: Progressing     Problem: CARDIOVASCULAR - ADULT  Goal: Maintains optimal cardiac output and hemodynamic stability  Description: INTERVENTIONS:  - Monitor I/O, vital signs and rhythm  - Monitor for S/S and trends of decreased cardiac output  - Administer and titrate ordered vasoactive medications to optimize hemodynamic stability  - Assess quality of pulses, skin color and temperature  - Assess for signs of decreased coronary artery perfusion  - Instruct patient to report change in severity of symptoms  Outcome: Progressing  Goal: Absence of cardiac dysrhythmias or at baseline rhythm  Description: INTERVENTIONS:  - Continuous cardiac monitoring, vital signs, obtain 12 lead EKG if ordered  - Administer antiarrhythmic and heart rate control medications as ordered  - Monitor electrolytes and administer replacement therapy as ordered  Outcome: Progressing     Problem: Potential for Falls  Goal: Patient will remain free of falls  Description: INTERVENTIONS:  - Assess patient frequently for physical needs  -  Identify cognitive and physical deficits and behaviors that affect risk of falls    -  [place] fall precautions as indicated by assessment   - Educate patient/family on patient safety including physical limitations  - Instruct patient to call for assistance with activity based on assessment  - Modify environment to reduce risk of injury  - Consider OT/PT consult to assist with strengthening/mobility  Outcome: Progressing

## 2021-01-05 NOTE — ASSESSMENT & PLAN NOTE
New onset a flutter  Unclear when it started as patient is asymptomatic and it was discovered incidentally on urgent care visit  Heart rate 137 a flutter on admission  Receive several doses of IV Cardizem and then on Cardizem drip     initiated on metoprolol 75 mg BID  Eliquis 5 mg twice daily for anticoagulation  S/p BONG guided Cardioversion on 1/4/21  Cardiology consult appreciated  Patient will need follow-up with cardiology-refill provided to Dr Esparza Child  Patient also needs to be evaluated by sleep study for possible/suspected sleep apnea

## 2021-01-05 NOTE — NURSING NOTE
Pt awake, alert and oriented  VSS, denies any cp or sob  Hr remains regular rate and rhythm  Pt given d/c instructions, verbalized understanding  Iv site d/c'd intact  Given belongings and pt getting dressed, will assist to car when ready

## 2021-01-06 ENCOUNTER — TELEPHONE (OUTPATIENT)
Dept: CARDIOLOGY CLINIC | Facility: CLINIC | Age: 66
End: 2021-01-06

## 2021-01-06 NOTE — TELEPHONE ENCOUNTER
The pt called and stated that he wanted Dr Elizabeth Kelly  to know that he has tried to reach Dr Coronado President office for the sleep study but he has not had a return call, we will look into it for him

## 2021-01-07 LAB
ATRIAL RATE: 266 BPM
P AXIS: 263 DEGREES
QRS AXIS: -74 DEGREES
QRSD INTERVAL: 102 MS
QT INTERVAL: 280 MS
QTC INTERVAL: 416 MS
T WAVE AXIS: 50 DEGREES
VENTRICULAR RATE: 133 BPM

## 2021-01-07 PROCEDURE — 93010 ELECTROCARDIOGRAM REPORT: CPT | Performed by: INTERNAL MEDICINE

## 2021-01-08 NOTE — UTILIZATION REVIEW
Notification of Discharge  This is a Notification of Discharge from our facility [address]  Please be advised that this patient has been discharge from our facility  Below you will find the admission and discharge date and time including the patients disposition  PRESENTATION DATE: 1/2/2021 10:38 AM  OBS ADMISSION DATE:   IP ADMISSION DATE: 1/2/21 1258   DISCHARGE DATE: 1/5/2021 12:39 PM  DISPOSITION: Home/Self Care Home/Self Care   Admission Orders listed below:  Admission Orders (From admission, onward)     Ordered        01/02/21 1258  Inpatient Admission  Once                   Please contact the UR Department if additional information is required to close this patient's authorization/case  [address] [employer] Utilization Review Department  Main: [phone] x carefully listen to the prompts  All voicemails are confidential   [email or web address]  Send all requests for admission clinical reviews, approved or denied determinations and any other requests to dedicated fax number below belonging to the campus where the patient is receiving treatment   List of dedicated fax numbers:  [address] DENIALS (Administrative/Medical Necessity) [phone]   [address] (Maternity/NICU/Pediatrics) [phone]   Jody Bee [phone]   [facility] [phone]   [facility] [phone]   Mera Finger [address] [phone]   [facility]  [phone]   [address] [facility] [address] [facility]  [address] [address] [address] [facility] [phone]

## 2021-01-09 LAB
ATRIAL RATE: 248 BPM
ATRIAL RATE: 254 BPM
ATRIAL RATE: 79 BPM
P AXIS: -87 DEGREES
P AXIS: 252 DEGREES
P AXIS: 69 DEGREES
PR INTERVAL: 184 MS
QRS AXIS: -59 DEGREES
QRS AXIS: -61 DEGREES
QRS AXIS: -63 DEGREES
QRSD INTERVAL: 102 MS
QRSD INTERVAL: 102 MS
QRSD INTERVAL: 106 MS
QT INTERVAL: 246 MS
QT INTERVAL: 292 MS
QT INTERVAL: 402 MS
QTC INTERVAL: 357 MS
QTC INTERVAL: 419 MS
QTC INTERVAL: 460 MS
T WAVE AXIS: -16 DEGREES
T WAVE AXIS: 10 DEGREES
T WAVE AXIS: 7 DEGREES
VENTRICULAR RATE: 124 BPM
VENTRICULAR RATE: 127 BPM
VENTRICULAR RATE: 79 BPM

## 2021-01-09 PROCEDURE — 93010 ELECTROCARDIOGRAM REPORT: CPT | Performed by: INTERNAL MEDICINE

## 2021-01-12 ENCOUNTER — OFFICE VISIT (OUTPATIENT)
Dept: CARDIOLOGY CLINIC | Facility: CLINIC | Age: 66
End: 2021-01-12
Payer: COMMERCIAL

## 2021-01-12 VITALS
SYSTOLIC BLOOD PRESSURE: 148 MMHG | HEART RATE: 47 BPM | HEIGHT: 72 IN | OXYGEN SATURATION: 99 % | BODY MASS INDEX: 33.26 KG/M2 | TEMPERATURE: 97.8 F | RESPIRATION RATE: 16 BRPM | WEIGHT: 245.6 LBS | DIASTOLIC BLOOD PRESSURE: 86 MMHG

## 2021-01-12 DIAGNOSIS — I10 ESSENTIAL HYPERTENSION: ICD-10-CM

## 2021-01-12 DIAGNOSIS — R00.1 BRADYCARDIA: ICD-10-CM

## 2021-01-12 DIAGNOSIS — I48.92 PAROXYSMAL ATRIAL FLUTTER (HCC): Primary | ICD-10-CM

## 2021-01-12 DIAGNOSIS — E78.2 MIXED HYPERLIPIDEMIA: ICD-10-CM

## 2021-01-12 PROCEDURE — 99214 OFFICE O/P EST MOD 30 MIN: CPT | Performed by: INTERNAL MEDICINE

## 2021-01-12 RX ORDER — METOPROLOL TARTRATE 50 MG/1
50 TABLET, FILM COATED ORAL EVERY 12 HOURS SCHEDULED
Qty: 60 TABLET | Refills: 11 | Status: SHIPPED | OUTPATIENT
Start: 2021-01-12 | End: 2021-09-24 | Stop reason: SDUPTHER

## 2021-01-12 RX ORDER — MULTIVITAMIN WITH IRON
1 TABLET ORAL DAILY
Qty: 30 TABLET | Refills: 0 | Status: SHIPPED | OUTPATIENT
Start: 2021-01-12 | End: 2021-02-24 | Stop reason: SDUPTHER

## 2021-01-12 RX ORDER — LOSARTAN POTASSIUM 50 MG/1
50 TABLET ORAL DAILY
Qty: 30 TABLET | Refills: 11 | Status: SHIPPED | OUTPATIENT
Start: 2021-01-12 | End: 2021-01-28 | Stop reason: DRUGHIGH

## 2021-01-12 NOTE — PATIENT INSTRUCTIONS
EKG today shows a slow heart rate at 47 beats per minute  Continue Eliquis 5 mg twice daily  I would recommend decreasing metoprolol tartrate to 50 mg twice daily  I did send a new script to the pharmacy  Blood pressure is higher than I would like it to be  I would recommend adding losartan 50 mg daily which can be taken at the same time as the metoprolol  I would continue magnesium 250 mg daily  Will await results of sleep study which I have tried to expedite  Eventual stress test which we will discuss at follow-up

## 2021-01-12 NOTE — PROGRESS NOTES
Cardiology Office Visit    Omar Nolan  [id number]  1955    [facility]  [address]  [facility] [id number] [phone]      Dear Haydee Gowers, MD,    I had the pleasure of seeing your patient at our [address] Card[facility] office today 1/12/2021  As you know he is a pleasant male with a medical history as described below  Reason for office visit: Follow up atrial fibrillation, hypertension and hyperlipidemia  1  Paroxysmal atrial flutter [facility])  Assessment & Plan:  New onset atrial flutter with RVR discovered 1/2021 of unclear duration  Chads Vasc 2 on Eliquis 5 mg BID for anticoagulation  Patient is s/p BONG guided cardioversion 1/4/2021  BONG 1/4/2021 EF 55-60% without significant valvular abnormalities  Recommend 14 day ZIO monitor  Needs sleep study to rule out CADY  Recommend exercise stress test to rule out underlying ischemia  I have asked him to reduce his caffeine intake  Currently on metoprolol tartrate 75 mg BID with fatigue and bradycardia on ECG today (47 bpm)  Decrease metoprolol to 50 mg twice daily  Continue magnesium 250 mg daily  ECG 1/12/2021 shows sinus bradycardia at 47 bpm    Low threshold for EP evaluation and atrial flutter ablation  Orders:  -     Ambulatory referral to Cardiology  -     POCT ECG    2  Essential hypertension  Assessment & Plan:  Blood pressure is elevated on exam   I am decreasing metoprolol dose due to bradycardia which may further increase his blood pressure  I have recommended starting losartan 25 mg daily  Will up titrate for blood pressure optimization  3  Mixed hyperlipidemia  Assessment & Plan:  Lipid panel 7/28/2020: C 232  T 303  H 35  L 136  Would recommend dietary modification to start but patient likely would benefit from the addition of statin if cholesterol remains elevated  Will plan repeat lipid panel 7/2021         4  Bradycardia  Assessment & Plan:  Patient is bradycardic on exam today with heart rate of 47 bpm   I have recommended decreasing metoprolol tartrate 75 mg twice daily to 50 mg twice daily  14 day Zio has been ordered  HPI     Joshua Calvo has a past medical history of paroxysmal atrial fibrillation, hypertension and hyperlipidemia  Joshua Calvo presented to [address] [place] [address] on 1/2/2021 following an urgent care visit for urinary symptoms  He was diagnosed with a UTI, but also found to be in atrial flutter with RVR  HR was in the 130's upon presentation  He underwent BONG guided cardioversion on 1/4/2021 with return to normal sinus rhythm  He was discharged home on 1/5/2021 with outpatient cardiology follow up  He was discharged on Eliquis anticoagulation and metoprolol tartrate 75mg BID       1/12/2021:  Patient presents to the office today for follow-up  He denies any recurrent palpitations  He denies any chest pain  He denies any shortness of breath  He has returned to work  He does note some increased fatigue  He was seen by his PCP yesterday  No changes were made to his regimen at that time  ECG today shows sinus bradycardia at 47 bpm wiith nonspecific ST abnormality        Patient Active Problem List   Diagnosis    [facility] response [facility]    Acute cystitis without hematuria    Essential hypertension    Coagulation disorder ([facility] )     Past Medical History:   Diagnosis Date    [facility] flutter ([facility] )     Hypertension     Mixed hyperlipidemia      Social History     Socioeconomic History    Marital status: /Civil Union     Spouse name: Not on file    Number of children: Not on file    Years of education: Not on file    Highest education level: Not on file   Occupational History    Occupation: Retired     Occupation:      Comment: Works part time since retiring   Tobacco Use    Smoking status: Never Smoker    Smokeless tobacco: Never Used Vaping Use    Vaping Use: Never used   Substance and Sexual Activity    Alcohol use: Not Currently    Drug use: Never    Sexual activity: Not on file     Comment: Defer   Other Topics Concern    Not on file   Social History Narrative    Not on file     Social Determinants of Health     Financial Resource Strain: Not on file   Food Insecurity: Not on file   Transportation Needs: Not on file   Physical Activity: Not on file   Stress: Not on file   Social Connections: Not on file   Intimate Partner Violence: Not on file   Housing Stability: Not on file      Family History   Problem Relation Age of Onset    No Known Problems Mother     Hypertension Father      Past Surgical History:   Procedure Laterality Date    CARDIOVERSION  01/04/2021    TONSILLECTOMY  age 5   Tram Harris WISDOM TOOTH EXTRACTION         * Current medications reviewed  No Known Allergies    Cardiac Test Results:     ECG 01/12/2021:  Sinus bradycardia  47 bpm   Nonspecific ST abnormality  BONG 1/4/2021:  EF 55-60%  No regional wall motion abnormalities  Mild LVH  Left and right atrium mildly dilated  Mild spontaneous echo contrast without evidence of left atrial appendage thrombus  No ASD or PFO with bubble study  Trace to mild MR  Trace to mild TR  Lipid panel 7/28/2020: C 232  T 303  H 35  L 136  Review of Systems:    Review of Systems   Constitutional: Positive for fatigue  Negative for activity change and appetite change  HENT: Negative for congestion, hearing loss, tinnitus and trouble swallowing  Eyes: Negative for visual disturbance  Respiratory: Negative for cough, chest tightness, shortness of breath and wheezing  Cardiovascular: Negative for chest pain, palpitations and leg swelling  Gastrointestinal: Negative for abdominal distention, abdominal pain, nausea and vomiting  Genitourinary: Negative for difficulty urinating  Musculoskeletal: Negative for arthralgias  Skin: Negative for rash     Neurological: Negative for dizziness, syncope and light-headedness  Hematological: Does not bruise/bleed easily  Psychiatric/Behavioral: Negative for confusion  The patient is not nervous/anxious  All other systems reviewed and are negative  Vitals:    01/12/21 1252 01/12/21 1318   BP: 164/92 148/86   Pulse: (!) 47    Resp: 16    Temp: 97 8 °F (36 6 °C)    SpO2: 99%    Weight: 111 kg (245 lb 9 6 oz)    Height: 6' (1 829 m)      Vitals:    01/12/21 1252   Weight: 111 kg (245 lb 9 6 oz)     Height: 6' (182 9 cm)     Physical Exam  Constitutional:       Appearance: He is well-developed  HENT:      Head: Normocephalic and atraumatic  Eyes:      Conjunctiva/sclera: Conjunctivae normal       Pupils: Pupils are equal, round, and reactive to light  Neck:      Vascular: No JVD  Cardiovascular:      Rate and Rhythm: Regular rhythm  Bradycardia present  Heart sounds: Normal heart sounds  No murmur heard  No friction rub  No gallop  Pulmonary:      Effort: Pulmonary effort is normal       Breath sounds: Normal breath sounds  Abdominal:      General: Bowel sounds are normal       Palpations: Abdomen is soft  Musculoskeletal:      Cervical back: Normal range of motion  Skin:     General: Skin is warm and dry  Neurological:      Mental Status: He is alert and oriented to person, place, and time     Psychiatric:         Behavior: Behavior normal

## 2021-01-28 ENCOUNTER — CLINICAL SUPPORT (OUTPATIENT)
Dept: CARDIOLOGY CLINIC | Facility: CLINIC | Age: 66
End: 2021-01-28
Payer: COMMERCIAL

## 2021-01-28 DIAGNOSIS — I10 ESSENTIAL HYPERTENSION: Primary | ICD-10-CM

## 2021-01-28 PROCEDURE — 99211 OFF/OP EST MAY X REQ PHY/QHP: CPT

## 2021-01-28 RX ORDER — LOSARTAN POTASSIUM 100 MG/1
100 TABLET ORAL DAILY
Qty: 30 TABLET | Refills: 11 | Status: SHIPPED | OUTPATIENT
Start: 2021-01-28 | End: 2021-09-24 | Stop reason: SDUPTHER

## 2021-01-28 NOTE — PROGRESS NOTES
The pt is here today under the direction of Dr Emilia Mcbride for a BP check  I spoke with Dr Emilia Mcbride and made her aware of his VS  She recommended increasing the losartan to 100MG daily and to have a repeat bp check   The pt is not experiencing and edema at this time  I pend a new RX

## 2021-02-03 DIAGNOSIS — I48.92 ATRIAL FLUTTER (HCC): ICD-10-CM

## 2021-02-03 NOTE — TELEPHONE ENCOUNTER
Pt calling asking if he should be done with the eliquis when he runs out, or should he get a refill

## 2021-02-04 DIAGNOSIS — I48.92 ATRIAL FLUTTER (HCC): ICD-10-CM

## 2021-02-04 NOTE — TELEPHONE ENCOUNTER
Pt is calling again asking if he should continue the eliquis  States that he took his AM dose and is now out

## 2021-02-18 ENCOUNTER — TELEPHONE (OUTPATIENT)
Dept: CARDIOLOGY CLINIC | Facility: CLINIC | Age: 66
End: 2021-02-18

## 2021-02-18 NOTE — TELEPHONE ENCOUNTER
LMOM asking the pt to call back to re -wyatt his BP check  for today as the office will be closing early due to weather

## 2021-02-24 DIAGNOSIS — I48.92 ATRIAL FLUTTER WITH RAPID VENTRICULAR RESPONSE (HCC): ICD-10-CM

## 2021-02-24 RX ORDER — MULTIVITAMIN WITH IRON
1 TABLET ORAL DAILY
Qty: 90 TABLET | Refills: 3 | Status: SHIPPED | OUTPATIENT
Start: 2021-02-24 | End: 2021-03-04

## 2021-03-04 ENCOUNTER — OFFICE VISIT (OUTPATIENT)
Dept: SLEEP CENTER | Facility: CLINIC | Age: 66
End: 2021-03-04
Payer: COMMERCIAL

## 2021-03-04 VITALS
TEMPERATURE: 97.3 F | HEART RATE: 48 BPM | HEIGHT: 72 IN | OXYGEN SATURATION: 100 % | WEIGHT: 233 LBS | SYSTOLIC BLOOD PRESSURE: 130 MMHG | BODY MASS INDEX: 31.56 KG/M2 | DIASTOLIC BLOOD PRESSURE: 70 MMHG

## 2021-03-04 DIAGNOSIS — I48.92 ATRIAL FLUTTER WITH RAPID VENTRICULAR RESPONSE (HCC): ICD-10-CM

## 2021-03-04 DIAGNOSIS — R09.81 NASAL CONGESTION: ICD-10-CM

## 2021-03-04 DIAGNOSIS — E66.9 OBESITY (BMI 30-39.9): ICD-10-CM

## 2021-03-04 DIAGNOSIS — R40.0 DAYTIME SLEEPINESS: ICD-10-CM

## 2021-03-04 DIAGNOSIS — G47.9 SLEEP DISTURBANCE: ICD-10-CM

## 2021-03-04 DIAGNOSIS — I48.92 ATRIAL FLUTTER (HCC): ICD-10-CM

## 2021-03-04 DIAGNOSIS — G47.33 OBSTRUCTIVE SLEEP APNEA SYNDROME: Primary | ICD-10-CM

## 2021-03-04 DIAGNOSIS — I10 ESSENTIAL HYPERTENSION: ICD-10-CM

## 2021-03-04 PROCEDURE — 99204 OFFICE O/P NEW MOD 45 MIN: CPT | Performed by: INTERNAL MEDICINE

## 2021-03-04 RX ORDER — CARBOXYMETHYLCELLULOSE SODIUM 0.5 %
DROPPERETTE, SINGLE-USE DROP DISPENSER OPHTHALMIC (EYE)
COMMUNITY
Start: 2021-03-01 | End: 2021-05-27 | Stop reason: SDUPTHER

## 2021-03-04 NOTE — PATIENT INSTRUCTIONS
What is CADY? Obstructive sleep apnea is a common and serious sleep disorder that causes you to stop breathing during sleep  The airway repeatedly becomes blocked, limiting the amount of air that reaches your lungs  When this happens, you may snore loudly or making choking noises as you try to breathe  Your brain and body becomes oxygen deprived and you may wake up  This may happen a few times a night, or in more severe cases, several hundred times a night  Sleep apnea can make you wake up in the morning feeling tired or unrefreshed even though you have had a full night of sleep  During the day, you may feel fatigued, have difficulty concentrating or you may even unintentionally fall asleep  This is because your body is waking up numerous times throughout the night, even though you might not be conscious of each awakening  The lack of oxygen your body receives can have negative long-term consequences for your health  This includes:  High blood pressure  Heart disease  Irregular heart rhythms  Stroke  Pre-diabetes and diabetes  Depression    Testing  An objective evaluation of your sleep may be needed before your board certified sleep physician can make a diagnosis  Options include:   In-lab overnight sleep study  This type of sleep study requires you to stay overnight at a sleep center, in a bed that may resemble a hotel room  You will sleep with sensors hooked up to various parts of your body  These sensors record your brain waves, heartbeat, breathing and movement  An overnight sleep study also provides your doctor with the most complete information about your sleep  Learn more about an overnight sleep study      Home sleep apnea test  Some patients with high risk factors for obstructive sleep apnea and no other medical disorders may be candidates for a home sleep apnea test  The testing equipment differs in that it is less complicated than what is used in an overnight sleep study   As such, does not give all the data an in-lab will and if negative, may not mean you do not have the problem  Treatment for sleep apnea  includes using a continuous positive airway pressure (CPAP) machine to keep your airway open during sleep  A mask is placed over your nose and mouth, or just your nose  The mask is hooked to the CPAP machine that blows a gentle stream of air into the mask when you breathe  This helps keep your airway open so you can breathe more regularly  Extra oxygen may be given to you through the machine  You may be given a mouth device  It looks like a mouth guard or dental retainer and stops your tongue and mouth tissues from blocking your throat while you sleep  Surgery may be needed to remove extra tissues that block your mouth, throat, or nose  Manage sleep apnea:   Do not smoke  Nicotine and other chemicals in cigarettes and cigars can cause lung damage  Ask your healthcare provider for information if you currently smoke and need help to quit  E-cigarettes or smokeless tobacco still contain nicotine  Talk to your healthcare provider before you use these products  Do not drink alcohol or take sedative medicine before you go to sleep  Alcohol and sedatives can relax the muscles and tissues around your throat  This can block the airflow to your lungs  Maintain a healthy weight  Excess tissue around your throat may restrict your breathing  Ask your healthcare provider for information if you need to lose weight  Sleep on your side or use pillows designed to prevent sleep apnea  This prevents your tongue or other tissues from blocking your throat  You can also raise the head of your bed  Driving Safety  Refrain from driving when drowsy  Follow up with your healthcare provider as directed:  Write down your questions so you remember to ask them during your visits  Go to [employer] website for more information: Sleepeducation  org     What is CADY?    Obstructive sleep apnea is a common and serious sleep disorder that causes you to stop breathing during sleep  The airway repeatedly becomes blocked, limiting the amount of air that reaches your lungs  When this happens, you may snore loudly or making choking noises as you try to breathe  Your brain and body becomes oxygen deprived and you may wake up  This may happen a few times a night, or in more severe cases, several hundred times a night  Sleep apnea can make you wake up in the morning feeling tired or unrefreshed even though you have had a full night of sleep  During the day, you may feel fatigued, have difficulty concentrating or you may even unintentionally fall asleep  This is because your body is waking up numerous times throughout the night, even though you might not be conscious of each awakening  The lack of oxygen your body receives can have negative long-term consequences for your health  This includes:  High blood pressure  Heart disease  Irregular heart rhythms  Stroke  Pre-diabetes and diabetes  Depression    Testing  An objective evaluation of your sleep may be needed before your board certified sleep physician can make a diagnosis  Options include:   In-lab overnight sleep study  This type of sleep study requires you to stay overnight at a sleep center, in a bed that may resemble a hotel room  You will sleep with sensors hooked up to various parts of your body  These sensors record your brain waves, heartbeat, breathing and movement  An overnight sleep study also provides your doctor with the most complete information about your sleep  Learn more about an overnight sleep study      Home sleep apnea test  Some patients with high risk factors for obstructive sleep apnea and no other medical disorders may be candidates for a home sleep apnea test  The testing equipment differs in that it is less complicated than what is used in an overnight sleep study   As such, does not give all the data an in-lab will and if negative, may not mean you do not have the problem  Treatment for sleep apnea  includes using a continuous positive airway pressure (CPAP) machine to keep your airway open during sleep  A mask is placed over your nose and mouth, or just your nose  The mask is hooked to the CPAP machine that blows a gentle stream of air into the mask when you breathe  This helps keep your airway open so you can breathe more regularly  Extra oxygen may be given to you through the machine  You may be given a mouth device  It looks like a mouth guard or dental retainer and stops your tongue and mouth tissues from blocking your throat while you sleep  Surgery may be needed to remove extra tissues that block your mouth, throat, or nose  Manage sleep apnea:   Do not smoke  Nicotine and other chemicals in cigarettes and cigars can cause lung damage  Ask your healthcare provider for information if you currently smoke and need help to quit  E-cigarettes or smokeless tobacco still contain nicotine  Talk to your healthcare provider before you use these products  Do not drink alcohol or take sedative medicine before you go to sleep  Alcohol and sedatives can relax the muscles and tissues around your throat  This can block the airflow to your lungs  Maintain a healthy weight  Excess tissue around your throat may restrict your breathing  Ask your healthcare provider for information if you need to lose weight  Sleep on your side or use pillows designed to prevent sleep apnea  This prevents your tongue or other tissues from blocking your throat  You can also raise the head of your bed  Driving Safety  Refrain from driving when drowsy  Follow up with your healthcare provider as directed:  Write down your questions so you remember to ask them during your visits  Go to AAS website for more information: Sleepeducation  org     Nursing Support:  When: Monday through Friday 7A-5PM except holidays  Where: Our direct line is [phone]      If you are having a true emergency please call 911  In the event that the line is busy or it is after hours please leave a voice message and we will return your call  Please speak clearly, leaving your full name, birth date, best number to reach you and the reason for your call  Medication refills: We will need the name of the medication, the dosage, the ordering provider, whether you get a 30 or 90 day refill, and the pharmacy name and address  Medications will be ordered by the provider only  Nurses cannot call in prescriptions  Please allow 7 days for medication refills  Physician requested updates: If your provider requested that you call with an update after starting medication, please be ready to provide us the medication and dosage, what time you take your medication, the time you attempt to fall asleep, time you fall asleep, when you wake up, and what time you get out of bed  Sleep Study Results: We will contact you with sleep study results and/or next steps after the physician has reviewed your testing

## 2021-03-04 NOTE — PROGRESS NOTES
Review of Systems      Genitourinary need to urinate more than twice a night   Cardiology none   Gastrointestinal none   Neurology none   Constitutional none   Integumentary none   Psychiatry anxiety   Musculoskeletal none   Pulmonary snoring and difficulty breathing when lying flat    ENT none   Endocrine frequent urination   Hematological none

## 2021-03-18 ENCOUNTER — OFFICE VISIT (OUTPATIENT)
Dept: CARDIOLOGY CLINIC | Facility: CLINIC | Age: 66
End: 2021-03-18
Payer: COMMERCIAL

## 2021-03-18 VITALS
TEMPERATURE: 97.5 F | DIASTOLIC BLOOD PRESSURE: 88 MMHG | BODY MASS INDEX: 32.32 KG/M2 | OXYGEN SATURATION: 99 % | HEART RATE: 64 BPM | WEIGHT: 238.6 LBS | SYSTOLIC BLOOD PRESSURE: 168 MMHG | HEIGHT: 72 IN

## 2021-03-18 DIAGNOSIS — I10 ESSENTIAL HYPERTENSION: ICD-10-CM

## 2021-03-18 DIAGNOSIS — R00.1 BRADYCARDIA: ICD-10-CM

## 2021-03-18 DIAGNOSIS — I48.92 PAROXYSMAL ATRIAL FLUTTER (HCC): Primary | ICD-10-CM

## 2021-03-18 DIAGNOSIS — E78.2 MIXED HYPERLIPIDEMIA: ICD-10-CM

## 2021-03-18 PROCEDURE — 99214 OFFICE O/P EST MOD 30 MIN: CPT | Performed by: INTERNAL MEDICINE

## 2021-03-18 RX ORDER — AMLODIPINE BESYLATE 2.5 MG/1
2.5 TABLET ORAL DAILY
Qty: 30 TABLET | Refills: 11 | Status: SHIPPED | OUTPATIENT
Start: 2021-03-18 | End: 2021-03-25

## 2021-03-18 NOTE — PATIENT INSTRUCTIONS
Continue current medications and add amlodipine 2 5 mg daily  Blood pressure check in 1 week  Will await results of sleep study 4/8     Exercise stress test

## 2021-03-25 ENCOUNTER — CLINICAL SUPPORT (OUTPATIENT)
Dept: CARDIOLOGY CLINIC | Facility: CLINIC | Age: 66
End: 2021-03-25
Payer: COMMERCIAL

## 2021-03-25 VITALS
HEART RATE: 55 BPM | OXYGEN SATURATION: 93 % | WEIGHT: 235 LBS | BODY MASS INDEX: 31.83 KG/M2 | SYSTOLIC BLOOD PRESSURE: 150 MMHG | DIASTOLIC BLOOD PRESSURE: 82 MMHG | TEMPERATURE: 96.2 F | HEIGHT: 72 IN

## 2021-03-25 DIAGNOSIS — I10 ESSENTIAL HYPERTENSION: Primary | ICD-10-CM

## 2021-03-25 PROCEDURE — 99211 OFF/OP EST MAY X REQ PHY/QHP: CPT

## 2021-03-25 NOTE — PROGRESS NOTES
The patient is here today under the direction of Dr Mamadou Joya for  BP check  Dr Mamadou Joya was notified of the readings  She would like the pt to increase his amlpdipine to 5MG daily  He was instructed to double up the 2  5MGs until he receives a RX for the 5mg  A bp check was scheduled for in two weeks and the pt was instructed to call the office if he experiences any new or worse symptoms

## 2021-03-26 RX ORDER — AMLODIPINE BESYLATE 5 MG/1
5 TABLET ORAL DAILY
Qty: 30 TABLET | Refills: 11 | Status: SHIPPED | OUTPATIENT
Start: 2021-03-26 | End: 2021-09-24 | Stop reason: SDUPTHER

## 2021-04-08 ENCOUNTER — HOSPITAL ENCOUNTER (OUTPATIENT)
Dept: SLEEP CENTER | Facility: CLINIC | Age: 66
Discharge: HOME | End: 2021-04-08
Payer: COMMERCIAL

## 2021-04-08 DIAGNOSIS — G47.33 OBSTRUCTIVE SLEEP APNEA SYNDROME: ICD-10-CM

## 2021-04-08 PROCEDURE — 95810 POLYSOM 6/> YRS 4/> PARAM: CPT

## 2021-04-09 ENCOUNTER — CLINICAL SUPPORT (OUTPATIENT)
Dept: CARDIOLOGY CLINIC | Facility: CLINIC | Age: 66
End: 2021-04-09
Payer: COMMERCIAL

## 2021-04-09 VITALS
DIASTOLIC BLOOD PRESSURE: 78 MMHG | HEART RATE: 55 BPM | BODY MASS INDEX: 31.29 KG/M2 | WEIGHT: 231 LBS | SYSTOLIC BLOOD PRESSURE: 140 MMHG | TEMPERATURE: 97.4 F | HEIGHT: 72 IN

## 2021-04-09 DIAGNOSIS — G47.9 SLEEP DISTURBANCE: ICD-10-CM

## 2021-04-09 DIAGNOSIS — I10 HYPERTENSION, UNSPECIFIED TYPE: Primary | ICD-10-CM

## 2021-04-09 DIAGNOSIS — G47.33 OBSTRUCTIVE SLEEP APNEA SYNDROME: Primary | ICD-10-CM

## 2021-04-09 PROCEDURE — 99211 OFF/OP EST MAY X REQ PHY/QHP: CPT

## 2021-04-09 RX ORDER — ZOLPIDEM TARTRATE 5 MG/1
5 TABLET ORAL AS NEEDED
Qty: 1 TABLET | Refills: 0 | Status: SHIPPED | OUTPATIENT
Start: 2021-04-09 | End: 2021-09-23

## 2021-04-09 NOTE — PROGRESS NOTES
The pt is here today under the direction of Dr Emilia Mcbride for a bp check  Vitals were taken and doctor Emilia Mcbride was notified  As per Dr David Lomax the pt was directed to continue his medications as currently prescribed and to call us with any new/ worsen symptoms

## 2021-04-09 NOTE — PROGRESS NOTES
Sleep Study Documentation    Pre-Sleep Study       Sleep testing procedure explained to patient:YES    Patient napped prior to study:NO    Caffeine:Dayshift worker after 12PM   Caffeine use:NO    Alcohol:Dayshift workers after 5PM: Alcohol use:NO    Typical day for patient:YES       Study Documentation    Sleep Study Indications: snoring and daytime sleepiness    Sleep Study: Diagnostic   Snore:Severe  Supplemental O2: no    Minimum SaO2 87  Baseline SaO2 95          EKG abnormalities: unremarkable - bradycardia noted    EEG abnormalities: no    Sleep Study Recorded < 2 hours: N/A    Sleep Study Recorded > 2 hours but incomplete study: N/A    Sleep Study Recorded 6 hours but no sleep obtained: NO    Patient classification: employed       Post-Sleep Study    Medication used at bedtime or during sleep study:NO    Patient reports time it took to fall asleep:greater than 60 minutes    Patient reports waking up during study:3 or more times  Patient reports returning to sleep without difficulty  Patient reports sleeping 2 to 4 hours with dreaming  Patient reports sleep during study:worse than usual    Patient rated sleepiness: Somewhat sleepy or tired    PAP treatment:no

## 2021-04-15 ENCOUNTER — TELEPHONE (OUTPATIENT)
Dept: SLEEP CENTER | Facility: CLINIC | Age: 66
End: 2021-04-15

## 2021-04-15 DIAGNOSIS — Z20.822 ENCOUNTER FOR PREPROCEDURE SCREENING LABORATORY TESTING FOR COVID-19: Primary | ICD-10-CM

## 2021-04-15 DIAGNOSIS — Z01.812 ENCOUNTER FOR PREPROCEDURE SCREENING LABORATORY TESTING FOR COVID-19: Primary | ICD-10-CM

## 2021-04-15 NOTE — TELEPHONE ENCOUNTER
I spoke with patient, advised sleep study results  Dr Saintclair Malling recommends titration study with sleep aid  Scheduled titration study for 6/15/2021 in [facility], placed on wait list  Instructions provided, patient verbalizes understanding  Patient aware Ambien ordered and he will take with to sleep study  Discussed COVID protocol:  Patient agreeable to have COVID test on 6/7/2021, for PAP study on 6/15/2021  We ask that you limit interpersonal interactions as much as possible and observe all social distancing and masking precautions from the time of your testing to the time of your study  COVID letter sent  COVID testing order placed

## 2021-04-15 NOTE — TELEPHONE ENCOUNTER
----- Message from Eduardo Montelongo MD sent at 4/9/2021 12:53 PM EDT -----  I put in a prescription for Ambien to be used on the study night

## 2021-04-16 ENCOUNTER — TELEPHONE (OUTPATIENT)
Dept: SLEEP CENTER | Facility: CLINIC | Age: 66
End: 2021-04-16

## 2021-04-23 ENCOUNTER — HOSPITAL ENCOUNTER (OUTPATIENT)
Dept: NON INVASIVE DIAGNOSTICS | Facility: HOSPITAL | Age: 66
Discharge: HOME | End: 2021-04-23
Attending: INTERNAL MEDICINE
Payer: COMMERCIAL

## 2021-04-23 DIAGNOSIS — I10 ESSENTIAL HYPERTENSION: ICD-10-CM

## 2021-04-23 PROCEDURE — 93017 CV STRESS TEST TRACING ONLY: CPT

## 2021-04-29 ENCOUNTER — TELEPHONE (OUTPATIENT)
Dept: CARDIOLOGY CLINIC | Facility: CLINIC | Age: 66
End: 2021-04-29

## 2021-04-29 NOTE — TELEPHONE ENCOUNTER
Pt is aware  States that he would be okay with the ZIO  He will call Monday to set up an appointment here next week

## 2021-04-29 NOTE — TELEPHONE ENCOUNTER
----- Message from [facility], DO sent at 4/28/2021  9:37 PM EDT -----  Can you please call the patient let him know that his stress test did not suggest any significant blockages in the heart and that he did very well on the treadmill but that he did have frequent premature atrial complexes/early heartbeats with the top chamber of the heart as well as short runs of atrial tachycardia and occasional premature ventricular complexes/early heartbeats from the bottom chamber of the heart  I would recommend 14 day ZIO monitor to assess heart rhythm in more detail  If he is agreeable can you please pend order for me  Thank you

## 2021-05-06 ENCOUNTER — CLINICAL SUPPORT (OUTPATIENT)
Dept: CARDIOLOGY CLINIC | Facility: CLINIC | Age: 66
End: 2021-05-06
Payer: COMMERCIAL

## 2021-05-06 DIAGNOSIS — I49.3 PVC (PREMATURE VENTRICULAR CONTRACTION): Primary | ICD-10-CM

## 2021-05-06 DIAGNOSIS — I47.1 ATRIAL TACHYCARDIA (HCC): ICD-10-CM

## 2021-05-06 PROCEDURE — 93246 EXT ECG>7D<15D RECORDING: CPT | Performed by: INTERNAL MEDICINE

## 2021-05-06 PROCEDURE — 99211 OFF/OP EST MAY X REQ PHY/QHP: CPT

## 2021-05-06 NOTE — PROGRESS NOTES
The patient is here today under the direction of Dr Mary Maddox for a 14 day ZIO monitor to assess heart rhythm due to frequent premature atrial complexes as well as short runs of atrial tachycardia and occasional premature ventricular complexes  Zio patch was placed on the patientas left chest  Instructions were verbally given and verbally understood by the patient

## 2021-05-21 VITALS
HEART RATE: 50 BPM | DIASTOLIC BLOOD PRESSURE: 100 MMHG | SYSTOLIC BLOOD PRESSURE: 158 MMHG | HEIGHT: 72 IN | BODY MASS INDEX: 32.91 KG/M2 | WEIGHT: 243 LBS | OXYGEN SATURATION: 98 %

## 2021-05-27 DIAGNOSIS — I48.92 PAROXYSMAL ATRIAL FLUTTER (HCC): Primary | ICD-10-CM

## 2021-05-27 RX ORDER — CARBOXYMETHYLCELLULOSE SODIUM 0.5 %
250 DROPPERETTE, SINGLE-USE DROP DISPENSER OPHTHALMIC (EYE) DAILY
Qty: 30 TABLET | Refills: 11 | Status: SHIPPED | OUTPATIENT
Start: 2021-05-27 | End: 2022-05-23 | Stop reason: SDUPTHER

## 2021-06-02 NOTE — TELEPHONE ENCOUNTER
New COVID protocol letter sent, will need COVID testing 5 days prior to sleep study if not fully vaccinated  If vaccinated outside of [facility] will need to email copy of COVID testing card to Cover

## 2021-06-10 ENCOUNTER — CLINICAL SUPPORT (OUTPATIENT)
Dept: CARDIOLOGY CLINIC | Facility: CLINIC | Age: 66
End: 2021-06-10
Payer: COMMERCIAL

## 2021-06-10 DIAGNOSIS — I49.3 PVC (PREMATURE VENTRICULAR CONTRACTION): ICD-10-CM

## 2021-06-10 DIAGNOSIS — I48.92 PAROXYSMAL ATRIAL FLUTTER (HCC): ICD-10-CM

## 2021-06-10 DIAGNOSIS — I47.1 ATRIAL TACHYCARDIA (HCC): ICD-10-CM

## 2021-06-10 PROCEDURE — 93248 EXT ECG>7D<15D REV&INTERPJ: CPT | Performed by: INTERNAL MEDICINE

## 2021-06-18 ENCOUNTER — OFFICE VISIT (OUTPATIENT)
Dept: CARDIOLOGY CLINIC | Facility: CLINIC | Age: 66
End: 2021-06-18
Payer: COMMERCIAL

## 2021-06-18 VITALS
OXYGEN SATURATION: 98 % | SYSTOLIC BLOOD PRESSURE: 160 MMHG | HEIGHT: 72 IN | BODY MASS INDEX: 31.42 KG/M2 | TEMPERATURE: 96.3 F | HEART RATE: 46 BPM | WEIGHT: 232 LBS | DIASTOLIC BLOOD PRESSURE: 90 MMHG

## 2021-06-18 DIAGNOSIS — G47.33 OSA (OBSTRUCTIVE SLEEP APNEA): ICD-10-CM

## 2021-06-18 DIAGNOSIS — I10 ESSENTIAL HYPERTENSION: ICD-10-CM

## 2021-06-18 DIAGNOSIS — E78.2 MIXED HYPERLIPIDEMIA: ICD-10-CM

## 2021-06-18 DIAGNOSIS — R00.1 BRADYCARDIA: ICD-10-CM

## 2021-06-18 DIAGNOSIS — I48.92 PAROXYSMAL ATRIAL FLUTTER (HCC): Primary | ICD-10-CM

## 2021-06-18 PROCEDURE — 99214 OFFICE O/P EST MOD 30 MIN: CPT | Performed by: INTERNAL MEDICINE

## 2021-06-18 NOTE — PATIENT INSTRUCTIONS
Continue current medications  ZIO monitor showed short episodes of SVT  I would recommend repeat monitor once on CPAP  I would recommend blood pressure check in 1 week  Please bring blood pressure cuff to the office so we can check calibration

## 2021-07-02 ENCOUNTER — CLINICAL SUPPORT (OUTPATIENT)
Dept: CARDIOLOGY CLINIC | Facility: CLINIC | Age: 66
End: 2021-07-02

## 2021-07-02 VITALS
DIASTOLIC BLOOD PRESSURE: 88 MMHG | WEIGHT: 228.2 LBS | SYSTOLIC BLOOD PRESSURE: 167 MMHG | HEIGHT: 72 IN | OXYGEN SATURATION: 98 % | BODY MASS INDEX: 30.91 KG/M2 | HEART RATE: 51 BPM

## 2021-07-02 DIAGNOSIS — I10 ESSENTIAL HYPERTENSION: Primary | ICD-10-CM

## 2021-07-07 ENCOUNTER — TELEPHONE (OUTPATIENT)
Dept: SLEEP CENTER | Facility: CLINIC | Age: 66
End: 2021-07-07

## 2021-07-09 NOTE — TELEPHONE ENCOUNTER
APAP ordered    Spoke with patient, advised that APAP was ordered  APAP explained  Patient wishing to use [facility]   All information faxed, advised they will contact him to schedule appointment      Compliance follow up scheduled 9/23/2021    Patient will call with questions or concerns

## 2021-09-22 ENCOUNTER — TELEPHONE (OUTPATIENT)
Dept: SLEEP CENTER | Facility: CLINIC | Age: 66
End: 2021-09-22

## 2021-09-23 ENCOUNTER — OFFICE VISIT (OUTPATIENT)
Dept: SLEEP CENTER | Facility: CLINIC | Age: 66
End: 2021-09-23
Payer: COMMERCIAL

## 2021-09-23 VITALS
BODY MASS INDEX: 31.69 KG/M2 | OXYGEN SATURATION: 91 % | DIASTOLIC BLOOD PRESSURE: 70 MMHG | HEART RATE: 62 BPM | HEIGHT: 72 IN | WEIGHT: 234 LBS | SYSTOLIC BLOOD PRESSURE: 140 MMHG | TEMPERATURE: 97.6 F

## 2021-09-23 DIAGNOSIS — Z78.9 DIFFICULTY USING CONTINUOUS POSITIVE AIRWAY PRESSURE (CPAP) DEVICE: ICD-10-CM

## 2021-09-23 DIAGNOSIS — I48.92 PAROXYSMAL ATRIAL FLUTTER (HCC): ICD-10-CM

## 2021-09-23 DIAGNOSIS — G47.9 SLEEP DISTURBANCE: ICD-10-CM

## 2021-09-23 DIAGNOSIS — R40.0 DAYTIME SLEEPINESS: ICD-10-CM

## 2021-09-23 DIAGNOSIS — G47.33 OSA (OBSTRUCTIVE SLEEP APNEA): Primary | ICD-10-CM

## 2021-09-23 DIAGNOSIS — I10 ESSENTIAL HYPERTENSION: ICD-10-CM

## 2021-09-23 DIAGNOSIS — E66.9 OBESITY (BMI 30-39.9): ICD-10-CM

## 2021-09-23 DIAGNOSIS — R09.81 NASAL CONGESTION: ICD-10-CM

## 2021-09-23 PROCEDURE — 99214 OFFICE O/P EST MOD 30 MIN: CPT | Performed by: INTERNAL MEDICINE

## 2021-09-23 NOTE — PROGRESS NOTES
Follow-Up Note - Sleep Center   Mimi Browning  78 y o  male  LDR:[birth year]  IES:[id number]  RJV:5/37/6372    CC: I saw this patient for follow-up in clinic today for Sleep disordered breathing, Coexisting Sleep and Medical Problems  He got a ResMed machine in August of this year  Patient recently had a diagnostic sleep study and is here to review results / treatment options  The study demonstrated severe obstructive sleep apnea: AHI 40 /hour , worse while supine  Loud intensity  snoring  was noted there were also 5 respiratory effort-related arousals     Minimum oxygen saturation 87 %  and 2 5 minutes of total sleep time was spent with saturations less than 90%  PFSH, Problem List, Medications & Allergies were reviewed in EMR  Interval changes: none reported  He  has a past medical history of Hypertension  He has a current medication list which includes the following prescription(s): amlodipine, apixaban, [facility] magnesium oxide, losartan, and metoprolol tartrate  PHYSIOLOGICAL DATA REVIEW AND INTERPRETATION:  He uses [employer] supplies and No data was available at the time of his visit, but he reports regular use of the device for > 4 hours/night; Patient has not been using ozone based or other devices to sanitize the machine  We were able to obtain data after he left  Data shows use for more than 4 hours 97% of the time  The respiratory event index is 7 9 per hour at 95th percentile pressure of 14 9 cm H2O  Likely related to Mask leaks  Just under 50% of the events were centrals  SUBJECTIVE: Regarding use of PAP, Samuel Mason reports:   · He is experiencing some adverse effects: mask leaks  and pressure too high; has not noticed any fibres or foreign material in air line  · He is benefiting from use: sleeping better   Sleep Routine: Samuel Mason reports getting 6-7 hrs sleep  ; he has no difficulty initiating, but reports diffculty maintaining sleep     He has interruptions of sleep because of mask leaks or nocturia  He arises with aid of an alarm and feels more refreshed since on Rx  Javad Weber reports significantly improved  Excessive Daytime Sleepiness, or napping  He rated himself at Total score: 4 /24 on the [place] Sleepiness Scale  Habits: reports that he has never smoked  He has never used smokeless tobacco ,  reports current alcohol use ,  reports no history of drug use , Caffeine use: limited , Exercise routine: regular    ROS: reviewed & as attached  Significant for weight has been stable  He reported no nasal or respiratory symptoms  He has not experienced any further episodes of atrial fibrillation  Laura Sinks EXAM: /70 (BP Location: Left arm, Cuff Size: Large)   Pulse 62   Temp 97 6 °F (36 4 °C) (Temporal)   Ht 6' (1 829 m)   Wt 106 kg (234 lb)   SpO2 91%   BMI 31 74 kg/m²     Patient is well groomed; well appearing  H&N: EOMI; NC/AT:no facial pressure marks, no rashes  Skin/Extrem: col & hydration normal; no edema  Psych: cooperativeand in no distress  Mental state:appears normal   Resp: Respiratory effort is normal  CNS: Alert, orientated, clear & coherent speech  Physical findings otherwise essentially unchanged from previous  IMPRESSION: Problem List Items & Comorbidities Addressed this Visit    1  CADY (obstructive sleep apnea)  PAP DME Resupply/Reorder    PAP DME Pressure Change   2  Difficulty using continuous positive airway pressure (CPAP) device     3  Sleep disturbance     4  Daytime sleepiness     5  Nasal congestion     6  Essential hypertension     7  Paroxysmal atrial flutter (HCC)     8  Obesity (BMI 30-39  9)         PLAN:  1  I reviewed results of prior studies and physiologic data with the patient  I discussed treatment options with risks and benefits  2  Treatment with  PAP is medically necessary and Javad Weber is agreable to continue use     3  Care of equipment, methods to improve comfort using PAP and importance of compliance with therapy were discussed  4  Pressure setting: change 10-17 cmH2O     5  Rx provided to replace  supplies and Care coordinated with DME provider for refitting of the interface  6  Discussed strategies for weight reduction  7  Follow-up is advised in 1 year or sooner if needed to monitor progress, compliance and to adjust therapy  Thank you for allowing me to participate in the care of this patient  Sincerely,    Authenticated electronically by Calin Burk MD on 12/79/66   Board Certified Specialist     Portions of the record may have been created with voice recognition software  Occasional wrong word or "sound a like" substitutions may have occurred due to the inherent limitations of voice recognition software  There may also be notations and random deletions of words or characters from malfunctioning software  Read the chart carefully and recognize, using context, where substitutions/deletions have occurred

## 2021-09-24 ENCOUNTER — TELEPHONE (OUTPATIENT)
Dept: SLEEP CENTER | Facility: CLINIC | Age: 66
End: 2021-09-24

## 2021-09-24 ENCOUNTER — OFFICE VISIT (OUTPATIENT)
Dept: CARDIOLOGY CLINIC | Facility: CLINIC | Age: 66
End: 2021-09-24
Payer: COMMERCIAL

## 2021-09-24 VITALS
BODY MASS INDEX: 31.97 KG/M2 | DIASTOLIC BLOOD PRESSURE: 86 MMHG | HEIGHT: 72 IN | SYSTOLIC BLOOD PRESSURE: 148 MMHG | HEART RATE: 49 BPM | WEIGHT: 236 LBS | OXYGEN SATURATION: 99 %

## 2021-09-24 DIAGNOSIS — I48.92 PAROXYSMAL ATRIAL FLUTTER (HCC): Primary | ICD-10-CM

## 2021-09-24 DIAGNOSIS — E78.2 MIXED HYPERLIPIDEMIA: ICD-10-CM

## 2021-09-24 DIAGNOSIS — G47.33 OSA (OBSTRUCTIVE SLEEP APNEA): ICD-10-CM

## 2021-09-24 DIAGNOSIS — I10 ESSENTIAL HYPERTENSION: ICD-10-CM

## 2021-09-24 DIAGNOSIS — R00.1 BRADYCARDIA: ICD-10-CM

## 2021-09-24 PROCEDURE — 99214 OFFICE O/P EST MOD 30 MIN: CPT | Performed by: INTERNAL MEDICINE

## 2021-09-24 RX ORDER — METOPROLOL TARTRATE 50 MG/1
50 TABLET, FILM COATED ORAL EVERY 12 HOURS SCHEDULED
Qty: 180 TABLET | Refills: 3 | Status: SHIPPED | OUTPATIENT
Start: 2021-09-24 | End: 2022-03-09 | Stop reason: SDUPTHER

## 2021-09-24 RX ORDER — AMLODIPINE BESYLATE 5 MG/1
5 TABLET ORAL DAILY
Qty: 90 TABLET | Refills: 3 | Status: SHIPPED | OUTPATIENT
Start: 2021-09-24

## 2021-09-24 RX ORDER — LOSARTAN POTASSIUM 100 MG/1
100 TABLET ORAL DAILY
Qty: 90 TABLET | Refills: 3 | Status: SHIPPED | OUTPATIENT
Start: 2021-09-24

## 2021-09-24 NOTE — TELEPHONE ENCOUNTER
Rx for CPAP resupply, Rx for pressure change and office notes faxed to [address] Patient

## 2021-09-24 NOTE — PATIENT INSTRUCTIONS
Continue current medications  2 month follow up with Rudy Salvador  If blood pressure remains elevated we will add HCTZ 25 mg daily  Goal weight loss 10 pounds in next 2 months  Call me with any change in symptoms  Repeat ANITRA at follow up now that you are on CPAP

## 2021-09-30 NOTE — PROGRESS NOTES
Consultation - [address] [place]  76 y o  male  Rob Hernandez  UWP:[id number]    Physician Requesting Consult: Erna Roger MD     Reason for Consult : At your kind request I saw Joshua Howell for initial sleep evaluation today  The patient is here to evaluate for suspected Obstructive Sleep Apnea  PFSH, Problem List, Medications & Allergies were reviewed in EMR  Cindy Mckay  has a past medical history of Hypertension  He has a current medication list which includes the following prescription(s): apixaban, [facility] magnesium oxide, losartan, metoprolol tartrate, and phenazopyridine  HPI:  He had a bout of atrial flutter in January of this year and has cardioverted  However, he also has a longstanding history of loud snoring that disturbs others  Bed partner has witnessed apneas  Is tried breathe right strips with some benefit  Other Complaints:  Frequent interruptions of sleep  Restless Leg Syndrome: reports no suggestive symptoms    Parasomnia: dentist reports teeth grinding during sleep, but no other features of parasomnia   Sleep Routine (averages): Typical Bedtime:  9:30 p m  Gets OOB:  5:15 a m  TIB:7 5 hrs  Sleep latency: upto 60 minutes Sleep Interruptions:3-4/nite and is usually able to fall back asleep  Awakens: with aid of an alarm  Upon awakening: usually feels refreshed  He estimates getting 6 5 hrs sleep  Cindy Mckay reports Excessive Daytime Sleepiness feels drowsy in the afternoons and naps for up to 45 minutes he rated himself at Total score: 6 /24 on the Beaver Sleepiness Scale  Habits:  reports that he has never smoked  He has never used smokeless tobacco  ;   E-Cigarette/Vaping    E-Cigarette Use Never User     ;  reports no history of drug use ;  reports current alcohol use  ; Caffeine use:limited ; Exercise routine: regular    Family History:  Father was no to snore  ROS - see attached   Significant for intentional weight loss of around 10 lb in the past few weeks  Nasal congestion for which she uses saline spray      EXAM:  /70 (BP Location: Left arm, Cuff Size: Large)   Pulse (!) 48   Temp (!) 97 3 °F (36 3 °C) (Temporal)   Ht 6' (1 829 m)   Wt 106 kg (233 lb)   SpO2 100%   BMI 31 60 kg/m²    General: Well groomed male, well appearing, in no apparent distress  Neurological: Alert and orientated;  cooperative; Cranial nerves intact;    Psychiatric: Speech:clear and coherent; Normal mood, affect & thought   Skin: warm and dry; Color& Hydration good; no facial rashes or lesions   HEENT:  Craniofacial anatomy: normal Sinuses: non- tender  TMJ: Normal    Eyes: EOM's intact;  conjunctiva/corneas clear   Ears: Externallyappear normal     Nasal Airway: is patent and assymetric nares Septum:  Deviated; Mucosa: normal; Turbinates: normal; Rhinorrhea: None   Mouth: Lips: normal posture; Dentition: normal   Mucosa:moist  ; Hard Palate:narrow   Oropharryx: crowded and AP narrowing Tongue: Mallampati:Class IV and MobileSoft Palate:  redundant  Tonsils: cryptic  Neck:is thick; Neck Circumference: 17 5 "; Supple; no abnormal masses; Thyroid:normal  Trachea:central     Lymph: No Cervical or Submandibular Lymhadenopathy  Heart: S1,S2 normal; RRR; no gallop; nomurmurs   Lungs: Respiratory Effort:normal  Air entry good bilaterally  No wheezes  No rales  Abdomen: Obese, Soft & non-tender    Extremities: No pedal edema  No clubbing or cyanosis  Musculoskeletal:  Motor normal; Gait:normal        IMPRESSION: Primary, Secondary (to Medical or Psych conditions) Diagnoses & Comorbidities   1  Obstructive sleep apnea syndrome  Ambulatory referral to Sleep Medicine    Diagnostic Sleep Study   2  Sleep disturbance     3  Daytime sleepiness     4  Nasal congestion     5  Essential hypertension     6  Atrial flutter [facility])  Ambulatory referral to Sleep Medicine   7  Atrial flutter with rapid ventricular response [facility])  Ambulatory referral to Sleep Medicine   8  Obesity (BMI 30-39  9)          PLAN:  With respect to above conditions, comprehensive counseling provided on pathophysiology; effects on symptoms and comorbidities, diagnostic strategies & limitations; treatment options; risks or no treament; risks & benefits of the various therapeutic options; costs and insurance aspects  I advised weight reduction, avoiding sleeping supine, using alcohol or sedating medications close to bed time and on safe driving practices  Nocturnal polysomnography is indicated and a diagnostic study will be scheduled  He declined home sleep testing  Home sleep testing is contraindicated because Severe insomnia  Patient is willing to try Positive airway pressure therapy and will be scheduled for a titration study if indicated  Cognitive behavioral therapy was initiated, Sleep Hygiene and behavioral techniques to manage Insomnia were discussed  Nasal symptoms may improve with regular nasal saline rinse followed by topical nasal steroid if necessary  Follow-up to be scheduled after the studies to review results, further details of treatment options and to initiate/adjust therapy      Sincerely,     Authenticated electronically by Celestino Cruz MD   on 99/41/43   Board Certified Specialist Implemented All Universal Safety Interventions:  [place] to call system. Call bell, personal items and telephone within reach. Instruct patient to call for assistance. Room bathroom lighting operational. Non-slip footwear when patient is off stretcher. Physically safe environment: no spills, clutter or unnecessary equipment. Stretcher in lowest position, wheels locked, appropriate side rails in place.

## 2021-11-18 ENCOUNTER — OFFICE VISIT (OUTPATIENT)
Dept: CARDIOLOGY CLINIC | Facility: CLINIC | Age: 66
End: 2021-11-18
Payer: COMMERCIAL

## 2021-11-18 VITALS
HEART RATE: 50 BPM | DIASTOLIC BLOOD PRESSURE: 80 MMHG | HEIGHT: 72 IN | OXYGEN SATURATION: 97 % | WEIGHT: 232 LBS | SYSTOLIC BLOOD PRESSURE: 150 MMHG | BODY MASS INDEX: 31.42 KG/M2

## 2021-11-18 DIAGNOSIS — I10 ESSENTIAL HYPERTENSION: ICD-10-CM

## 2021-11-18 DIAGNOSIS — I48.92 PAROXYSMAL ATRIAL FLUTTER (HCC): Primary | ICD-10-CM

## 2021-11-18 DIAGNOSIS — E78.2 MIXED HYPERLIPIDEMIA: ICD-10-CM

## 2021-11-18 DIAGNOSIS — G47.33 OSA (OBSTRUCTIVE SLEEP APNEA): ICD-10-CM

## 2021-11-18 PROCEDURE — 99214 OFFICE O/P EST MOD 30 MIN: CPT | Performed by: NURSE PRACTITIONER

## 2021-11-18 RX ORDER — HYDROCHLOROTHIAZIDE 25 MG/1
25 TABLET ORAL DAILY
Qty: 30 TABLET | Refills: 11 | Status: SHIPPED | OUTPATIENT
Start: 2021-11-18

## 2021-12-02 ENCOUNTER — TELEPHONE (OUTPATIENT)
Dept: CARDIOLOGY CLINIC | Facility: CLINIC | Age: 66
End: 2021-12-02

## 2021-12-02 ENCOUNTER — CLINICAL SUPPORT (OUTPATIENT)
Dept: CARDIOLOGY CLINIC | Facility: CLINIC | Age: 66
End: 2021-12-02
Payer: COMMERCIAL

## 2021-12-02 VITALS
DIASTOLIC BLOOD PRESSURE: 81 MMHG | SYSTOLIC BLOOD PRESSURE: 150 MMHG | OXYGEN SATURATION: 98 % | HEART RATE: 49 BPM | BODY MASS INDEX: 31.46 KG/M2 | HEIGHT: 72 IN

## 2021-12-02 DIAGNOSIS — I10 ESSENTIAL HYPERTENSION: Primary | ICD-10-CM

## 2021-12-02 PROCEDURE — 99211 OFF/OP EST MAY X REQ PHY/QHP: CPT

## 2022-02-24 ENCOUNTER — OFFICE VISIT (OUTPATIENT)
Dept: CARDIOLOGY CLINIC | Facility: CLINIC | Age: 67
End: 2022-02-24
Payer: COMMERCIAL

## 2022-02-24 VITALS
DIASTOLIC BLOOD PRESSURE: 78 MMHG | OXYGEN SATURATION: 98 % | SYSTOLIC BLOOD PRESSURE: 118 MMHG | BODY MASS INDEX: 31.64 KG/M2 | TEMPERATURE: 97.3 F | WEIGHT: 233.6 LBS | HEIGHT: 72 IN | HEART RATE: 52 BPM

## 2022-02-24 DIAGNOSIS — R00.1 BRADYCARDIA: ICD-10-CM

## 2022-02-24 DIAGNOSIS — I48.92 ATRIAL FLUTTER (HCC): ICD-10-CM

## 2022-02-24 DIAGNOSIS — E78.2 MIXED HYPERLIPIDEMIA: ICD-10-CM

## 2022-02-24 DIAGNOSIS — G47.33 OSA (OBSTRUCTIVE SLEEP APNEA): ICD-10-CM

## 2022-02-24 DIAGNOSIS — I10 ESSENTIAL HYPERTENSION: ICD-10-CM

## 2022-02-24 DIAGNOSIS — I48.92 PAROXYSMAL ATRIAL FLUTTER (HCC): Primary | ICD-10-CM

## 2022-02-24 PROCEDURE — 99214 OFFICE O/P EST MOD 30 MIN: CPT | Performed by: INTERNAL MEDICINE

## 2022-02-24 RX ORDER — APIXABAN 5 MG/1
TABLET, FILM COATED ORAL
Qty: 60 TABLET | Refills: 11 | Status: SHIPPED | OUTPATIENT
Start: 2022-02-24

## 2022-02-24 NOTE — PATIENT INSTRUCTIONS
Continue current medications without change  ECG today shows normal rhythm with a heart rate of 52 bpm   Would recommend 24 hour Holter monitor to assess heart rates  Continue exercise program   Call me with any questions or concerns

## 2022-02-24 NOTE — PROGRESS NOTES
hyperlipideCardiology Office Visit    Jenise Sanders  [id number]  1955    [facility]  [address]      Dear Eloisa Reece MD,    I had the pleasure of seeing your patient at our [facility] [place] [facility] office today 2/24/2022  As you know he is a pleasant 78y o  year old male with a medical history as described below  Reason for office visit: Follow up paroxysmal atrial fibrillation, hypertension and hyperlipidemia  1  Paroxysmal atrial flutter [facility])  Assessment & Plan:  New onset atrial flutter with RVR discovered 1/2021, unclear duration  Chads Vasc 2 on Eliquis 5 mg BID for anticoagulation  Patient is s/p BONG guided cardioversion 1/4/2020  BONG 1/4/2021 EF 55-60% without significant valvular abnormalities  14 day ZIO monitor 5/2021 revealed avg HR 48 bpm with 178 episodes of SVT, longest 16 beats  Positive sleep study and now using CPAP  Exercise stress test 4/23/2021 was negative for ischemia or exercise induced AF  He had and continues to reduce his caffeine intake  Continue metoprolol tartrate 50 mg BID  Unable to titrate further secondary to bradycardia  ECG today 2/24/2022 shows sinus bradycardia at 52 bpm    Will check 24 hour Holter monitor to assess heart rates  Low threshold for EP evaluation and atrial flutter ablation  Discussed [address] monitoring device as well as iWatch which may be helpful in monitoring for recurrent atrial flutter as the patient was asymptomatic with rapid rates  Orders:  -     Holter monitor; Future; Expected date: 02/24/2022    2  Essential hypertension  Assessment & Plan:  Blood pressure was sub-optimal at last office visit  HCTZ 25 mg was added at last office visit  Currently on metoprolol tartrate 50 mg BID, amlodipine 5mg daily, losartan 100mg daily and HCTZ 25 mg daily         3  CADY (obstructive sleep apnea)  Assessment & Plan:  Compliant with CPAP  Follows with Dr Moe Valdivia with [facility] sleep medicine  4  Mixed hyperlipidemia  Assessment & Plan:  Lipid panel 7/29/2021:     HDL 31    Goal LDL < 100  He has recently made dietary modifications and continues to follow them  Will continue to monitor  5  Bradycardia  Assessment & Plan:  Bradycardia noted on ECG today 2/24/2022 as well as on ZIO 5/2021 which showed an average heart rate of 48 bpm    Patient denies any symptoms  Will check 24 hour Holter monitor  Orders:  -     Holter monitor; Future; Expected date: 02/24/2022           HPI     Sia Callaway has a past medical history of paroxysmal atrial fibrillation, HTN, HLD, and CADY now on CPAP      Sia Callaway presented to [address] [place] [address] on 1/2/2021 following an urgent care visit for urinary symptoms  He was diagnosed with a UTI, but also found to be in atrial flutter with RVR  HR was in the 130's upon presentation  He was started on diltiazem drip and was given cardiazem IV as well as metoprolol with minimal response  He underwent BONG guided cardioversion on 1/4/2021 and converted to sinus rhythm  He was discharged on Eliquis  5 mg twice daily and metoprolol tartrate 75mg BID      Following hospital discharge he did undergo an exercise stress test 4/23/2021 which showed no evidence of ischemia (full details below)  He had a ZIO monitor 5/2021 with avg HR 48 bpm and 178 episodes of SVT with the longest run 16 beats       He was subsequently diagnosed with CADY and started CPAP therapy  Patient was last seen in the office by Kapil Steel 11/18/2021 at which time he was doing well overall  He noted improvement in energy levels with CPAP use  HCTZ was added for more optimal blood pressure control  2/24/2022: Patient returns to the office today for follow up  He did lose some weight but has gained back some  He is walking every day up to 3 5 miles depending on the day   He is also using exercise bands to work out  No chest pain  No shortness of breath  No lightheadedness or dizziness  No palpitations   ECG today shows sinus bradycardia at 52 BPM      Patient Active Problem List   Diagnosis    Paroxysmal atrial flutter (HCC)    Acute cystitis without hematuria    Essential hypertension    Coagulation disorder ([facility] [address] )    CADY (obstructive sleep apnea)    Mixed hyperlipidemia    Bradycardia     Past Medical History:   Diagnosis Date    Atrial flutter (HCC)     Hypertension     Mixed hyperlipidemia     CADY (obstructive sleep apnea)      Social History     Socioeconomic History    Marital status: /Civil Union     Spouse name: Not on file    Number of children: Not on file    Years of education: Not on file    Highest education level: Not on file   Occupational History    Occupation: Retired     Occupation:      Comment: Works part time since retiring   Tobacco Use    Smoking status: Never Smoker    Smokeless tobacco: Never Used   Vaping Use    Vaping Use: Never used   Substance and Sexual Activity    Alcohol use: Not Currently    Drug use: Never    Sexual activity: Not on file     Comment: Defer   Other Topics Concern    Not on file   Social History Narrative    Not on file     Social Determinants of Health     Financial Resource Strain: Not on file   Food Insecurity: Not on file   Transportation Needs: Not on file   Physical Activity: Not on file   Stress: Not on file   Social Connections: Not on file   Intimate Partner Violence: Not on file   Housing Stability: Not on file      Family History   Problem Relation Age of Onset    No Known Problems Mother     Hypertension Father      Past Surgical History:   Procedure Laterality Date    CARDIOVERSION  01/04/2021    TONSILLECTOMY  age 5   Sonia Belch WISDOM TOOTH EXTRACTION         Current Outpatient Medications:     amLODIPine (NORVASC) 5 mg tablet, Take 1 tablet (5 mg total) by mouth daily, Disp: 90 tablet, Rfl: 3    [facility] Magnesium Oxide 250 MG TABS, Take 1 tablet (250 mg total) by mouth daily, Disp: 30 tablet, Rfl: 11    hydrochlorothiazide (HYDRODIURIL) 25 mg tablet, Take 1 tablet (25 mg total) by mouth daily, Disp: 30 tablet, Rfl: 11    losartan (COZAAR) 100 MG tablet, Take 1 tablet (100 mg total) by mouth daily, Disp: 90 tablet, Rfl: 3    metoprolol tartrate (LOPRESSOR) 50 mg tablet, Take 1 tablet (50 mg total) by mouth every 12 (twelve) hours, Disp: 180 tablet, Rfl: 3    Eliquis 5 MG, TAKE 1 TABLET BY MOUTH TWICE A DAY, Disp: 60 tablet, Rfl: 11  No Known Allergies      Cardiac Test Results:     ECG 2/24/2022: Sinus bradycardia at 52 bpm  IVCD  Non specific ST abnormality  Lipid panel 7/29/2021: C 187  T 217  H 31  L 113  ZIO 5/6-5/20/2021:  Min HR 32  Max   Avg HR 48     One 4 beat run of VT  178 episodes of SVT with the longest lasting 16 beats  Occasional PAC's  Rare PVC's       Exercise stress test 4/23/2021:  Artur protocol  8:04  METs 10 1  Frequent PAC's with short burst of probable AT and occasional couplet PVC's and one 3 beat run NSVT  No evidence of ischemia  BONG 1/4/2021:  EF 55-60%  Mild LVH  Left and right atrium mildly dilated  Mild spontaneous echo contrast without evidence of left atrial appendage thrombus  No ASD or PFO with bubble study  Trace to mild MR  Trace to mild TR  Review of Systems:    Review of Systems   Constitutional: Positive for fatigue (improved)  Negative for activity change and appetite change  HENT: Negative for congestion, hearing loss, tinnitus and trouble swallowing  Eyes: Negative for visual disturbance  Respiratory: Negative for cough, chest tightness, shortness of breath and wheezing  Cardiovascular: Negative for chest pain, palpitations and leg swelling  Gastrointestinal: Negative for abdominal distention, abdominal pain, nausea and vomiting  Genitourinary: Negative for difficulty urinating     Musculoskeletal: Negative for arthralgias  Skin: Negative for rash  Neurological: Negative for dizziness, syncope and light-headedness  Hematological: Does not bruise/bleed easily  Psychiatric/Behavioral: Negative for confusion  The patient is not nervous/anxious  All other systems reviewed and are negative  Vitals:    02/24/22 0919 02/24/22 0955   BP: 148/88 118/78   BP Location: Left arm Left arm   Patient Position: Supine    Cuff Size: Standard    Pulse: (!) 52    Temp: (!) 97 3 °F (36 3 °C)    SpO2: 98%    Weight: 106 kg (233 lb 9 6 oz)    Height: 6' (1 829 m)      Vitals:    02/24/22 0919   Weight: 106 kg (233 lb 9 6 oz)     Height: 6' (182 9 cm)     Physical Exam  Vitals reviewed  Constitutional:       Appearance: He is well-developed  HENT:      Head: Normocephalic and atraumatic  Eyes:      Conjunctiva/sclera: Conjunctivae normal       Pupils: Pupils are equal, round, and reactive to light  Neck:      Vascular: No JVD  Cardiovascular:      Rate and Rhythm: Regular rhythm  Bradycardia present  Heart sounds: Normal heart sounds  No murmur heard  No friction rub  No gallop  Pulmonary:      Effort: Pulmonary effort is normal       Breath sounds: Normal breath sounds  Abdominal:      General: Bowel sounds are normal       Palpations: Abdomen is soft  Musculoskeletal:      Cervical back: Normal range of motion  Skin:     General: Skin is warm and dry  Neurological:      Mental Status: He is alert and oriented to person, place, and time     Psychiatric:         Behavior: Behavior normal

## 2022-02-25 PROBLEM — R00.1 BRADYCARDIA: Status: ACTIVE | Noted: 2022-02-25

## 2022-02-25 PROCEDURE — 93000 ELECTROCARDIOGRAM COMPLETE: CPT | Performed by: INTERNAL MEDICINE

## 2022-02-25 NOTE — ASSESSMENT & PLAN NOTE
Compliant with CPAP  Follows with Dr Sonia Burns with [facility] sleep [facility]

## 2022-02-25 NOTE — ASSESSMENT & PLAN NOTE
Blood pressure was sub-optimal at last office visit  HCTZ 25 mg was added at last office visit  Currently on metoprolol tartrate 50 mg BID, amlodipine 5mg daily, losartan 100mg daily and HCTZ 25 mg daily

## 2022-02-25 NOTE — ASSESSMENT & PLAN NOTE
New onset atrial flutter with RVR discovered 1/2021, unclear duration  Chads Vasc 2 on Eliquis 5 mg BID for anticoagulation  Patient is s/p BONG guided cardioversion 1/4/2020  BONG 1/4/2021 EF 55-60% without significant valvular abnormalities  14 day ZIO monitor 5/2021 revealed avg HR 48 bpm with 178 episodes of SVT, longest 16 beats  Positive sleep study and now using CPAP  Exercise stress test 4/23/2021 was negative for ischemia or exercise induced AF  He had and continues to reduce his caffeine intake  Continue metoprolol tartrate 50 mg BID  Unable to titrate further secondary to bradycardia  ECG today 2/24/2022 shows sinus bradycardia at 52 bpm    Will check 24 hour Holter monitor to assess heart rates  Low threshold for EP evaluation and atrial flutter ablation  Discussed [address] monitoring device as well as iWatch which may be helpful in monitoring for recurrent atrial flutter as the patient was asymptomatic with rapid rates

## 2022-02-25 NOTE — ASSESSMENT & PLAN NOTE
Bradycardia noted on ECG today 2/24/2022 as well as on ZIO 5/2021 which showed an average heart rate of 48 bpm    Patient denies any symptoms  Will check 24 hour Holter monitor

## 2022-02-25 NOTE — ASSESSMENT & PLAN NOTE
Lipid panel 7/29/2021:     HDL 31    Goal LDL < 100  He has recently made dietary modifications and continues to follow them  Will continue to monitor

## 2022-03-03 ENCOUNTER — HOSPITAL ENCOUNTER (OUTPATIENT)
Dept: NON INVASIVE DIAGNOSTICS | Facility: HOSPITAL | Age: 67
Discharge: HOME | End: 2022-03-03
Attending: INTERNAL MEDICINE
Payer: COMMERCIAL

## 2022-03-03 DIAGNOSIS — R00.1 BRADYCARDIA: ICD-10-CM

## 2022-03-03 DIAGNOSIS — I48.92 PAROXYSMAL ATRIAL FLUTTER (HCC): ICD-10-CM

## 2022-03-03 PROCEDURE — 93225 XTRNL ECG REC<48 HRS REC: CPT

## 2022-03-03 PROCEDURE — 93226 XTRNL ECG REC<48 HR SCAN A/R: CPT

## 2022-03-09 ENCOUNTER — TELEPHONE (OUTPATIENT)
Dept: CARDIOLOGY CLINIC | Facility: CLINIC | Age: 67
End: 2022-03-09

## 2022-03-09 DIAGNOSIS — I48.92 PAROXYSMAL ATRIAL FLUTTER (HCC): ICD-10-CM

## 2022-03-09 NOTE — TELEPHONE ENCOUNTER
----- Message from Carolyn Tafoya sent at 3/9/2022  1:34 PM EST -----  Please notify patient that his heart rate is quite slow at 49 bpm  Dr Zane Estrella would like to reduce his metoprolol tartrate dose to 25 mg twice daily  If he is agreeable let me know and I will adjust script in chart  Thank you

## 2022-03-09 NOTE — TELEPHONE ENCOUNTER
I ordered the metoprolol at reduced dose of 25 mg  He can cut his pills in half to use up his 50's   I put a note to the pharmacy to hold the script for 25 mg pills until refills are needed

## 2022-03-31 PROCEDURE — 93000 ELECTROCARDIOGRAM COMPLETE: CPT | Performed by: INTERNAL MEDICINE

## 2022-03-31 NOTE — ASSESSMENT & PLAN NOTE
Patient was diagnosed with severe obstructive sleep apnea on sleep study 04/08/2021  A follow-up study to titrate positive airway pressure was recommended  He will continue follow-up with sleep medicine  We discussed the importance of treating sleep apnea

## 2022-03-31 NOTE — ASSESSMENT & PLAN NOTE
Patient was bradycardic on exam 1/12/2021 with heart rate of 47 bpm   Metoprolol tartrate 50 mg twice daily  14 day ZIO 5/2021 showed an average heart rate of 48 bpm ()  Heart rate low today (46 bpm)  He does note some fatigue however he denies any lightheadedness or dizziness  We may need to continue down titration of metoprolol tartrate

## 2022-03-31 NOTE — ASSESSMENT & PLAN NOTE
New onset atrial flutter with RVR discovered 1/2021 of unclear duration  Chads Vasc 2 on Eliquis 5 mg BID for anticoagulation  Patient is s/p BONG guided cardioversion 1/4/2021  BONG 1/4/2021 EF 55-60% without significant valvular abnormalities  14 day ZIO monitor notable for 178 episodes of SVT with the longest lasting 16 beats  Average heart rate 48 bpm   Unfortunately we are unable to up titrate beta-blocker due to an average heart rate of 48 bpm   Sleep study 4/8/2021 showed severe sleep apnea with severe sleep fragmentation and sleep maintenance insomnia  Titration study was recommended  Exercise stress test 04/23/2021 showed no evidence of ischemia (3 beat run of NSVT)  Frequent PACs and short burst of probable atrial tachycardia was noted  I had asked him to reduce his caffeine intake which he has done  Continue metoprolol tartrate 50 mg BID  Average heart rate on ZIO was 48 bpm    We may need to further down titrate metoprolol tartrate  Will plan repeat ZIO once patient is started on CPAP  Continue magnesium 250 mg daily  ECG 1/12/2021 showed sinus bradycardia at 47 bpm    Low threshold for EP evaluation and atrial flutter ablation

## 2022-03-31 NOTE — PROGRESS NOTES
Cardiology Office Visit    Lynne Irwin  [id number]  1955    [facility]  [address]  [facility] [address]  [phone]      Dear Roberto Paul MD,    I had the pleasure of seeing your patient at our [address] Card[facility] [place] office today 3/18/2021  As you know he is a pleasant male with a medical history as described below  Reason for office visit: Follow up atrial fibrillation, hypertension and hyperlipidemia  1  Paroxysmal atrial flutter [facility])  Assessment & Plan:  New onset atrial flutter with RVR discovered 1/2021 of unclear duration  Chads Vasc 2 on Eliquis 5 mg BID for anticoagulation  Patient is s/p BONG guided cardioversion 1/4/2021  BONG 1/4/2021 EF 55-60% without significant valvular abnormalities  Recommend 14 day ZIO monitor which was ordered at last office visit  Needs sleep study to rule out CADY which is scheduled for 4/8/2021  Will plan exercise stress test to rule out underlying ischemia  I have asked him to reduce his caffeine intake which he has done  Metoprolol tartrate 75 mg BID was decreased to 50 mg BID at last office visit due to fatigue and bradycardia on ECG (47 bpm)  Heart rate improved on exam today  Will await ZIO report  Continue magnesium 250 mg daily  ECG 1/12/2021 showed sinus bradycardia at 47 bpm    Low threshold for EP evaluation and atrial flutter ablation  2  Essential hypertension  Assessment & Plan:  Blood pressure remains elevated on exam despite the addition of losartan which has been up titrated to 100 mg daily  Will continue metoprolol tartrate 50 mg BID  Recommend adding amlodipine 2 5 mg daily  Will plan blood pressure check in the office and continue up titration as needed  Orders:  -     Stress test only, exercise; Future; Expected date: 03/18/2021    3  Mixed hyperlipidemia  Assessment & Plan:  Lipid panel 7/28/2020: C 232  T 303  H 35  L 136  Would recommend dietary modification to start but patient likely would benefit from the addition of statin if cholesterol remains elevated  Will plan repeat lipid panel 7/2021        4  Bradycardia  Assessment & Plan:  Patient was bradycardic on exam 1/12/2021 with heart rate of 47 bpm   I had recommended decreasing metoprolol tartrate 75 mg twice daily to 50 mg twice daily  14 day ZIO was ordered but not yet completed  Heart rate improved today (64 bpm)  ELPIDIO     Kevan Gonzalez has a past medical history of paroxysmal atrial fibrillation, hypertension and hyperlipidemia  Kevan Gonzalez presented to [address] [place] [address] on 1/2/2021 following an urgent care visit for urinary symptoms  He was diagnosed with a UTI, but also found to be in atrial flutter with RVR  HR was in the 130's upon presentation  He underwent BONG guided cardioversion on 1/4/2021 with return to normal sinus rhythm  He was discharged home on 1/5/2021 with outpatient cardiology follow up  He was discharged on Eliquis anticoagulation and metoprolol tartrate 75mg BID       1/12/2021:  Patient presents to the office today for follow-up  He denies any recurrent palpitations  He denies any chest pain  He denies any shortness of breath  He has returned to work  He does note some increased fatigue  He was seen by his PCP yesterday  No changes were made to his regimen at that time  ECG today shows sinus bradycardia at 47 bpm queenie nonspecific ST abnormality  3/18/2021:  Patient returns to the office today for follow-up  Had decreased his metoprolol tartrate down to 50 mg twice daily at last office visit due to bradycardia  He continues to deny any palpitations  He denies any lightheadedness or dizziness  He denies any chest pain  He denies any shortness of breath  He has been walking more frequently since his last office visit  He does have a sleep study scheduled for 08/20/2021  ZIO has not yet been completed  He continues to note mild fatigue   I had started him on losartan 25 mg at last office visit and have since then increased it to 100 mg due to persistent hypertension  Patient Active Problem List   Diagnosis    Atrial flutter with rapid ventricular response ([facility] [address] [facility] )    Acute cystitis without hematuria    Essential hypertension    Coagulation disorder ([facility] )     Past Medical History:   Diagnosis Date    Atrial flutter ([facility] )     Hypertension     Mixed hyperlipidemia      Social History     Socioeconomic History    Marital status: /Civil Union     Spouse name: Not on file    Number of children: Not on file    Years of education: Not on file    Highest education level: Not on file   Occupational History    Occupation: Retired     Occupation:      Comment: Works part time since retiring   Tobacco Use    Smoking status: Never Smoker    Smokeless tobacco: Never Used   Vaping Use    Vaping Use: Never used   Substance and Sexual Activity    Alcohol use: Not Currently    Drug use: Never    Sexual activity: Not on file     Comment: Defer   Other Topics Concern    Not on file   Social History Narrative    Not on file     Social Determinants of Health     Financial Resource Strain: Not on file   Food Insecurity: Not on file   Transportation Needs: Not on file   Physical Activity: Not on file   Stress: Not on file   Social Connections: Not on file   Intimate Partner Violence: Not on file   Housing Stability: Not on file      Family History   Problem Relation Age of Onset    No Known Problems Mother     Hypertension Father      Past Surgical History:   Procedure Laterality Date    CARDIOVERSION  01/04/2021    TONSILLECTOMY  age 5   [place] WISDOM TOOTH EXTRACTION         * Current medications reviewed  No Known Allergies    Cardiac Test Results:     ECG 01/12/2021:  Sinus bradycardia  47 bpm   Nonspecific ST abnormality  BONG 1/4/2021:  EF 55-60%  No regional wall motion abnormalities  Mild LVH   Left and right atrium mildly dilated  Mild spontaneous echo contrast without evidence of left atrial appendage thrombus  No ASD or PFO with bubble study  Trace to mild MR  Trace to mild TR  Lipid panel 7/28/2020: C 232  T 303  H 35  L 136  Review of Systems:    Review of Systems   Constitutional: Positive for fatigue  Negative for activity change and appetite change  HENT: Negative for congestion, hearing loss, tinnitus and trouble swallowing  Eyes: Negative for visual disturbance  Respiratory: Negative for cough, chest tightness, shortness of breath and wheezing  Cardiovascular: Negative for chest pain, palpitations and leg swelling  Gastrointestinal: Negative for abdominal distention, abdominal pain, nausea and vomiting  Genitourinary: Negative for difficulty urinating  Musculoskeletal: Negative for arthralgias  Skin: Negative for rash  Neurological: Negative for dizziness, syncope and light-headedness  Hematological: Does not bruise/bleed easily  Psychiatric/Behavioral: Negative for confusion  The patient is not nervous/anxious  All other systems reviewed and are negative  Vitals:    03/18/21 1407 03/18/21 1504   BP: (!) 192/100 168/88   Pulse: 64    Temp: 97 5 °F (36 4 °C)    SpO2: 99%    Weight: 108 kg (238 lb 9 6 oz)    Height: 6' (1 829 m)      Vitals:    03/18/21 1407   Weight: 108 kg (238 lb 9 6 oz)     Height: 6' (182 9 cm)     Physical Exam  Constitutional:       Appearance: He is well-developed  HENT:      Head: Normocephalic and atraumatic  Eyes:      Conjunctiva/sclera: Conjunctivae normal       Pupils: Pupils are equal, round, and reactive to light  Neck:      Vascular: No JVD  Cardiovascular:      Rate and Rhythm: Normal rate and regular rhythm  Heart sounds: Normal heart sounds  No murmur heard  No friction rub  No gallop  Pulmonary:      Effort: Pulmonary effort is normal       Breath sounds: Normal breath sounds     Abdominal:      General: Bowel sounds are normal       Palpations: Abdomen is soft  Musculoskeletal:      Cervical back: Normal range of motion  Skin:     General: Skin is warm and dry  Neurological:      Mental Status: He is alert and oriented to person, place, and time     Psychiatric:         Behavior: Behavior normal

## 2022-03-31 NOTE — ASSESSMENT & PLAN NOTE
Blood pressure is elevated on exam   I am decreasing metoprolol dose due to bradycardia which may further increase his blood pressure  I have recommended starting losartan 25 mg daily  Will up titrate for blood pressure optimization

## 2022-03-31 NOTE — ASSESSMENT & PLAN NOTE
Blood pressure remains elevated on exam despite the addition of losartan which has been up titrated to 100 mg daily  Will continue metoprolol tartrate 50 mg BID  Recommended adding amlodipine 2 5 mg daily at last office visit which has since been increased to 5 mg daily  Will plan blood pressure check in the office (with his home cuff to check calibration as his home readings are better controlled) and continue up titration as needed  I have made no changes to his BP medications today

## 2022-03-31 NOTE — PROGRESS NOTES
Cardiology Office Visit    Iban Bowie  [id number]  1955    [facility] [address]  [facility] [id number] [phone]      Dear Judge Alexis MD,    I had the pleasure of seeing your patient at our [address] [facility] office today 9/24//2021  As you know he is a pleasant male with a medical history as described below  Reason for office visit: Follow up atrial fibrillation, hypertension and hyperlipidemia  1  Paroxysmal atrial flutter [facility])  Assessment & Plan:  New onset atrial flutter with RVR discovered 1/2021 of unclear duration  Chads Vasc 2 on Eliquis 5 mg BID for anticoagulation  Patient is s/p BONG guided cardioversion 1/4/2021  BONG 1/4/2021 EF 55-60% without significant valvular abnormalities  14 day ZIO monitor notable for 178 episodes of SVT with the longest lasting 16 beats  Average heart rate 48 bpm   Unfortunately we are unable to up titrate beta-blocker due to an average heart rate of 48 bpm   Sleep study 4/8/2021 showed severe sleep apnea with severe sleep fragmentation and sleep maintenance insomnia  Patient has since started CPAP  I would recommend repeat 14 day ZIO at follow-up  Exercise stress test 04/23/2021 showed no evidence of ischemia (3 beat run of NSVT)  Frequent PACs and short burst of probable atrial tachycardia was noted  I had asked him to reduce his caffeine intake which he has done  Continue metoprolol tartrate 50 mg BID  Average heart rate on ZIO was 48 bpm    We may need to further down titrate metoprolol tartrate  Will plan repeat ZIO once patient is started on CPAP  Continue magnesium 250 mg daily  ECG 1/12/2021 showed sinus bradycardia at 47 bpm    Low threshold for EP evaluation and atrial flutter ablation        2  Essential hypertension  Assessment & Plan:  Blood pressure remains elevated (but improved) on exam despite losartan 100 mg daily, amlodipine 5 mg daily and metoprolol tartrate 50 mg twice daily  I have made no changes to his BP medications today  I have recommended 10 lb weight loss  I am hoping that with consistent CPAP use his blood pressure will improve  If blood pressure remains elevated at follow-up would recommend the addition of HCTZ 25 mg daily  Orders:  -     amLODIPine (NORVASC) 5 mg tablet; Take 1 tablet (5 mg total) by mouth daily  -     losartan (COZAAR) 100 MG tablet; Take 1 tablet (100 mg total) by mouth daily    3  Mixed hyperlipidemia  Assessment & Plan:  Lipid panel 7/29/2021: C 187  T 217  H 31  L 113  Lipid panel 7/28/2020: C 232  T 303  H 35  L 136  Repeat lipid panel 07/2021 showed improved LDL from 136 to 113  Would recommend continued dietary modification to start but patient likely would benefit from the addition of statin if cholesterol remains elevated  10 year ASCVD risk 26 1%  Will discuss addition of statin therapy at follow up given his ASCVD risk  4  Bradycardia  Assessment & Plan:  Patient was bradycardic on exam 1/12/2021 with heart rate of 47 bpm   Metoprolol tartrate 50 mg twice daily  14 day ZIO 5/2021 showed an average heart rate of 48 bpm ()  Heart rate remains low today (49 bpm)  He does note some fatigue however he denies any lightheadedness or dizziness  We may need to continue down titration of metoprolol tartrate  Will plan repeat monitoring at follow up        5  CADY (obstructive sleep apnea)  Assessment & Plan:  Patient was diagnosed with severe obstructive sleep apnea on sleep study 04/08/2021  Patient did start CPAP and feels like he is sleeping better  He will continue follow-up with sleep medicine  He is aware of  the importance of treating sleep apnea  HPI     Hossein Conte has a past medical history of paroxysmal atrial fibrillation, hypertension, hyperlipidemia and obstructive sleep apnea       Hossein Conte presented to [facility] ER on 1/2/2021 following an urgent care visit for urinary symptoms  He was diagnosed with a UTI, but also found to be in atrial flutter with RVR  HR was in the 130's upon presentation  He underwent BONG guided cardioversion on 1/4/2021 with return to normal sinus rhythm  He was discharged home on 1/5/2021 with outpatient cardiology follow up  He was discharged on Eliquis anticoagulation and metoprolol tartrate 75mg BID       1/12/2021:  Patient presents to the office today for follow-up  He denies any recurrent palpitations  He denies any chest pain  He denies any shortness of breath  He has returned to work  He does note some increased fatigue  He was seen by his PCP yesterday  No changes were made to his regimen at that time  ECG today shows sinus bradycardia at 47 bpm wiith nonspecific ST abnormality  3/18/2021:  Patient returns to the office today for follow-up  Had decreased his metoprolol tartrate down to 50 mg twice daily at last office visit due to bradycardia  He continues to deny any palpitations  He denies any lightheadedness or dizziness  He denies any chest pain  He denies any shortness of breath  He has been walking more frequently since his last office visit  He does have a sleep study scheduled for 08/20/2021  ZIO has not yet been completed  He continues to note mild fatigue  I had started him on losartan 25 mg at last office visit and have since then increased it to 100 mg due to persistent hypertension  6/18/2021: Patient returns to the office today for follow up  He is doing well in general  He did have sleep study done 4/8/2021 which showed severe obstructive sleep apnea as well as severe sleep fragmentation and sleep maintenance insomnia  A follow-up study to titrate positive airway pressure was strongly recommended  ZIO 5/2021 showed an average heart rate of 48 beats per minute ( bpm)  One 4 beat run of VT was noted  178 episodes of SVT were noted with the longest lasting 16 beats    Exercise stress test 4/23/2021 was negative for ischemia and exercise induced AF  One 3 beat run of NSVT was noted  Domenico Zimmermna continues to deny any palpitations  He denies any chest pain  He denies any shortness of breath  He denies any lightheadedness or dizziness  He does continue to note fatigue which does not surprise me in the setting of his sleep study results  He has been trying to walk longer distances  We discussed the results of his testing  Amlodipine was increased to 5 mg daily since his last office visit  9/24/2021:  Patient presents to the office today for follow-up  He tells me that he started CPAP approximately 1 month ago  He seems to be sleeping better  He did have an office visit with Dr Argentina Glasgow yesterday at which time his blood pressure was 140/70  He has been walking every day approximately 3-1/2 miles as well as 2 miles at work  He is drinking 1 cup of coffee a day  He denies any palpitations  He denies any lightheadedness or dizziness  He denies any chest pain  He denies any bleeding issues on anticoagulation        Patient Active Problem List   Diagnosis    Paroxysmal atrial flutter (HCC)    Acute cystitis without hematuria    Essential hypertension    [facility]    CADY (obstructive sleep apnea)    Mixed hyperlipidemia    Bradycardia     Past Medical History:   Diagnosis Date    [facility] )     Hypertension     Mixed hyperlipidemia     CADY (obstructive sleep apnea)        Social History     Socioeconomic History    Marital status: /Civil Union     Spouse name: Not on file    Number of children: Not on file    Years of education: Not on file    Highest education level: Not on file   Occupational History    Occupation: Retired     Occupation:      Comment: Works part time since retiring   Tobacco Use    Smoking status: Never Smoker    Smokeless tobacco: Never Used   Vaping Use    Vaping Use: Never used   Substance and Sexual Activity    Alcohol use: Not Currently    Drug use: Never    Sexual activity: Not on file     Comment: Defer   Other Topics Concern    Not on file   Social History Narrative    Not on file     Social Determinants of Health     Financial Resource Strain: Not on file   Food Insecurity: Not on file   Transportation Needs: Not on file   Physical Activity: Not on file   Stress: Not on file   Social Connections: Not on file   Intimate Partner Violence: Not on file   Housing Stability: Not on file      Family History   Problem Relation Age of Onset    No Known Problems Mother     Hypertension Father      Past Surgical History:   Procedure Laterality Date    CARDIOVERSION  01/04/2021    TONSILLECTOMY  age 5   Radha Courser WISDOM TOOTH EXTRACTION         * Current medications reviewed  No Known Allergies    Cardiac Test Results:     Lipid panel 7/29/2021: C 187  T 217  H 31  L 113  Exercise stress test 4/23/2021:  Artur protocol  8:04  METs 10 1  Frequent PAC's with short burst of probable AT and occasional couplet PVC's and one 3 beat run NSVT  No evidence of ischemia  Sleep study 4/8/2021: Severe sleep apnea  Severe sleep fragmentation  Sleep maintenance insomnia  ZIO 5/6-5/20/2021  Min HR 32  Max   Avg HR 48     One 4 beat run of VT  178 episodes of SVT with the longest lasting 16 beats  Occasional PAC's  Rare PVC's  ECG 01/12/2021:  Sinus bradycardia  47 bpm   Nonspecific ST abnormality  BONG 1/4/2021:  EF 55-60%  No regional wall motion abnormalities  Mild LVH  Left and right atrium mildly dilated  Mild spontaneous echo contrast without evidence of left atrial appendage thrombus  No ASD or PFO with bubble study  Trace to mild MR  Trace to mild TR  Lipid panel 7/28/2020: C 232  T 303  H 35  L 136  Review of Systems:    Review of Systems   Constitutional: Positive for fatigue  Negative for activity change and appetite change     HENT: Negative for congestion, hearing loss, tinnitus and trouble swallowing  Eyes: Negative for visual disturbance  Respiratory: Negative for cough, chest tightness, shortness of breath and wheezing  Cardiovascular: Negative for chest pain, palpitations and leg swelling  Gastrointestinal: Negative for abdominal distention, abdominal pain, nausea and vomiting  Genitourinary: Negative for difficulty urinating  Musculoskeletal: Negative for arthralgias  Skin: Negative for rash  Neurological: Negative for dizziness, syncope and light-headedness  Hematological: Does not bruise/bleed easily  Psychiatric/Behavioral: Negative for confusion  The patient is not nervous/anxious  All other systems reviewed and are negative  Vitals:    09/24/21 0928 09/24/21 0948   BP: (!) 172/90 148/86   BP Location:  Left arm   Patient Position:  Sitting   Pulse: (!) 49    SpO2: 99%    Weight: 107 kg (236 lb)    Height: 6' (1 829 m)      Vitals:    09/24/21 0928   Weight: 107 kg (236 lb)     Height: 6' (182 9 cm)     Physical Exam  Constitutional:       Appearance: He is well-developed  HENT:      Head: Normocephalic and atraumatic  Eyes:      Conjunctiva/sclera: Conjunctivae normal       Pupils: Pupils are equal, round, and reactive to light  Neck:      Vascular: No JVD  Cardiovascular:      Rate and Rhythm: Regular rhythm  Bradycardia present  Heart sounds: Normal heart sounds  No murmur heard  No friction rub  No gallop  Pulmonary:      Effort: Pulmonary effort is normal       Breath sounds: Normal breath sounds  Abdominal:      General: Bowel sounds are normal       Palpations: Abdomen is soft  Musculoskeletal:      Cervical back: Normal range of motion  Skin:     General: Skin is warm and dry  Neurological:      Mental Status: He is alert and oriented to person, place, and time     Psychiatric:         Behavior: Behavior normal

## 2022-03-31 NOTE — ASSESSMENT & PLAN NOTE
New onset atrial flutter with RVR discovered 1/2021 of unclear duration  Chads Vasc 2 on Eliquis 5 mg BID for anticoagulation  Patient is s/p BONG guided cardioversion 1/4/2021  BONG 1/4/2021 EF 55-60% without significant valvular abnormalities  Recommend 14 day ZIO monitor  Needs sleep study to rule out CADY  Recommend exercise stress test to rule out underlying ischemia  I have asked him to reduce his caffeine intake  Currently on metoprolol tartrate 75 mg BID with fatigue and bradycardia on ECG today (47 bpm)  Decrease metoprolol to 50 mg twice daily  Continue magnesium 250 mg daily  ECG 1/12/2021 shows sinus bradycardia at 47 bpm    Low threshold for EP evaluation and atrial flutter ablation

## 2022-03-31 NOTE — PROGRESS NOTES
Cardiology Office Visit    Karlene Rivera  [id number]  1955    [facility]  [address]  [facility] [address] [phone]      Dear Gabe Gurrola MD,    I had the pleasure of seeing your patient at our [address] [facility] [place] office today 6/18/2021  As you know he is a pleasant male with a medical history as described below  Reason for office visit: Follow up atrial fibrillation, hypertension and hyperlipidemia  1  Paroxysmal atrial flutter [facility])  Assessment & Plan:  New onset atrial flutter with RVR discovered 1/2021 of unclear duration  Chads Vasc 2 on Eliquis 5 mg BID for anticoagulation  Patient is s/p BONG guided cardioversion 1/4/2021  BONG 1/4/2021 EF 55-60% without significant valvular abnormalities  14 day ZIO monitor notable for 178 episodes of SVT with the longest lasting 16 beats  Average heart rate 48 bpm   Unfortunately we are unable to up titrate beta-blocker due to an average heart rate of 48 bpm   Sleep study 4/8/2021 showed severe sleep apnea with severe sleep fragmentation and sleep maintenance insomnia  Titration study was recommended  Exercise stress test 04/23/2021 showed no evidence of ischemia (3 beat run of NSVT)  Frequent PACs and short burst of probable atrial tachycardia was noted  I had asked him to reduce his caffeine intake which he has done  Continue metoprolol tartrate 50 mg BID  Average heart rate on ZIO was 48 bpm    We may need to further down titrate metoprolol tartrate  Will plan repeat ZIO once patient is started on CPAP  Continue magnesium 250 mg daily  ECG 1/12/2021 showed sinus bradycardia at 47 bpm    Low threshold for EP evaluation and atrial flutter ablation  2  Essential hypertension  Assessment & Plan:  Blood pressure remains elevated on exam despite the addition of losartan which has been up titrated to 100 mg daily    Will continue metoprolol tartrate 50 mg BID  Recommended adding amlodipine 2 5 mg daily at last office visit which has since been increased to 5 mg daily  Will plan blood pressure check in the office (with his home cuff to check calibration as his home readings are better controlled) and continue up titration as needed  I have made no changes to his BP medications today  3  Mixed hyperlipidemia  Assessment & Plan:  Lipid panel 7/28/2020: C 232  T 303  H 35  L 136  Would recommend dietary modification to start but patient likely would benefit from the addition of statin if cholesterol remains elevated  Will plan repeat lipid panel 7/2021        4  Bradycardia  Assessment & Plan:  Patient was bradycardic on exam 1/12/2021 with heart rate of 47 bpm   Metoprolol tartrate 50 mg twice daily  14 day ZIO 5/2021 showed an average heart rate of 48 bpm ()  Heart rate low today (46 bpm)  He does note some fatigue however he denies any lightheadedness or dizziness  We may need to continue down titration of metoprolol tartrate  5  CADY (obstructive sleep apnea)  Assessment & Plan:  Patient was diagnosed with severe obstructive sleep apnea on sleep study 04/08/2021  A follow-up study to titrate positive airway pressure was recommended  He will continue follow-up with sleep medicine  We discussed the importance of treating sleep apnea  HPI     Sina Anderson has a past medical history of paroxysmal atrial fibrillation, hypertension and hyperlipidemia  Sina Anderson presented to [address] [place] [address] on 1/2/2021 following an urgent care visit for urinary symptoms  He was diagnosed with a UTI, but also found to be in atrial flutter with RVR  HR was in the 130's upon presentation  He underwent BONG guided cardioversion on 1/4/2021 with return to normal sinus rhythm  He was discharged home on 1/5/2021 with outpatient cardiology follow up   He was discharged on Eliquis anticoagulation and metoprolol tartrate 75mg BID       1/12/2021:  Patient presents to the office today for follow-up  He denies any recurrent palpitations  He denies any chest pain  He denies any shortness of breath  He has returned to work  He does note some increased fatigue  He was seen by his PCP yesterday  No changes were made to his regimen at that time  ECG today shows sinus bradycardia at 47 bpm queenie nonspecific ST abnormality  3/18/2021:  Patient returns to the office today for follow-up  Had decreased his metoprolol tartrate down to 50 mg twice daily at last office visit due to bradycardia  He continues to deny any palpitations  He denies any lightheadedness or dizziness  He denies any chest pain  He denies any shortness of breath  He has been walking more frequently since his last office visit  He does have a sleep study scheduled for 08/20/2021  ZIO has not yet been completed  He continues to note mild fatigue  I had started him on losartan 25 mg at last office visit and have since then increased it to 100 mg due to persistent hypertension  6/18/2021: Patient returns to the office today for follow up  He is doing well in general  He did have sleep study done 4/8/2021 which showed severe obstructive sleep apnea as well as severe sleep fragmentation and sleep maintenance insomnia  A follow-up study to titrate positive airway pressure was strongly recommended  ZIO 5/2021 showed an average heart rate of 48 beats per minute ( bpm)  One 4 beat run of VT was noted  178 episodes of SVT were noted with the longest lasting 16 beats  Exercise stress test 4/23/2021 was negative for ischemia and exercise induced AF  One 3 beat run of NSVT was noted  Sharee Lugo continues to deny any palpitations  He denies any chest pain  He denies any shortness of breath  He denies any lightheadedness or dizziness  He does continue to note fatigue which does not surprise me in the setting of his sleep study results  He has been trying to walk longer distances    We discussed the results of his testing  Amlodipine was increased to 5 mg daily since his last office visit  Patient Active Problem List   Diagnosis    [facility]rial fl[facility] with rapid ventricular response ([facility] )    Acute cystitis without hematuria    Essential hypertension    Coagulation disorder ([facility] )     Past Medical History:   Diagnosis Date    Atrial flutter ([facility] )     Hypertension     Mixed hyperlipidemia      Social History     Socioeconomic History    Marital status: /Civil Union     Spouse name: Not on file    Number of children: Not on file    Years of education: Not on file    Highest education level: Not on file   Occupational History    Occupation: Retired     Occupation:      Comment: Works part time since retiring   Tobacco Use    Smoking status: Never Smoker    Smokeless tobacco: Never Used   Vaping Use    Vaping Use: Never used   Substance and Sexual Activity    Alcohol use: Not Currently    Drug use: Never    Sexual activity: Not on file     Comment: Defer   Other Topics Concern    Not on file   Social History Narrative    Not on file     Social Determinants of Health     Financial Resource Strain: Not on file   Food Insecurity: Not on file   Transportation Needs: Not on file   Physical Activity: Not on file   Stress: Not on file   Social Connections: Not on file   Intimate Partner Violence: Not on file   Housing Stability: Not on file      Family History   Problem Relation Age of Onset    No Known Problems Mother     Hypertension Father      Past Surgical History:   Procedure Laterality Date    CARDIOVERSION  01/04/2021    TONSILLECTOMY  age 5   [employer] WISDOM TOOTH EXTRACTION         * Current medications reviewed  No Known Allergies    Cardiac Test Results:     Exercise stress test 4/23/2021:  Artur protocol  8:04  METs 10 1  Frequent PAC's with short burst of probable AT and occasional couplet PVC's and one 3 beat run NSVT    No evidence of ischemia  Sleep study 4/8/2021: Severe sleep apnea  Severe sleep fragmentation  Sleep maintenance insomnia  ZIO 5/6-5/20/2021  Min HR 32  Max   Avg HR 48     One 4 beat run of VT  178 episodes of SVT with the longest lasting 16 beats  Occasional PAC's  Rare PVC's  ECG 01/12/2021:  Sinus bradycardia  47 bpm   Nonspecific ST abnormality  BONG 1/4/2021:  EF 55-60%  No regional wall motion abnormalities  Mild LVH  Left and right atrium mildly dilated  Mild spontaneous echo contrast without evidence of left atrial appendage thrombus  No ASD or PFO with bubble study  Trace to mild MR  Trace to mild TR  Lipid panel 7/28/2020: C 232  T 303  H 35  L 136  Review of Systems:    Review of Systems   Constitutional: Positive for fatigue  Negative for activity change and appetite change  HENT: Negative for congestion, hearing loss, tinnitus and trouble swallowing  Eyes: Negative for visual disturbance  Respiratory: Negative for cough, chest tightness, shortness of breath and wheezing  Cardiovascular: Negative for chest pain, palpitations and leg swelling  Gastrointestinal: Negative for abdominal distention, abdominal pain, nausea and vomiting  Genitourinary: Negative for difficulty urinating  Musculoskeletal: Negative for arthralgias  Skin: Negative for rash  Neurological: Negative for dizziness, syncope and light-headedness  Hematological: Does not bruise/bleed easily  Psychiatric/Behavioral: Negative for confusion  The patient is not nervous/anxious  All other systems reviewed and are negative          Vitals:    06/18/21 0916 06/18/21 0946   BP: 160/88 160/90   BP Location: Left arm Left arm   Patient Position: Sitting Sitting   Cuff Size: Standard Standard   Pulse: (!) 46    Temp: (!) 96 3 °F (35 7 °C)    SpO2: 98%    Weight: 105 kg (232 lb)    Height: 6' (1 829 m)      Vitals:    06/18/21 0916   Weight: 105 kg (232 lb)     Height: 6' (182 9 cm)     Physical Exam  Constitutional:       Appearance: He is well-developed  HENT:      Head: Normocephalic and atraumatic  Eyes:      Conjunctiva/sclera: Conjunctivae normal       Pupils: Pupils are equal, round, and reactive to light  Neck:      Vascular: No JVD  Cardiovascular:      Rate and Rhythm: Normal rate and regular rhythm  Heart sounds: Normal heart sounds  No murmur heard  No friction rub  No gallop  Pulmonary:      Effort: Pulmonary effort is normal       Breath sounds: Normal breath sounds  Abdominal:      General: Bowel sounds are normal       Palpations: Abdomen is soft  Musculoskeletal:      Cervical back: Normal range of motion  Skin:     General: Skin is warm and dry  Neurological:      Mental Status: He is alert and oriented to person, place, and time     Psychiatric:         Behavior: Behavior normal

## 2022-03-31 NOTE — ASSESSMENT & PLAN NOTE
New onset atrial flutter with RVR discovered 1/2021 of unclear duration  Chads Vasc 2 on Eliquis 5 mg BID for anticoagulation  Patient is s/p BONG guided cardioversion 1/4/2021  BONG 1/4/2021 EF 55-60% without significant valvular abnormalities  Recommend 14 day ZIO monitor which was ordered at last office visit  Needs sleep study to rule out CADY which is scheduled for 4/8/2021  Will plan exercise stress test to rule out underlying ischemia  I have asked him to reduce his caffeine intake which he has done  Metoprolol tartrate 75 mg BID was decreased to 50 mg BID at last office visit due to fatigue and bradycardia on ECG (47 bpm)  Heart rate improved on exam today  Will await ZIO report  Continue magnesium 250 mg daily  ECG 1/12/2021 showed sinus bradycardia at 47 bpm    Low threshold for EP evaluation and atrial flutter ablation

## 2022-03-31 NOTE — ASSESSMENT & PLAN NOTE
Lipid panel 7/29/2021: C 187  T 217  H 31  L 113  Lipid panel 7/28/2020: C 232  T 303  H 35  L 136  Repeat lipid panel 07/2021 showed improved LDL from 136 to 113  Would recommend continued dietary modification to start but patient likely would benefit from the addition of statin if cholesterol remains elevated  10 year ASCVD risk 26 1%  Will discuss addition of statin therapy at follow up given his ASCVD risk

## 2022-03-31 NOTE — ASSESSMENT & PLAN NOTE
New onset atrial flutter with RVR discovered 1/2021 of unclear duration  Chads Vasc 2 on Eliquis 5 mg BID for anticoagulation  Patient is s/p BONG guided cardioversion 1/4/2021  BONG 1/4/2021 EF 55-60% without significant valvular abnormalities  14 day ZIO monitor notable for 178 episodes of SVT with the longest lasting 16 beats  Average heart rate 48 bpm   Unfortunately we are unable to up titrate beta-blocker due to an average heart rate of 48 bpm   Sleep study 4/8/2021 showed severe sleep apnea with severe sleep fragmentation and sleep maintenance insomnia  Patient has since started CPAP  I would recommend repeat 14 day ZIO at follow-up  Exercise stress test 04/23/2021 showed no evidence of ischemia (3 beat run of NSVT)  Frequent PACs and short burst of probable atrial tachycardia was noted  I had asked him to reduce his caffeine intake which he has done  Continue metoprolol tartrate 50 mg BID  Average heart rate on ZIO was 48 bpm    We may need to further down titrate metoprolol tartrate  Will plan repeat ZIO once patient is started on CPAP  Continue magnesium 250 mg daily  ECG 1/12/2021 showed sinus bradycardia at 47 bpm    Low threshold for EP evaluation and atrial flutter ablation

## 2022-03-31 NOTE — ASSESSMENT & PLAN NOTE
Lipid panel 7/28/2020: C 232  T 303  H 35  L 136  Would recommend dietary modification to start but patient likely would benefit from the addition of statin if cholesterol remains elevated  Will plan repeat lipid panel 7/2021

## 2022-03-31 NOTE — ASSESSMENT & PLAN NOTE
Patient was bradycardic on exam 1/12/2021 with heart rate of 47 bpm   Metoprolol tartrate 50 mg twice daily  14 day ZIO 5/2021 showed an average heart rate of 48 bpm ()  Heart rate remains low today (49 bpm)  He does note some fatigue however he denies any lightheadedness or dizziness  We may need to continue down titration of metoprolol tartrate  Will plan repeat monitoring at follow up

## 2022-03-31 NOTE — ASSESSMENT & PLAN NOTE
Patient was diagnosed with severe obstructive sleep apnea on sleep study 04/08/2021  Patient did start CPAP and feels like he is sleeping better  He will continue follow-up with sleep medicine  He is aware of  the importance of treating sleep apnea

## 2022-03-31 NOTE — ASSESSMENT & PLAN NOTE
Blood pressure remains elevated on exam despite the addition of losartan which has been up titrated to 100 mg daily  Will continue metoprolol tartrate 50 mg BID  Recommend adding amlodipine 2 5 mg daily  Will plan blood pressure check in the office and continue up titration as needed

## 2022-03-31 NOTE — ASSESSMENT & PLAN NOTE
Patient was bradycardic on exam 1/12/2021 with heart rate of 47 bpm   I had recommended decreasing metoprolol tartrate 75 mg twice daily to 50 mg twice daily  14 day ZIO was ordered but not yet completed  Heart rate improved today (64 bpm)

## 2022-03-31 NOTE — ASSESSMENT & PLAN NOTE
Blood pressure remains elevated (but improved) on exam despite losartan 100 mg daily, amlodipine 5 mg daily and metoprolol tartrate 50 mg twice daily  I have made no changes to his BP medications today  I have recommended 10 lb weight loss  I am hoping that with consistent CPAP use his blood pressure will improve  If blood pressure remains elevated at follow-up would recommend the addition of HCTZ 25 mg daily

## 2022-03-31 NOTE — ASSESSMENT & PLAN NOTE
Patient is bradycardic on exam today with heart rate of 47 bpm   I have recommended decreasing metoprolol tartrate 75 mg twice daily to 50 mg twice daily  14 day Zio has been ordered

## 2022-05-23 DIAGNOSIS — I48.92 PAROXYSMAL ATRIAL FLUTTER (HCC): ICD-10-CM

## 2022-05-23 RX ORDER — CARBOXYMETHYLCELLULOSE SODIUM 0.5 %
250 DROPPERETTE, SINGLE-USE DROP DISPENSER OPHTHALMIC (EYE) DAILY
Qty: 90 TABLET | Refills: 3 | Status: SHIPPED | OUTPATIENT
Start: 2022-05-23

## 2022-05-23 NOTE — TELEPHONE ENCOUNTER
Pt called and said that he needs a refill on his Magnesium called into his [facility] in [place]    If there is a problem please call pt at [phone]

## 2022-06-30 ENCOUNTER — OFFICE VISIT (OUTPATIENT)
Dept: CARDIOLOGY CLINIC | Facility: CLINIC | Age: 67
End: 2022-06-30
Payer: COMMERCIAL

## 2022-06-30 VITALS
WEIGHT: 237.6 LBS | SYSTOLIC BLOOD PRESSURE: 130 MMHG | OXYGEN SATURATION: 98 % | HEART RATE: 55 BPM | HEIGHT: 72 IN | BODY MASS INDEX: 32.18 KG/M2 | DIASTOLIC BLOOD PRESSURE: 78 MMHG

## 2022-06-30 DIAGNOSIS — E78.2 MIXED HYPERLIPIDEMIA: ICD-10-CM

## 2022-06-30 DIAGNOSIS — G47.33 OSA (OBSTRUCTIVE SLEEP APNEA): ICD-10-CM

## 2022-06-30 DIAGNOSIS — R00.1 BRADYCARDIA: ICD-10-CM

## 2022-06-30 DIAGNOSIS — I48.92 PAROXYSMAL ATRIAL FLUTTER (HCC): Primary | ICD-10-CM

## 2022-06-30 DIAGNOSIS — I10 ESSENTIAL HYPERTENSION: ICD-10-CM

## 2022-06-30 PROCEDURE — 99214 OFFICE O/P EST MOD 30 MIN: CPT | Performed by: NURSE PRACTITIONER

## 2022-06-30 NOTE — PATIENT INSTRUCTIONS
Look into [facility] for CPAP supply  Your heart rate remains in the mid 50's today  Please monitor heart rate and BP periodically, and notify us if HR persistently < 50 or BP persistently > 140/90  Lab orders provided, bring to PCP office, if he does the same orders just send us a copy

## 2022-06-30 NOTE — ASSESSMENT & PLAN NOTE
Lipid panel 7/29/2021:     HDL 31    Goal LDL < 100  He has recently made dietary modifications and continues to follow them  Provided updated lipid panel order to complete this month

## 2022-06-30 NOTE — ASSESSMENT & PLAN NOTE
New onset atrial flutter with RVR discovered 1/2021, unclear duration  Chads Vasc 2 on Eliquis 5 mg BID for anticoagulation  Patient is s/p BONG guided cardioversion 1/4/2021  BONG 1/4/2021 EF 55-60% without significant valvular abnormalities  14 day ZIO monitor 5/2021 revealed avg HR 48 bpm with 178 episodes of SVT, longest 16 beats  Positive sleep study and now using CPAP  Exercise stress test 4/23/2021 was negative for ischemia or exercise induced AF  He had and continues to reduce his caffeine intake  24 hour Holter monitor 3/3/22 showed SB with avg HR 49 bpm, 2 short runs of atrial tachycardia  Metoprolol tartrate reduced from 50 to 25 mg BID  HR continues to be in mid-50's today  Patient is without symptoms  Low threshold for EP evaluation and atrial flutter ablation  Discussed [address] monitoring device as well as iWatch which may be helpful in monitoring for recurrent atrial flutter as the patient was asymptomatic with rapid rates

## 2022-06-30 NOTE — ASSESSMENT & PLAN NOTE
Compliant with CPAP  Follows with Dr Miley Armendariz with [facility] sleep medicine

## 2022-06-30 NOTE — PROGRESS NOTES
Cardiology Follow Up    Shirin Ervin  1955  [id number]  [facility] Juan A  [address]  [address] [facility] [address] [facility]  [phone]    Troy Leal presents for routine follow up of paroxysmal atrial fibrillation, hypertension and hyperlipidemia  1  Paroxysmal atrial flutter [facility])  Assessment & Plan:  New onset atrial flutter with RVR discovered 1/2021, unclear duration  Chads Vasc 2 on Eliquis 5 mg BID for anticoagulation  Patient is s/p BONG guided cardioversion 1/4/2021  BONG 1/4/2021 EF 55-60% without significant valvular abnormalities  14 day ZIO monitor 5/2021 revealed avg HR 48 bpm with 178 episodes of SVT, longest 16 beats  Positive sleep study and now using CPAP  Exercise stress test 4/23/2021 was negative for ischemia or exercise induced AF  He had and continues to reduce his caffeine intake  24 hour Holter monitor 3/3/22 showed SB with avg HR 49 bpm, 2 short runs of atrial tachycardia  Metoprolol tartrate reduced from 50 to 25 mg BID  HR continues to be in mid-50's today  Patient is without symptoms  Low threshold for EP evaluation and atrial flutter ablation  Discussed [address] monitoring device as well as iWatch which may be helpful in monitoring for recurrent atrial flutter as the patient was asymptomatic with rapid rates  2  Bradycardia  Assessment & Plan:  Bradycardia noted on ECG today 2/24/2022 as well as on ZIO 5/2021 which showed an average heart rate of 48 bpm    24 hour Holter monitor 3/3/22 shows avg HR 49 bpm   See discussion under atrial flutter  Patient denies any symptoms  3  Essential hypertension  Assessment & Plan:  Blood pressure at goal   Continue amlodipine 5 mg daily, HCTZ 25 mg daily, losartan 100 mg daily, and metoprolol tartrate 25 mg BID  Lab work reviewed  Discussed benefits of weight loss, 2 g sodium diet, and regular exercise        4  Mixed hyperlipidemia  Assessment & Plan:  Lipid panel 7/29/2021:     HDL 31    Goal LDL < 100  He has recently made dietary modifications and continues to follow them  Provided updated lipid panel order to complete this month  Orders:  -     Lipid panel; Future  -     Comprehensive metabolic panel    5  CADY (obstructive sleep apnea)  Assessment & Plan:  Compliant with CPAP  Follows with Dr Bhupinder Higgins with [facility] sleep medicine  HPI   Sally Esquivel has a past medical history of paroxysmal atrial fibrillation, HTN, HLD, and CADY on CPAP  He established with cardiology during a hospitalization to [address] [place] [address] 1/2-1/5/2021 when he presented with rapid atrial flutter  He had gone to a local urgent care for urinary symptoms where he was diagnosed with UTI, but also found to be in atrial flutter with HR's in the 130's  He underwent BONG guided cardioversion on 1/4/21 and was converted to sinus rhythm  He was discharged home on Eliquis 5 mg BID and metoprolol tartrate 75 mg BID      Exercise stress test 4/23/2021 showed no evidence of ischemia (full details below)  He had a ZIO monitor 5/2021 with avg HR 48 bpm and 178 episodes of SVT with the longest run 16 beats       He was subsequently diagnosed with CADY and started CPAP therapy      He was seen by me on 11/18/2021 at which time HCTZ was added for more optimal blood pressure control  He followed up most recently with Dr Julius Helms on 2/24/2022  He was feeling well from a cardiac standpoint  ECG at the visit showed SB with HR 52 bpm  A 24 hour Holter monitor was ordered      24 hour Holter monitor 3/3/2022 showed SB with avg HR 49 bpm, HR   2 short runs of asymptomatic atrial tachycardia were noted  His metoprolol tartrate was reduced from 50 to 25 mg BID     6/30/2022: Sally Esquivel presents today for routine follow up  We reviewed the results of his holter monitor  He states he is tolerating the reduced dose of metoprolol tartrate 25 mg BID  He denies cardiac complaints   No chest pain, shortness of breath, lightheadedness, palpitations, or edema  He remains active and is tolerating exercise well  No excessive fatigue  He is using his CPAP faithfully  He has a res-med machine  He is not monitoring his BP or HR/rhythm at home  His is excited as his daughter recently gave birth to a son      Medical Problems     Problem List     Paroxysmal atrial flutter (HCC)    Essential hypertension    Mixed hyperlipidemia    CADY (obstructive sleep apnea)    Acute cystitis without hematuria    Coagulation disorder (HCC)    Bradycardia        Past Medical History:   Diagnosis Date    Atrial flutter (HCC)     Hypertension     Mixed hyperlipidemia     CADY (obstructive sleep apnea)      Social History     Socioeconomic History    Marital status: /Civil Union     Spouse name: Not on file    Number of children: Not on file    Years of education: Not on file    Highest education level: Not on file   Occupational History    Occupation: Retired     Occupation:      Comment: Works part time since retiring   Tobacco Use    Smoking status: Never Smoker    Smokeless tobacco: Never Used   Vaping Use    Vaping Use: Never used   Substance and Sexual Activity    Alcohol use: Not Currently    Drug use: Never    Sexual activity: Not on file     Comment: Defer   Other Topics Concern    Not on file   Social History Narrative    Not on file     Social Determinants of Health     Financial Resource Strain: Not on file   Food Insecurity: Not on file   Transportation Needs: Not on file   Physical Activity: Not on file   Stress: Not on file   Social Connections: Not on file   Intimate Partner Violence: Not on file   Housing Stability: Not on file      Family History   Problem Relation Age of Onset    No Known Problems Mother     Hypertension Father      Past Surgical History:   Procedure Laterality Date    CARDIOVERSION  01/04/2021    TONSILLECTOMY  age 5   [address] [facility] EXTRACTION         Current Outpatient Medications:     amLODIPine (NORVASC) 5 mg tablet, Take 1 tablet (5 mg total) by mouth daily, Disp: 90 tablet, Rfl: 3    [facility] Magnesium Oxide 250 MG TABS, Take 1 tablet (250 mg total) by mouth in the morning , Disp: 90 tablet, Rfl: 3    Eliquis 5 MG, TAKE 1 TABLET BY MOUTH TWICE A DAY, Disp: 60 tablet, Rfl: 11    hydrochlorothiazide (HYDRODIURIL) 25 mg tablet, Take 1 tablet (25 mg total) by mouth daily, Disp: 30 tablet, Rfl: 11    losartan (COZAAR) 100 MG tablet, Take 1 tablet (100 mg total) by mouth daily, Disp: 90 tablet, Rfl: 3    metoprolol tartrate (LOPRESSOR) 25 mg tablet, Take 1 tablet (25 mg total) by mouth every 12 (twelve) hours, Disp: 180 tablet, Rfl: 3  No Known Allergies    Labs:     Chemistry        Component Value Date/Time    K 4 3 01/04/2021 0412     01/04/2021 0412    CO2 27 01/04/2021 0412    BUN 19 01/04/2021 0412    CREATININE 1 18 01/04/2021 0412        Component Value Date/Time    CALCIUM 8 9 01/04/2021 0412    ALKPHOS 56 01/03/2021 0515    AST 16 01/03/2021 0515    ALT 39 01/03/2021 0515        Cardiac Test Results:   24 hour Holter monitor 3/3/2022:  Predominantly sinus bradycardia, HR , avg 49 bpm   7 PVC's  344 PAC's  Longest R-R 2 4 seconds  2 short runs of AT  No symptoms      ECG 2/24/2022: Sinus bradycardia at 52 bpm  IVCD  Non specific ST abnormality       Lipid panel 7/29/2021: C 187  T 217  H 31  L 113       ZIO 5/6-5/20/2021:  Min HR 32  Max   Avg HR 48     One 4 beat run of VT  178 episodes of SVT with the longest lasting 16 beats  Occasional PAC's  Rare PVC's       Exercise stress test 4/23/2021:  Artur protocol  8:04  METs 10 1  Frequent PAC's with short burst of probable AT and occasional couplet PVC's and one 3 beat run NSVT  No evidence of ischemia      BONG 1/4/2021:  EF 55-60%  Mild LVH  Left and right atrium mildly dilated     Mild spontaneous echo contrast without evidence of left atrial appendage thrombus  No ASD or PFO with bubble study  Trace to mild MR  Trace to mild TR  Review of Systems   Constitutional: Negative  Negative for malaise/fatigue  HENT: Negative  Cardiovascular: Negative for chest pain, dyspnea on exertion, irregular heartbeat, leg swelling, near-syncope, orthopnea and palpitations  Respiratory: Negative for cough and snoring  Endocrine: Negative  Skin: Negative  Musculoskeletal: Negative  Gastrointestinal: Negative  Genitourinary: Negative  Neurological: Negative  Negative for light-headedness  Psychiatric/Behavioral: Negative  Vitals:    06/30/22 0957   BP: 130/78   Pulse: 55   SpO2:      Vitals:    06/30/22 0928   Weight: 108 kg (237 lb 9 6 oz)     Height: 6' (182 9 cm)   Body mass index is 32 22 kg/m²  Physical Exam  Vitals and nursing note reviewed  Constitutional:       General: He is not in acute distress  Appearance: He is well-developed  He is obese  He is not diaphoretic  HENT:      Head: Normocephalic and atraumatic  Neck:      Vascular: No carotid bruit or JVD  Cardiovascular:      Rate and Rhythm: Regular rhythm  Bradycardia present  No extrasystoles are present  Pulses: Intact distal pulses  Heart sounds: Normal heart sounds, S1 normal and S2 normal  No murmur heard  No friction rub  No gallop  Comments: No lower extremity edema  Pulmonary:      Effort: Pulmonary effort is normal  No respiratory distress  Breath sounds: Normal breath sounds  Abdominal:      General: There is no distension  Palpations: Abdomen is soft  Tenderness: There is no abdominal tenderness  Skin:     General: Skin is warm and dry  Findings: No rash  Neurological:      Mental Status: He is alert and oriented to person, place, and time  Psychiatric:         Mood and Affect: Mood and affect normal          Speech: Speech normal          Behavior: Behavior normal  Behavior is cooperative           Cognition and Memory: Cognition and memory normal

## 2022-06-30 NOTE — ASSESSMENT & PLAN NOTE
Blood pressure at goal   Continue amlodipine 5 mg daily, HCTZ 25 mg daily, losartan 100 mg daily, and metoprolol tartrate 25 mg BID  Lab work reviewed  Discussed benefits of weight loss, 2 g sodium diet, and regular exercise

## 2022-06-30 NOTE — ASSESSMENT & PLAN NOTE
Bradycardia noted on ECG today 2/24/2022 as well as on ZIO 5/2021 which showed an average heart rate of 48 bpm    24 hour Holter monitor 3/3/22 shows avg HR 49 bpm   See discussion under atrial flutter  Patient denies any symptoms

## 2022-09-22 DIAGNOSIS — I10 ESSENTIAL HYPERTENSION: ICD-10-CM

## 2022-09-23 ENCOUNTER — OFFICE VISIT (OUTPATIENT)
Dept: CARDIOLOGY CLINIC | Facility: CLINIC | Age: 67
End: 2022-09-23
Payer: COMMERCIAL

## 2022-09-23 VITALS
WEIGHT: 236 LBS | HEIGHT: 72 IN | DIASTOLIC BLOOD PRESSURE: 72 MMHG | SYSTOLIC BLOOD PRESSURE: 128 MMHG | BODY MASS INDEX: 31.97 KG/M2 | OXYGEN SATURATION: 92 % | HEART RATE: 64 BPM

## 2022-09-23 DIAGNOSIS — R00.1 BRADYCARDIA: ICD-10-CM

## 2022-09-23 DIAGNOSIS — G47.33 OSA (OBSTRUCTIVE SLEEP APNEA): ICD-10-CM

## 2022-09-23 DIAGNOSIS — I10 ESSENTIAL HYPERTENSION: ICD-10-CM

## 2022-09-23 DIAGNOSIS — E78.2 MIXED HYPERLIPIDEMIA: ICD-10-CM

## 2022-09-23 DIAGNOSIS — I48.92 PAROXYSMAL ATRIAL FLUTTER (HCC): Primary | ICD-10-CM

## 2022-09-23 PROCEDURE — 99214 OFFICE O/P EST MOD 30 MIN: CPT | Performed by: INTERNAL MEDICINE

## 2022-09-23 RX ORDER — AMLODIPINE BESYLATE 5 MG/1
TABLET ORAL
Qty: 90 TABLET | Refills: 3 | Status: SHIPPED | OUTPATIENT
Start: 2022-09-23

## 2022-09-23 RX ORDER — LOSARTAN POTASSIUM 100 MG/1
TABLET ORAL
Qty: 90 TABLET | Refills: 3 | Status: SHIPPED | OUTPATIENT
Start: 2022-09-23

## 2022-09-23 NOTE — PATIENT INSTRUCTIONS
Continue current medications without change  Call me with any change in symptoms  Up to date with testing

## 2022-09-23 NOTE — PROGRESS NOTES
Cardiology Office Visit    Isabella Nuñez  [id number]  1955    [facility] [address]  [facility] [address] [phone]      Dear J Carlos Berman MD,    I had the pleasure of seeing your [facility] office today 9/23/2022  As you know he is a pleasant 75y o  year old male with a medical history as described below  Reason for office visit: Follow up paroxysmal atrial fibrillation, hypertension and hyperlipidemia  1  Paroxysmal atrial flutter ([address] )    2  Essential hypertension    3  Mixed hyperlipidemia    4  Bradycardia    5  CADY (obstructive sleep apnea)       Plan:  Continue current medications without change  Instructed to call me with any change in symptoms  Up to date with testing  HPI     Mariangel Kendall has a past medical history of paroxysmal atrial fibrillation, HTN, HLD, and CADY now on CPAP      Mariangel Kendall presented to [address] [place] [address] on 1/2/2021 following an urgent care visit for urinary symptoms  He was diagnosed with a UTI, but also found to be in atrial flutter with RVR  HR was in the 130's upon presentation  He was started on diltiazem drip and was given cardiazem IV as well as metoprolol with minimal response  He underwent BONG guided cardioversion on 1/4/2021 and converted to sinus rhythm  He was discharged on Eliquis  5 mg twice daily and metoprolol tartrate 75mg BID      Following hospital discharge he did undergo an exercise stress test 4/23/2021 which showed no evidence of ischemia (full details below)  He had a ZIO monitor 5/2021 with avg HR 48 bpm and 178 episodes of SVT with the longest run 16 beats       He was subsequently diagnosed with CADY and started CPAP therapy  Patient was last seen in the office by Kapil Steel 11/18/2021 at which time he was doing well overall  He noted improvement in energy levels with CPAP use  HCTZ was added for more optimal blood pressure control  2/24/2022: Patient returns to the office today for follow up  He did lose some weight but has gained back some  He is walking every day up to 3 5 miles depending on the day  He is also using exercise bands to work out  No chest pain  No shortness of breath  No lightheadedness or dizziness  No palpitations  ECG today shows sinus bradycardia at 52 BPM      * Patient was seen by Kapil Steel 6/30/2022 9/23/2022: Patient returns to the office today for follow up  He is walking several days a week  He is using CPAP fairly consistently  No chest pain  No shortness of breath  No edema  No palpitations  Reviewed lipid panel 8/11/2022  Triglycerides 345  Holter 3/3/2022 showed an average heart rate of 49  He feels his energy levels are good       Patient Active Problem List   Diagnosis    Paroxysmal atrial flutter (HCC)    Acute cystitis without hematuria    Essential hypertension    Coagulation disorder ([facility] )    CADY (obstructive sleep apnea)    Mixed hyperlipidemia    Bradycardia     Past Medical History:   Diagnosis Date    Atrial flutter ([place] [address] )     Hypertension     Mixed hyperlipidemia     CADY (obstructive sleep apnea)      Social History     Socioeconomic History    Marital status: /Civil Union     Spouse name: Not on file    Number of children: Not on file    Years of education: Not on file    Highest education level: Not on file   Occupational History    Occupation: Retired     Occupation:      Comment: Works part time since retiring   Tobacco Use    Smoking status: Never Smoker    Smokeless tobacco: Never Used   Vaping Use    Vaping Use: Never used   Substance and Sexual Activity    Alcohol use: Not Currently    Drug use: Never    Sexual activity: Not on file     Comment: Defer   Other Topics Concern    Not on file   Social History Narrative    Not on file     Social Determinants of Health     Financial Resource Strain: Not on file   Food Insecurity: Not on file   Transportation Needs: Not on file   Physical Activity: Not on file   Stress: Not on file   Social Connections: Not on file   Intimate Partner Violence: Not on file   Housing Stability: Not on file      Family History   Problem Relation Age of Onset    No Known Problems Mother     Hypertension Father      Past Surgical History:   Procedure Laterality Date    CARDIOVERSION  01/04/2021    TONSILLECTOMY  age 5   Earna Sandra [de-identified] TOOTH EXTRACTION         Current Outpatient Medications:     amLODIPine (NORVASC) 5 mg tablet, Take 1 tablet (5 mg total) by mouth daily, Disp: 90 tablet, Rfl: 3    [facility] Magnesium Oxide 250 MG TABS, Take 1 tablet (250 mg total) by mouth in the morning , Disp: 90 tablet, Rfl: 3    Eliquis 5 MG, TAKE 1 TABLET BY MOUTH TWICE A DAY, Disp: 60 tablet, Rfl: 11    hydrochlorothiazide (HYDRODIURIL) 25 mg tablet, Take 1 tablet (25 mg total) by mouth daily, Disp: 30 tablet, Rfl: 11    losartan (COZAAR) 100 MG tablet, Take 1 tablet (100 mg total) by mouth daily, Disp: 90 tablet, Rfl: 3    metoprolol tartrate (LOPRESSOR) 25 mg tablet, Take 1 tablet (25 mg total) by mouth every 12 (twelve) hours, Disp: 180 tablet, Rfl: 3  No Known Allergies      Cardiac Test Results:     ECG 2/24/2022: Sinus bradycardia at 52 bpm  IVCD  Non specific ST abnormality  Lipid panel 7/29/2021: C 187  T 217  H 31  L 113  ZIO 5/6-5/20/2021:  Min HR 32  Max   Avg HR 48     One 4 beat run of VT  178 episodes of SVT with the longest lasting 16 beats  Occasional PAC's  Rare PVC's       Exercise stress test 4/23/2021:  Artur protocol  8:04  METs 10 1  Frequent PAC's with short burst of probable AT and occasional couplet PVC's and one 3 beat run NSVT  No evidence of ischemia  BONG 1/4/2021:  EF 55-60%  Mild LVH  Left and right atrium mildly dilated  Mild spontaneous echo contrast without evidence of left atrial appendage thrombus  No ASD or PFO with bubble study  Trace to mild MR   Trace to mild TR     Review of Systems:    Review of Systems   Constitutional: Positive for fatigue (improved)  Negative for activity change and appetite change  HENT: Negative for congestion, hearing loss, tinnitus and trouble swallowing  Eyes: Negative for visual disturbance  Respiratory: Negative for cough, chest tightness, shortness of breath and wheezing  Cardiovascular: Negative for chest pain, palpitations and leg swelling  Gastrointestinal: Negative for abdominal distention, abdominal pain, nausea and vomiting  Genitourinary: Negative for difficulty urinating  Musculoskeletal: Negative for arthralgias  Skin: Negative for rash  Neurological: Negative for dizziness, syncope and light-headedness  Hematological: Does not bruise/bleed easily  Psychiatric/Behavioral: Negative for confusion  The patient is not nervous/anxious  All other systems reviewed and are negative  Vitals:    09/23/22 1040   BP: 144/70   Pulse: 64   SpO2: 92%   Weight: 107 kg (236 lb)   Height: 6' (1 829 m)     Vitals:    09/23/22 1040   Weight: 107 kg (236 lb)     Height: 6' (182 9 cm)     Physical Exam  Vitals reviewed  Constitutional:       Appearance: He is well-developed  HENT:      Head: Normocephalic and atraumatic  Eyes:      Conjunctiva/sclera: Conjunctivae normal       Pupils: Pupils are equal, round, and reactive to light  Neck:      Vascular: No JVD  Cardiovascular:      Rate and Rhythm: Regular rhythm  Bradycardia present  Heart sounds: Normal heart sounds  No murmur heard  No friction rub  No gallop  Pulmonary:      Effort: Pulmonary effort is normal       Breath sounds: Normal breath sounds  Abdominal:      General: Bowel sounds are normal       Palpations: Abdomen is soft  Musculoskeletal:      Cervical back: Normal range of motion  Skin:     General: Skin is warm and dry  Neurological:      Mental Status: He is alert and oriented to person, place, and time     Psychiatric: Behavior: Behavior normal

## 2022-09-29 ENCOUNTER — OFFICE VISIT (OUTPATIENT)
Dept: SLEEP CENTER | Facility: CLINIC | Age: 67
End: 2022-09-29
Payer: COMMERCIAL

## 2022-09-29 VITALS
SYSTOLIC BLOOD PRESSURE: 142 MMHG | HEIGHT: 72 IN | BODY MASS INDEX: 32.37 KG/M2 | DIASTOLIC BLOOD PRESSURE: 80 MMHG | HEART RATE: 62 BPM | WEIGHT: 239 LBS | TEMPERATURE: 97.6 F | OXYGEN SATURATION: 96 %

## 2022-09-29 DIAGNOSIS — I10 ESSENTIAL HYPERTENSION: ICD-10-CM

## 2022-09-29 DIAGNOSIS — G47.33 OSA (OBSTRUCTIVE SLEEP APNEA): Primary | ICD-10-CM

## 2022-09-29 DIAGNOSIS — G47.9 SLEEP DISTURBANCE: ICD-10-CM

## 2022-09-29 DIAGNOSIS — E66.9 OBESITY (BMI 30-39.9): ICD-10-CM

## 2022-09-29 DIAGNOSIS — I48.92 PAROXYSMAL ATRIAL FLUTTER (HCC): ICD-10-CM

## 2022-09-29 DIAGNOSIS — R40.0 DAYTIME SLEEPINESS: ICD-10-CM

## 2022-09-29 DIAGNOSIS — R09.81 NASAL CONGESTION: ICD-10-CM

## 2022-09-29 PROCEDURE — 99214 OFFICE O/P EST MOD 30 MIN: CPT | Performed by: INTERNAL MEDICINE

## 2022-09-29 NOTE — PATIENT INSTRUCTIONS

## 2022-09-29 NOTE — PROGRESS NOTES
Follow-Up Note - Sleep Center   Janelle Ling  75 y o  male  :1955  [facility]:[id number]  ZBB:    CC: I saw this patient for follow-up in clinic today for Sleep disordered breathing, Coexisting Sleep and Medical Problems  He is using a loaner ResMed machine    PFSH, Problem List, Medications & Allergies were reviewed in EMR  Interval changes: none reported  He  has a past medical history of Atrial flutter ([address] ), Hypertension, Mixed hyperlipidemia, and CADY (obstructive sleep apnea)  He has a current medication list which includes the following prescription(s): amlodipine, [facility] magnesium oxide, eliquis, hydrochlorothiazide, losartan, and metoprolol tartrate  PHYSIOLOGICAL DATA REVIEW AND INTERPRETATION:    using PAP > 4 hours/night 87%  Estimated VERONIKA 3 6/hour with pressure of 15 3cm H2O @90th/95th percentile; Patient has not been using ozone based devices to sanitize the machine  SUBJECTIVE: Regarding use of PAP, Royal Silverman reports:   · some adverse effects: mask leaks ; has not noticed any fibres or foreign material in air line  · He is benefiting from use: sleeping better   Sleep Routine: Royal Silverman reports getting 5-8 hrs sleep  ; he has no difficulty initiating or maintaining sleep   He arises needing an alarm and feels refreshed  Royal Silverman denies Excessive Daytime Sleepiness,   He rated himself at Total score: 4 /24 on the [facility] Sleepiness Scale  Habits: reports that he has never smoked  He has never used smokeless tobacco ,  reports previous alcohol use ,  reports no history of drug use , Caffeine use:limited ; Exercise routine: regular    ROS: reviewed & as attached  Significant for weight has been stable  He reported nasal is improved when he uses CPAP  He reported no respiratory or cardiac symptoms  Amy Blend      EXAM: /80 (BP Location: Left arm, Cuff Size: Large)   Pulse 62   Temp 97 6 °F (36 4 °C) (Temporal)   Ht 6' (1 829 m)   Wt 108 kg (239 lb)   SpO2 96%   BMI 32 41 kg/m²     Wt Readings from Last 3 Encounters:   09/29/22 108 kg (239 lb)   09/23/22 107 kg (236 lb)   06/30/22 108 kg (237 lb 9 6 oz)      Patient is well groomed; well appearing  CNS: Alert, orientated, clear & coherent speech  Psych: cooperativeand in no distress  Mental state:appears normal   H&N: EOMI; NC/AT:no facial pressure marks, no rashes  Skin/Extrem: col & hydration normal; no edema  Resp: Respiratory effort is normal  Physical findings otherwise essentially unchanged from previous  IMPRESSION: Problem List Items & Comorbidities Addressed this Visit    1  CADY (obstructive sleep apnea)  PAP DME Resupply/Reorder   2  Sleep disturbance     3  Daytime sleepiness     4  Nasal congestion     5  Essential hypertension     6  Paroxysmal atrial flutter (HCC)     7  Obesity (BMI 30-39 9)     1-4 improved    PLAN:  1  I reviewed results of prior studies and physiologic data with the patient  2  I discussed treatment options with risks and benefits  3  Treatment with  PAP is medically necessary and Ernie Boyce is agreable to continue use  4  Care of equipment, methods to improve comfort using PAP and importance of compliance with therapy were discussed  5  Pressure setting: continue 10-17 cmH2O     6  Rx provided to replace  supplies and Care coordinated with DME provider  7  Discussed strategies for weight reduction  8  Follow-up is advised in 1 year or sooner if needed to monitor progress, compliance and to adjust therapy  Thank you for allowing me to participate in the care of this patient  Sincerely,     Authenticated electronically on 24/02/29   Board Certified Specialist     Portions of the record may have been created with voice recognition software  Occasional wrong word or "sound a like" substitutions may have occurred due to the inherent limitations of voice recognition software  There may also be notations and random deletions of words or characters from malfunctioning software   Read the chart carefully and recognize, using context, where substitutions/deletions have occurred

## 2022-09-30 ENCOUNTER — TELEPHONE (OUTPATIENT)
Dept: SLEEP CENTER | Facility: CLINIC | Age: 67
End: 2022-09-30

## 2022-09-30 NOTE — TELEPHONE ENCOUNTER
Rx for PAP resupply and office note sent to [address] [place] [address] Patient (Will Cortés) via [address] [place] [address]

## 2022-10-04 LAB

## 2022-10-12 PROBLEM — N30.00 ACUTE CYSTITIS WITHOUT HEMATURIA: Status: RESOLVED | Noted: 2021-01-02 | Resolved: 2022-10-12

## 2022-11-07 DIAGNOSIS — I10 ESSENTIAL HYPERTENSION: ICD-10-CM

## 2022-11-07 RX ORDER — HYDROCHLOROTHIAZIDE 25 MG/1
25 TABLET ORAL DAILY
Qty: 30 TABLET | Refills: 11 | Status: SHIPPED | OUTPATIENT
Start: 2022-11-07

## 2022-11-07 NOTE — TELEPHONE ENCOUNTER
Pt needs refill for hydrochlorothiazide 25 mg sent to [facility] in [address]

## 2023-01-24 ENCOUNTER — TELEPHONE (OUTPATIENT)
Dept: OTHER | Facility: OTHER | Age: 68
End: 2023-01-24

## 2023-01-24 NOTE — TELEPHONE ENCOUNTER
Patient called saying that the [facility] pharmacy told him that they need to hear from [address], DO before they can refill his Eliquis 5 mg, patient is asking if Dr Francisco J Dumont can give the pharmacy a call regarding his medication

## 2023-01-25 ENCOUNTER — TELEPHONE (OUTPATIENT)
Dept: CARDIOLOGY CLINIC | Facility: CLINIC | Age: 68
End: 2023-01-25

## 2023-01-25 NOTE — TELEPHONE ENCOUNTER
Call to pharmacy, spoke to pharmacist  She stated that prior auth is needed for eliquis  Prior auth initiated

## 2023-01-26 ENCOUNTER — TELEPHONE (OUTPATIENT)
Dept: CARDIOLOGY CLINIC | Facility: CLINIC | Age: 68
End: 2023-01-26

## 2023-01-26 NOTE — TELEPHONE ENCOUNTER
Renewal for Eliquis submitted and approved  Case # [id number]   Call to patient, notified of approval

## 2023-02-23 DIAGNOSIS — I48.92 ATRIAL FLUTTER (HCC): ICD-10-CM

## 2023-02-23 NOTE — TELEPHONE ENCOUNTER
Pt waqar stating that he needs a refill on his Eliquis called into [facility] please call pt when that is done [phone]

## 2023-03-04 ENCOUNTER — TELEPHONE (OUTPATIENT)
Dept: OTHER | Facility: OTHER | Age: 68
End: 2023-03-04

## 2023-03-04 DIAGNOSIS — I48.92 PAROXYSMAL ATRIAL FLUTTER (HCC): ICD-10-CM

## 2023-03-04 NOTE — TELEPHONE ENCOUNTER
PT  Called in to notify he was told by pharmacy he is unable to get his metoprolol medication until they hear from his doctor   Please follow up with PT

## 2023-03-06 ENCOUNTER — TELEPHONE (OUTPATIENT)
Dept: CARDIOLOGY CLINIC | Facility: CLINIC | Age: 68
End: 2023-03-06

## 2023-03-06 RX ORDER — CARBOXYMETHYLCELLULOSE SODIUM 0.5 %
250 DROPPERETTE, SINGLE-USE DROP DISPENSER OPHTHALMIC (EYE) DAILY
Qty: 90 TABLET | Refills: 3 | Status: SHIPPED | OUTPATIENT
Start: 2023-03-06

## 2023-03-06 NOTE — TELEPHONE ENCOUNTER
Pt waqar stating that he needs a refill on his metoprolol called into [facility]  Please call pt [phone]**Also needs magnesium refilled    Please call pt he only has 1 pill left

## 2023-05-09 ENCOUNTER — TELEPHONE (OUTPATIENT)
Dept: CARDIOLOGY CLINIC | Facility: CLINIC | Age: 68
End: 2023-05-09

## 2023-05-09 DIAGNOSIS — I10 ESSENTIAL HYPERTENSION: ICD-10-CM

## 2023-05-09 RX ORDER — HYDROCHLOROTHIAZIDE 25 MG/1
25 TABLET ORAL DAILY
Qty: 30 TABLET | Refills: 11 | Status: SHIPPED | OUTPATIENT
Start: 2023-05-09

## 2023-05-09 NOTE — TELEPHONE ENCOUNTER
Pt waqar stating that [facility] told him that we have to call and refill his hydrochlorothiazide even though he has refills until Nov   Please call pharm he only has 5 pills left

## 2023-05-30 ENCOUNTER — OFFICE VISIT (OUTPATIENT)
Dept: CARDIOLOGY CLINIC | Facility: CLINIC | Age: 68
End: 2023-05-30
Payer: COMMERCIAL

## 2023-05-30 VITALS
SYSTOLIC BLOOD PRESSURE: 156 MMHG | BODY MASS INDEX: 32.1 KG/M2 | HEART RATE: 62 BPM | WEIGHT: 237 LBS | HEIGHT: 72 IN | DIASTOLIC BLOOD PRESSURE: 82 MMHG | OXYGEN SATURATION: 98 %

## 2023-05-30 DIAGNOSIS — R00.1 BRADYCARDIA: ICD-10-CM

## 2023-05-30 DIAGNOSIS — I48.92 PAROXYSMAL ATRIAL FLUTTER (HCC): Primary | ICD-10-CM

## 2023-05-30 DIAGNOSIS — I10 ESSENTIAL HYPERTENSION: ICD-10-CM

## 2023-05-30 DIAGNOSIS — E78.2 MIXED HYPERLIPIDEMIA: ICD-10-CM

## 2023-05-30 DIAGNOSIS — G47.33 OSA (OBSTRUCTIVE SLEEP APNEA): ICD-10-CM

## 2023-05-30 PROCEDURE — 99214 OFFICE O/P EST MOD 30 MIN: CPT | Performed by: INTERNAL MEDICINE

## 2023-05-30 NOTE — PATIENT INSTRUCTIONS
Continue current medications. Blood pressure is a bit high today. Monitor intermittently and call me if consistently > 140. ECG today is unchanged from prior. I would try to cut back on salt intake. Call me with any change in symptoms.

## 2023-05-30 NOTE — PROGRESS NOTES
Cardiology Office Visit    Nellie Prince  [id number]  1955    [facility]  [address] [facility] [id number] [phone]      Dear Amanda Mistry MD,    I had the pleasure of seeing your patient at our [facility] [place] office today 5/30/2023. As you know he is a pleasant 75y.o. year old male with a medical history as described below. Reason for office visit: Follow up paroxysmal atrial fibrillation, hypertension and hyperlipidemia. 1. Paroxysmal atrial flutter (HCC)  -     POCT ECG    2. Essential hypertension    3. Mixed hyperlipidemia    4. Bradycardia    5. CADY (obstructive sleep apnea)       Discussion/Plan:  Terri Lerma continues to do well from a cardiac standpoint. He remains asymptomatic with regards to atrial fibrillation. His lipid panel 8/2022 noted elevated triglycerides which he attributes to his diet which he admits is poor currently. He is tolerating CPAP with good and bad nights. He is walking daily without limitation. Recommendations:   He will continue his current medications without change. Blood pressure is high today. I have asked him to cut back on his sodium intake and have recommended overall dietary modification for weight loss and triglyceride lowering. I suspect his blood pressure will improve with both dietary modification and weight loss. I have asked him to monitor BP intermittently and call me if consistently > 140. ECG today is unchanged from prior. (LAFB/IVCB)  I have asked Terri Lerma to call the office with any change in symptoms. He should have updated lipid panel with PCP. Should consider the addition of statin therapy if he continues to have dyslipidemia.    Will plan routine follow up in the office unless symptoms warrant earlier assessment.   ______________________________      HPI     Terri Lerma has a past medical history of paroxysmal atrial fibrillation, HTN, HLD, and CADY now on CPAP. Kan Nieves presented to [address] [place] [address] ER on 1/2/2021 following an urgent care visit for urinary symptoms. He was diagnosed with a UTI, but also found to be in atrial flutter with RVR. HR was in the 130's upon presentation. He was started on diltiazem drip and was given cardiazem IV as well as metoprolol with minimal response. He underwent BONG guided cardioversion on 1/4/2021 and converted to sinus rhythm. He was discharged on Eliquis  5 mg twice daily and metoprolol tartrate 75mg BID. Following hospital discharge he did undergo an exercise stress test 4/23/2021 which showed no evidence of ischemia (full details below). He had a ZIO monitor 5/2021 with avg HR 48 bpm and 178 episodes of SVT with the longest run 16 beats. He was subsequently diagnosed with CADY and started CPAP therapy. Patient was last seen in the office by [address] [place] [address] 11/18/2021 at which time he was doing well overall. He noted improvement in energy levels with CPAP use. HCTZ was added for more optimal blood pressure control. 2/24/2022: Patient returns to the office today for follow up. He did lose some weight but has gained back some. He is walking every day up to 3.5 miles depending on the day. He is also using exercise bands to work out. No chest pain. No shortness of breath. No lightheadedness or dizziness. No palpitations. ECG today shows sinus bradycardia at 52 BPM.     * Patient was seen by [address] [place] [address] 6/30/2022.     9/23/2022: Patient returns to the office today for follow up. He is walking several days a week. He is using CPAP fairly consistently. No chest pain. No shortness of breath. No edema. No palpitations. Reviewed lipid panel 8/11/2022. Triglycerides 345. Holter 3/3/2022 showed an average heart rate of 49. He feels his energy levels are good. 5/30/2023: Kan Nieves returns to the office today for follow up. He is doing well overall. He is walking everyday as well as doing some pushups daily.  He denies any chest pain, shortness of breath, dyspnea on exertion, lightheadedness, dizziness or edema. He denies any palpitations. No bleeding issues. He tells me that he has good and bad nights with CPAP. He feels that his energy levels are good. ECG today shows sinus bradycardia at 59 bpm. He does admit he is eating a lot of fast food.      Patient Active Problem List   Diagnosis    Paroxysmal atrial flutter (HCC)    Essential hypertension    Coagulation disorder (HCC)    CADY (obstructive sleep apnea)    Mixed hyperlipidemia    Bradycardia     Past Medical History:   Diagnosis Date    Atrial flutter (HCC)     Hypertension     Mixed hyperlipidemia     CADY (obstructive sleep apnea)      Social History     Socioeconomic History    Marital status: /Civil Union     Spouse name: Not on file    Number of children: Not on file    Years of education: Not on file    Highest education level: Not on file   Occupational History    Occupation: Retired     Occupation:      Comment: Works part time since retiring   Tobacco Use    Smoking status: Never    Smokeless tobacco: Never   Vaping Use    Vaping Use: Never used   Substance and Sexual Activity    Alcohol use: Not Currently    Drug use: Never    Sexual activity: Not on file     Comment: Defer   Other Topics Concern    Not on file   Social History Narrative    Not on file     Social Determinants of Health     Financial Resource Strain: Not on file   Food Insecurity: Not on file   Transportation Needs: Not on file   Physical Activity: Not on file   Stress: Not on file   Social Connections: Not on file   Intimate Partner Violence: Not on file   Housing Stability: Not on file      Family History   Problem Relation Age of Onset    No Known Problems Mother     Hypertension Father      Past Surgical History:   Procedure Laterality Date    CARDIOVERSION  01/04/2021    TONSILLECTOMY  age 5    WISDOM TOOTH EXTRACTION         Current Outpatient Medications:     amLODIPine (NORVASC) 5 mg tablet, TAKE 1 TABLET BY MOUTH EVERY DAY, Disp: 90 tablet, Rfl: 3    apixaban (Eliquis) 5 mg, Take 1 tablet (5 mg total) by mouth 2 (two) times a day, Disp: 60 tablet, Rfl: 11    [employer] Magnesium Oxide 250 MG TABS, Take 1 tablet (250 mg total) by mouth daily, Disp: 90 tablet, Rfl: 3    hydrochlorothiazide (HYDRODIURIL) 25 mg tablet, Take 1 tablet (25 mg total) by mouth daily, Disp: 30 tablet, Rfl: 11    losartan (COZAAR) 100 MG tablet, TAKE 1 TABLET BY MOUTH EVERY DAY, Disp: 90 tablet, Rfl: 3    metoprolol tartrate (LOPRESSOR) 25 mg tablet, Take 1 tablet (25 mg total) by mouth every 12 (twelve) hours, Disp: 180 tablet, Rfl: 3      No Known Allergies      Cardiac Test Results:     Lipid panel 8/11/2022: C 194. T 345. H 31. LDL (direct) 107. ECG 2/24/2022: Sinus bradycardia at 52 bpm. IVCD. Non specific ST abnormality. Lipid panel 7/29/2021: C 187. T 217. H 31. L 113. ZIO 5/6-5/20/2021:  Min HR 32. Max . Avg HR 48. One 4 beat run of VT. 178 episodes of SVT with the longest lasting 16 beats. Occasional PAC's. Rare PVC's. Exercise stress test 4/23/2021:  Artur protocol. 8:04. METs 10.1. Frequent PAC's with short burst of probable AT and occasional couplet PVC's and one 3 beat run NSVT. No evidence of ischemia. BONG 1/4/2021:  EF 55-60%. Mild LVH. Left and right atrium mildly dilated. Mild spontaneous echo contrast without evidence of left atrial appendage thrombus. No ASD or PFO with bubble study. Trace to mild MR. Trace to mild TR. Review of Systems:    Review of Systems   Constitutional:  Positive for fatigue (improved). Negative for activity change and appetite change. HENT:  Negative for congestion, hearing loss, tinnitus and trouble swallowing. Eyes:  Negative for visual disturbance. Respiratory:  Negative for cough, chest tightness, shortness of breath and wheezing. Cardiovascular:  Negative for chest pain, palpitations and leg swelling.    Gastrointestinal: Negative for abdominal distention, abdominal pain, nausea and vomiting. Genitourinary:  Negative for difficulty urinating. Musculoskeletal:  Negative for arthralgias. Skin:  Negative for rash. Neurological:  Negative for dizziness, syncope and light-headedness. Hematological:  Does not bruise/bleed easily. Psychiatric/Behavioral:  Negative for confusion. The patient is not nervous/anxious. All other systems reviewed and are negative. Vitals:    05/30/23 1416 05/30/23 1453   BP: 154/80 156/82   Pulse: 62    SpO2: 98%    Weight: 108 kg (237 lb)    Height: 6' (1.829 m)      Vitals:    05/30/23 1416   Weight: 108 kg (237 lb)     Height: 6' (182.9 cm)     Wt Readings from Last 3 Encounters:   10/05/23 107 kg (235 lb 3.2 oz)   05/30/23 108 kg (237 lb)   09/29/22 108 kg (239 lb)         Physical Exam  Vitals reviewed. Constitutional:       Appearance: He is well-developed. HENT:      Head: Normocephalic and atraumatic. Eyes:      Conjunctiva/sclera: Conjunctivae normal.      Pupils: Pupils are equal, round, and reactive to light. Neck:      Vascular: No JVD. Cardiovascular:      Rate and Rhythm: Regular rhythm. Bradycardia present. Heart sounds: Normal heart sounds. No murmur heard. No friction rub. No gallop. Pulmonary:      Effort: Pulmonary effort is normal.      Breath sounds: Normal breath sounds. Abdominal:      General: Bowel sounds are normal.      Palpations: Abdomen is soft. Musculoskeletal:      Cervical back: Normal range of motion. Skin:     General: Skin is warm and dry. Neurological:      Mental Status: He is alert and oriented to person, place, and time.    Psychiatric:         Behavior: Behavior normal.

## 2023-06-05 DIAGNOSIS — I10 ESSENTIAL HYPERTENSION: ICD-10-CM

## 2023-06-05 RX ORDER — HYDROCHLOROTHIAZIDE 25 MG/1
25 TABLET ORAL DAILY
Qty: 90 TABLET | Refills: 3 | Status: SHIPPED | OUTPATIENT
Start: 2023-06-05

## 2023-09-23 NOTE — PROGRESS NOTES
[facility]        NAME: Leander Up is a 71 y o  male  : 1955    MRN: [id number]  DATE: February 15, 2020  TIME: 9:56 AM    Assessment and Plan   Viral URI [J06 9]  1  Viral URI  predniSONE 10 mg tablet    DISCONTINUED: azithromycin (ZITHROMAX) 250 mg tablet         Patient Instructions     Patient Instructions     You have been diagnosed with a Viral Upper Respiratory infection and your symptoms should resolve over the next 7 to 10 days with the treatments recommended today   If they do not, it is possible that you have developed a bacterial infection and you should return  If you were to take the antibiotic while you are still in the viral stage, you will not get better any faster, but could kill off the good germs in your body as well as make the germs in you resistant to the antibiotic  Take an expectorant - guaifenesin should be the only ingredient - during the day, and the cough suppressant (ex  Robitussin DM or Tessalon) if needed at night only  Take Zinc 12 5 to 15 mg every 2 - 3 hrs while awake for the next few days   You may take Cold Harvinder (13 3 mg of Zinc) or split a 25 mg Zinc tablet or lozenge in two or a 50 mg into four to get the proper dose   The total daily dose of Zinc should exceed 75 mg per day  You may also take a decongestant like Sudafed, unless you have hypertension or cardiac disease  Hold any NSAID's like Ibuprofen (Advil), Naprosyn (Aleve), etc while on steroids like Medrol or Prednisone  If you are diabetic, you should also adhere strictly to your diet and monitor your blood sugar closely while on the steroids as discussed  Upper Respiratory Infection   AMBULATORY CARE:   An upper respiratory infection  is also called a common cold  It can affect your nose, throat, ears, and sinuses  Common signs and symptoms include the following:  Cold symptoms are usually worst for the first 3 to 5 days   You may have any of the following:  · Runny or stuffy nose    · Sneezing and coughing    · Sore throat or hoarseness    · Red, watery, and sore eyes    · Fatigue     · Chills and fever    · Headache, body aches, or sore muscles  Seek care immediately if:   · You have chest pain or trouble breathing  Contact your healthcare provider if:   · You have a fever over 102ºF (39°C)  · Your sore throat gets worse or you see white or yellow spots in your throat  · Your symptoms get worse after 3 to 5 days or your cold is not better in 14 days  · You have a rash anywhere on your skin  · You have large, tender lumps in your neck  · You have thick, green or yellow drainage from your nose  · You cough up thick yellow, green, or bloody mucus  · You have vomiting for more than 24 hours and cannot keep fluids down  · You have a bad earache  · You have questions or concerns about your condition or care  Treatment for a cold: There is no cure for the common cold  Colds are caused by viruses and do not get better with antibiotics  Most people get better in 7 to 14 days  You may continue to cough for 2 to 3 weeks  The following may help decrease your symptoms:  · Decongestants  help reduce nasal congestion and help you breathe more easily  If you take decongestant pills, they may make you feel restless or not able to sleep  Do not use decongestant sprays for more than a few days  · Cough suppressants  help reduce coughing  Ask your healthcare provider which type of cough medicine is best for you  · NSAIDs , such as ibuprofen, help decrease swelling, pain, and fever  NSAIDs can cause stomach bleeding or kidney problems in certain people  If you take blood thinner medicine, always ask your healthcare provider if NSAIDs are safe for you  Always read the medicine label and follow directions  · Acetaminophen  decreases pain and fever  It is available without a doctor's order  Ask how much to take and how often to take it  Follow directions   Read the labels of all other medicines you are using to see if they also contain acetaminophen, or ask your doctor or pharmacist  Acetaminophen can cause liver damage if not taken correctly  Do not use more than 4 grams (4,000 milligrams) total of acetaminophen in one day  Manage your cold:   · Rest as much as possible  Slowly start to do more each day  · Drink more liquids as directed  Liquids will help thin and loosen mucus so you can cough it up  Liquids will also help prevent dehydration  Liquids that help prevent dehydration include water, fruit juice, and broth  Do not drink liquids that contain caffeine  Caffeine can increase your risk for dehydration  Ask your healthcare provider how much liquid to drink each day  · Soothe a sore throat  Gargle with warm salt water  This helps your sore throat feel better  Make salt water by dissolving ¼ teaspoon salt in 1 cup warm water  You may also suck on hard candy or throat lozenges  You may use a sore throat spray  · Use a humidifier or vaporizer  Use a cool mist humidifier or a vaporizer to increase air moisture in your home  This may make it easier for you to breathe and help decrease your cough  · Use saline nasal drops as directed  These help relieve congestion  · Apply petroleum-based jelly around the outside of your nostrils  This can decrease irritation from blowing your nose  · Do not smoke  Nicotine and other chemicals in cigarettes and cigars can make your symptoms worse  They can also cause infections such as bronchitis or pneumonia  Ask your healthcare provider for information if you currently smoke and need help to quit  E-cigarettes or smokeless tobacco still contain nicotine  Talk to your healthcare provider before you use these products  Prevent spreading your cold to others:   · Try to stay away from other people during the first 2 to 3 days of your cold when it is more easily spread  · Do not share food or drinks       · Do not share hand towels with household members  · Wash your hands often, especially after you blow your nose  Turn away from other people and cover your mouth and nose with a tissue when you sneeze or cough  Follow up with your healthcare provider as directed:  Write down your questions so you remember to ask them during your visits  © 2017 [employer] Information is for End User's use only and may not be sold, redistributed or otherwise used for commercial purposes  All illustrations and images included in CareNotes® are the copyrighted property of A [employer] M [employer]  or [employer]  The above information is an  only  It is not intended as medical advice for individual conditions or treatments  Talk to your doctor, nurse or pharmacist before following any medical regimen to see if it is safe and effective for you  Follow up with PCP in 3-5 days  Proceed to  ER if symptoms worsen  Chief Complaint     Chief Complaint   Patient presents with    Cough     congestion headache 10 days with symptoms          History of Present Illness       Patient complains of cough and congestion for past 10 days  Cough is now productive of greenish sputum  He denies fever chills  Review of Systems   Review of Systems   Constitutional: Negative for chills and fever  HENT: Positive for congestion, rhinorrhea and sinus pressure  Negative for ear discharge and hearing loss  Respiratory: Positive for cough  Negative for chest tightness, shortness of breath and wheezing  Gastrointestinal: Negative for diarrhea and vomiting  Musculoskeletal: Negative for neck stiffness  Skin: Negative for pallor  Neurological: Negative for headaches           Current Medications       Current Outpatient Medications:     lisinopril (ZESTRIL) 10 mg tablet, , Disp: , Rfl:     predniSONE 10 mg tablet, Take once daily all days pills on this schedule 6- 6- 5- 4- 3- 2- 1, Disp: 27 tablet, Rfl: 0    Current Allergies     Allergies as of 02/15/2020    (No Known Allergies)            The following portions of the patient's history were reviewed and updated as appropriate: allergies, current medications, past family history, past medical history, past social history, past surgical history and problem list      Past Medical History:   Diagnosis Date    Hypertension        Past Surgical History:   Procedure Laterality Date    NO PAST SURGERIES         Family History   Problem Relation Age of Onset    No Known Problems Mother     No Known Problems Father          Medications have been verified  Objective   /81   Pulse 73   Temp 98 4 °F (36 9 °C)   Resp 18   Ht 6' 1" (1 854 m)   Wt 107 kg (235 lb)   SpO2 99%   BMI 31 00 kg/m²        Physical Exam     Physical Exam   Constitutional: He is oriented to person, place, and time  He appears well-developed and well-nourished  No distress  HENT:   Head: Normocephalic and atraumatic  Right Ear: Tympanic membrane, external ear and ear canal normal    Left Ear: Tympanic membrane, external ear and ear canal normal    Nose: Mucosal edema and rhinorrhea present  Mouth/Throat: Posterior oropharyngeal erythema present  Nasal mucosa congested, throat mildly erythematous   Eyes: Pupils are equal, round, and reactive to light  Conjunctivae are normal    Neck: Neck supple  Cardiovascular: Normal rate, regular rhythm and normal heart sounds  Pulmonary/Chest: Effort normal and breath sounds normal  No respiratory distress  Lymphadenopathy:     He has no cervical adenopathy  Neurological: He is alert and oriented to person, place, and time  Skin: Skin is warm and dry  He is not diaphoretic  No pallor  Psychiatric: He has a normal mood and affect  His behavior is normal  Judgment and thought content normal    Nursing note and vitals reviewed  Med rec clarified   -Cont. IV Keppra 1000 BID  -Cont. IV Vimpat IVPB 200 BID  -Cont. Valproic acid 250 mg PO via PEG TID  -F/u neurology recommendations

## 2023-10-05 ENCOUNTER — OFFICE VISIT (OUTPATIENT)
Dept: SLEEP CENTER | Facility: CLINIC | Age: 68
End: 2023-10-05
Payer: COMMERCIAL

## 2023-10-05 VITALS
BODY MASS INDEX: 31.86 KG/M2 | HEIGHT: 72 IN | HEART RATE: 84 BPM | WEIGHT: 235.2 LBS | SYSTOLIC BLOOD PRESSURE: 128 MMHG | DIASTOLIC BLOOD PRESSURE: 60 MMHG | TEMPERATURE: 97.3 F | OXYGEN SATURATION: 99 %

## 2023-10-05 DIAGNOSIS — I48.92 PAROXYSMAL ATRIAL FLUTTER (HCC): ICD-10-CM

## 2023-10-05 DIAGNOSIS — R40.0 DAYTIME SLEEPINESS: ICD-10-CM

## 2023-10-05 DIAGNOSIS — I10 ESSENTIAL HYPERTENSION: ICD-10-CM

## 2023-10-05 DIAGNOSIS — E66.9 OBESITY (BMI 30-39.9): ICD-10-CM

## 2023-10-05 DIAGNOSIS — R09.81 NASAL CONGESTION: ICD-10-CM

## 2023-10-05 DIAGNOSIS — G47.9 SLEEP DISTURBANCE: ICD-10-CM

## 2023-10-05 DIAGNOSIS — G47.33 OSA (OBSTRUCTIVE SLEEP APNEA): Primary | ICD-10-CM

## 2023-10-05 PROCEDURE — 99214 OFFICE O/P EST MOD 30 MIN: CPT | Performed by: INTERNAL MEDICINE

## 2023-10-05 NOTE — PATIENT INSTRUCTIONS

## 2023-10-05 NOTE — PROGRESS NOTES
Follow-Up Note - Lit Ndiaye  70 y.o. male  :1955  TZY:[id number]  DOS:10/5/2023    CC: I saw this patient for follow-up in clinic today for Sleep disordered breathing, Coexisting Sleep and Medical Problems. Interval changes: None reported. PSG in  demonstrated severe obstructive sleep apnea: AHI 40 /hour , worse while supine. Loud intensity  snoring  was noted there were also 5 respiratory effort-related arousals. .  Minimum oxygen saturation 87 %  and 2.5 minutes of total sleep time was spent with saturations less than 90%. PFSH, Problem List, Medications & Allergies were reviewed in EMR. He  has a past medical history of Atrial flutter ([address]), Hypertension, Mixed hyperlipidemia, and CADY (obstructive sleep apnea). He has a current medication list which includes the following prescription(s): amlodipine, apixaban, [facility] magnesium oxide, hydrochlorothiazide, losartan, and metoprolol tartrate. PHYSIOLOGICAL DATA REVIEW : No data was available, but he reports regular use of the device for > 4 hours/night. ;  Patient has not been using non FDA approved devices to sanitize the machine. INTERPRETATION: Compliance is good based on his report and previous data; Pressure setting is:10-17cm; ;   SUBJECTIVE: With respect to use of PAP, Alea Allen  is experiencing minimal adverse effects:mask leaks . He derives benefit. Is satisfied with sleep and daytime function. Sleep Routine: Alea Allen reports getting 7-8 hrs sleep; he has no difficulty initiating or maintaining sleep  But reads for approximately an hour before falling asleep. He arises needing an alarm and feels refreshed. Alea Allen denies excessive daytime sleepiness,. He rated [himself] at Total score: 9 /24 on the [place] Sleepiness Scale. Other issues: None reported. Habits:[ reports that he has never smoked.  He has never used smokeless tobacco.], [ reports that he does not currently use alcohol.], [ reports no history of drug use.], Caffeine use:limited; Exercise routine: regular. ROS: Significant for weight has been stable within a few pounds. Nasal congestion is improved when using CPAP. He has not had any recent episodes of atrial fibrillation. Nicole Fuller EXAM: /60 (BP Location: Left arm, Cuff Size: Large)   Pulse 84   Temp (!) 97.3 °F (36.3 °C) (Temporal)   Ht 6' (1.829 m)   Wt 107 kg (235 lb 3.2 oz)   SpO2 99%   BMI 31.90 kg/m²     Wt Readings from Last 3 Encounters:   10/05/23 107 kg (235 lb 3.2 oz)   05/30/23 108 kg (237 lb)   09/29/22 108 kg (239 lb)      Patient is well groomed; well appearing. CNS: Alert, orientated, speech clear & coherent  Psych: cooperative and in no distress. Mental state: Appears normal.  H&N: EOMI; NC/AT: No facial pressure marks, no rashes. Skin/Extrem: col & hydration normal; no edema  Resp: Respiratory effort is normal  Physical findings otherwise essentially unchanged from previous. IMPRESSION: Problem List Items & Comorbidities Addressed this Visit    1. CADY (obstructive sleep apnea)  PAP DME Resupply/Reorder      2. Sleep disturbance        3. Daytime sleepiness        4. Nasal congestion        5. Essential hypertension        6. Paroxysmal atrial flutter (HCC)        7. Obesity (BMI 30-39. 9)            PLAN:  1. I reviewed results of prior studies and previous physiologic data with the patient. 2. I discussed treatment options with risks and benefits. 3. Treatment with  PAP is medically necessary and Mirtha Zaidi is agreable to continue use. 4. Care of equipment, methods to improve comfort using PAP and importance of compliance with therapy were discussed. 5. Pressure setting: continue 10-17 cmH2O. he will need to get data download sent to us to facilitate any pressure adjustments. Also advised him to download my air pineda for data access going forward. 6. Rx provided to replace supplies and Care coordinated with DME provider.    7. Discussed strategies for weight reduction. 8. Follow-up is advised in 1 year or sooner if needed to monitor progress, compliance and to adjust therapy. Thank you for allowing me to participate in the care of this patient. Sincerely,     Authenticated electronically on 83/17/55   Board Certified Specialist     Portions of the record may have been created with voice recognition software. Occasional wrong word or "sound a like" substitutions may have occurred due to the inherent limitations of voice recognition software. There may also be notations and random deletions of words or characters from malfunctioning software. Read the chart carefully and recognize, using context, where substitutions/deletions have occurred.

## 2023-10-06 ENCOUNTER — TELEPHONE (OUTPATIENT)
Dept: SLEEP CENTER | Facility: CLINIC | Age: 68
End: 2023-10-06

## 2023-10-10 ENCOUNTER — TELEPHONE (OUTPATIENT)
Dept: CARDIOLOGY CLINIC | Facility: CLINIC | Age: 68
End: 2023-10-10

## 2023-10-10 DIAGNOSIS — I10 ESSENTIAL HYPERTENSION: ICD-10-CM

## 2023-10-10 RX ORDER — AMLODIPINE BESYLATE 5 MG/1
5 TABLET ORAL DAILY
Qty: 90 TABLET | Refills: 3 | Status: SHIPPED | OUTPATIENT
Start: 2023-10-10

## 2023-10-10 NOTE — TELEPHONE ENCOUNTER
Pt called and is wondering if uHi Darling still wants him to be on his Norvasc if so he only has 4 pills left and would need a refill called into his pharm.   Please call pt and let him know if he needs to continue that and if [facility].  Pt's # [phone]

## 2023-10-11 RX ORDER — LOSARTAN POTASSIUM 100 MG/1
100 TABLET ORAL DAILY
Qty: 90 TABLET | Refills: 3 | Status: SHIPPED | OUTPATIENT
Start: 2023-10-11

## 2023-10-11 NOTE — TELEPHONE ENCOUNTER
Pt waqar stating that he needs his Losartan refilled at his [facility]  Please call pt on his cell when that is done.

## 2023-11-18 DIAGNOSIS — I10 ESSENTIAL HYPERTENSION: ICD-10-CM

## 2023-11-20 RX ORDER — HYDROCHLOROTHIAZIDE 25 MG/1
25 TABLET ORAL DAILY
Qty: 90 TABLET | Refills: 4 | Status: SHIPPED | OUTPATIENT
Start: 2023-11-20

## 2023-11-30 PROCEDURE — 93000 ELECTROCARDIOGRAM COMPLETE: CPT | Performed by: INTERNAL MEDICINE

## 2024-01-15 DIAGNOSIS — I48.92 ATRIAL FLUTTER (HCC): ICD-10-CM

## 2024-01-15 NOTE — TELEPHONE ENCOUNTER
Called PtGELA to return our call. Need to know if Pt wants script for Eliquis 5 mg to go to [facility]?

## 2024-01-17 NOTE — TELEPHONE ENCOUNTER
Pt lm stating that the pharm needs to hear from you for his refill on his Eliquis [facility] [place] they need to hear from you per pt.  Please call pt [phone]

## 2024-01-18 NOTE — TELEPHONE ENCOUNTER
Pt waqar he wants to saty with [facility] in [place] for his Eliquis.  He would like to speak to someone.  Please call pt [phone]

## 2024-02-08 DIAGNOSIS — I48.92 PAROXYSMAL ATRIAL FLUTTER (HCC): ICD-10-CM

## 2024-02-08 RX ORDER — CARBOXYMETHYLCELLULOSE SODIUM 0.5 %
250 DROPPERETTE, SINGLE-USE DROP DISPENSER OPHTHALMIC (EYE) DAILY
Qty: 90 TABLET | Refills: 3 | Status: SHIPPED | OUTPATIENT
Start: 2024-02-08

## 2024-02-08 NOTE — TELEPHONE ENCOUNTER
Pt waqar stating that he needs a refill on his metoprolol and magnesium called into the [facility].  Pt would like a call when that is done,  [phone]

## 2024-06-04 ENCOUNTER — TELEPHONE (OUTPATIENT)
Dept: SLEEP CENTER | Facility: CLINIC | Age: 69
End: 2024-06-04

## 2024-06-04 NOTE — TELEPHONE ENCOUNTER
Left a message for the patient that his 10/10/24 visit with Dr. Gregorio has been cancelled due to schedule changes.  Gave another appointment for 2/24/25 @ 10:15 am with Dr. Gregorio.   Left message with phone number to call if that date and time is not good for patient.

## 2024-06-25 ENCOUNTER — OFFICE VISIT (OUTPATIENT)
Dept: CARDIOLOGY CLINIC | Facility: CLINIC | Age: 69
End: 2024-06-25
Payer: COMMERCIAL

## 2024-06-25 VITALS
BODY MASS INDEX: 32.1 KG/M2 | TEMPERATURE: 97.9 F | HEIGHT: 72 IN | DIASTOLIC BLOOD PRESSURE: 80 MMHG | WEIGHT: 237 LBS | HEART RATE: 56 BPM | OXYGEN SATURATION: 99 % | SYSTOLIC BLOOD PRESSURE: 144 MMHG

## 2024-06-25 DIAGNOSIS — I48.92 PAROXYSMAL ATRIAL FLUTTER (HCC): Primary | ICD-10-CM

## 2024-06-25 DIAGNOSIS — G47.33 OSA (OBSTRUCTIVE SLEEP APNEA): ICD-10-CM

## 2024-06-25 DIAGNOSIS — E78.2 MIXED HYPERLIPIDEMIA: ICD-10-CM

## 2024-06-25 DIAGNOSIS — I10 ESSENTIAL HYPERTENSION: ICD-10-CM

## 2024-06-25 PROCEDURE — 99214 OFFICE O/P EST MOD 30 MIN: CPT | Performed by: NURSE PRACTITIONER

## 2024-06-25 NOTE — ASSESSMENT & PLAN NOTE
Blood pressure at goal.  Continue amlodipine 5 mg daily, HCTZ 25 mg daily, losartan 100 mg daily, and metoprolol tartrate 25 mg BID.  Lab work reviewed.  Discussed benefits of weight loss, 2 g sodium diet, and regular exercise.

## 2024-06-25 NOTE — PATIENT INSTRUCTIONS
Continue current medication.  Limit simple carbs. Increase fiber.  Based on upcoming lab results would recommend a low dose statin.  Monitor Bp and notify our office if staying > 140/90.

## 2024-06-25 NOTE — ASSESSMENT & PLAN NOTE
Lipid panel 7/29/2021: . . HDL 31. .  Lipid panel 8/17/2023: C 204. T 352. H 31. L 103.  Goal LDL < 100.  Recommend addition of statin therapy. Would avoid prescription omega 3 due to risk for a fib.  We reviewed dietary modifications.  He will be going for updated labs through PCP. Recommend addition of low dose statin if labs remain unchanged.

## 2024-06-25 NOTE — ASSESSMENT & PLAN NOTE
New onset atrial flutter with RVR discovered 1/2021, unclear duration.  Chads Vasc 2 on Eliquis 5 mg BID for anticoagulation.  Patient is s/p BONG guided cardioversion 1/4/2021.   BONG 1/4/2021 EF 55-60% without significant valvular abnormalities.  14 day ZIO monitor 5/2021 revealed avg HR 48 bpm with 178 episodes of SVT, longest 16 beats.  Positive sleep study and now using CPAP.  Exercise stress test 4/23/2021 was negative for ischemia or exercise induced AF.  24 hour Holter monitor 3/3/22 showed SB with avg HR 49 bpm, 2 short runs of atrial tachycardia.  Metoprolol tartrate reduced from 50 to 25 mg BID.   HR's remain in 50's at OV's. He declines ECG today.  Remains asymptomatic.  Continue current regimen.

## 2024-06-25 NOTE — PROGRESS NOTES
Cardiology Follow Up    Dominic GALINDO Fetterolf  1955  [id number]  [facility] [place] [address]  [address]  [facility] [address] [phone]    Dominic presents for routine follow up of paroxysmal atrial fibrillation, hypertension and hyperlipidemia.      1. Paroxysmal atrial flutter (HCC)  Assessment & Plan:  New onset atrial flutter with RVR discovered 1/2021, unclear duration.  Chads Vasc 2 on Eliquis 5 mg BID for anticoagulation.  Patient is s/p BONG guided cardioversion 1/4/2021.   BONG 1/4/2021 EF 55-60% without significant valvular abnormalities.  14 day ZIO monitor 5/2021 revealed avg HR 48 bpm with 178 episodes of SVT, longest 16 beats.  Positive sleep study and now using CPAP.  Exercise stress test 4/23/2021 was negative for ischemia or exercise induced AF.  24 hour Holter monitor 3/3/22 showed SB with avg HR 49 bpm, 2 short runs of atrial tachycardia.  Metoprolol tartrate reduced from 50 to 25 mg BID.   HR's remain in 50's at OV's. He declines ECG today.  Remains asymptomatic.  Continue current regimen.  2. Essential hypertension  Assessment & Plan:  Blood pressure at goal.  Continue amlodipine 5 mg daily, HCTZ 25 mg daily, losartan 100 mg daily, and metoprolol tartrate 25 mg BID.  Lab work reviewed.  Discussed benefits of weight loss, 2 g sodium diet, and regular exercise.  3. Mixed hyperlipidemia  Assessment & Plan:  Lipid panel 7/29/2021: . . HDL 31. .  Lipid panel 8/17/2023: C 204. T 352. H 31. L 103.  Goal LDL < 100.  Recommend addition of statin therapy. Would avoid prescription omega 3 due to risk for a fib.  We reviewed dietary modifications.  He will be going for updated labs through PCP. Recommend addition of low dose statin if labs remain unchanged.  4. CADY (obstructive sleep apnea)  Assessment & Plan:  Compliant with CPAP.  Follows with Dr. Gregorio with [facility] sleep medicine.  Reviewed importance of CADY management in  the setting of history of atrial flutter.       ELPIDIO Alfaro has a past medical history of paroxysmal atrial fibrillation, HTN, HLD, and CADY on CPAP.     He established with cardiology during a hospitalization to [facility] 1/2-1/5/2021 when he presented with rapid atrial flutter. He had gone to a local urgent care for urinary symptoms where he was diagnosed with UTI, but also found to be in atrial flutter with HR's in the 130's. He underwent BONG guided cardioversion on 1/4/21 and was converted to sinus rhythm. He was discharged home on Eliquis 5 mg BID and metoprolol tartrate 75 mg BID.     Exercise stress test 4/23/2021 showed no evidence of ischemia (full details below).  He had a ZIO monitor 5/2021 with avg HR 48 bpm and 178 episodes of SVT with the longest run 16 beats.      He was subsequently diagnosed with CADY and started CPAP therapy.     He was seen by me on 11/18/2021 at which time HCTZ was added for more optimal blood pressure control.      24 hour Holter monitor 3/3/2022 showed SB with avg HR 49 bpm, HR . 2 short runs of asymptomatic atrial tachycardia were noted. His metoprolol tartrate was reduced from 50 to 25 mg BID.     He followed up most recently with Dr. Flynn on 5/30/2023 for routine follow up. ECG showed sinus rhythm with left anterior fascicular block/IVCD. They discussed his cholesterol and she recommended an updated lipid panel. No changes were made to his regimen.    6/25/2024: Dominic presents for routine follow up. He declines an EKG today. He states he is doing very well. He goes for regular walks, which he tolerates without issue. No chest pain, shortness of breath, activity intolerance, edema/orthopnea. No palpitations or heart racing. No bleeding issues. He is taking his medications. He is not monitoring BP at home.  He is using his CPAP faithfully. He completed labs in August 2023 for his PCP. TG remain elevated at 350. . He admits he could do better with his carb intake. He  does not drink alcohol.     Medical Problems       Problem List       Coagulation disorder (HCC)    Bradycardia    Paroxysmal atrial flutter (HCC)    Essential hypertension    Mixed hyperlipidemia    CADY (obstructive sleep apnea)        Past Medical History:   Diagnosis Date    Atrial flutter (HCC)     Hypertension     Mixed hyperlipidemia     CADY (obstructive sleep apnea)      Social History     Socioeconomic History    Marital status: /Civil Union     Spouse name: Not on file    Number of children: Not on file    Years of education: Not on file    Highest education level: Not on file   Occupational History    Occupation: Retired     Occupation:      Comment: Works part time since retiring   Tobacco Use    Smoking status: Never    Smokeless tobacco: Never   Vaping Use    Vaping status: Never Used   Substance and Sexual Activity    Alcohol use: Not Currently    Drug use: Never    Sexual activity: Not on file     Comment: Defer   Other Topics Concern    Not on file   Social History Narrative    Not on file     Social Determinants of Health     Financial Resource Strain: Low Risk  (8/17/2023)    Received from [employer]    Financial Resource Strain     Do you have any trouble paying for your medications, or do you think you might in the future?: No     Does your family have trouble paying for medicine? (Household - for ages 0-17 years): Not on file   Food Insecurity: No Food Insecurity (8/17/2023)    Received from [employer]    Hunger Vital Sign     Worried About Running Out of Food in the Last Year: Never true     Ran Out of Food in the Last Year: Never true   Transportation Needs: No Transportation Needs (8/17/2023)    Received from [employer]    Transportation Needs     READ ONLY Do you have trouble getting a ride to medical visits or work?: Never True     Does your family have a hard time getting a ride to doctors’ visits? (Household - for ages 0-17 years): Not on file     Has lack  of transportation kept you from medical appointments, meetings, work, or from getting things needed for daily living? Check all that apply. (Adult - for ages 18 years and over): Not on file     Do you (or your family) have trouble finding or paying for a ride (transportation)? (Household - for ages 0-17 years): Not on file   Physical Activity: Not on file   Stress: Not on file   Social Connections: Socially Integrated (8/17/2023)    Received from [employer]     How often do you feel lonely or isolated from those around you?: Never   Intimate Partner Violence: Not on file   Housing Stability: Low Risk  (8/17/2023)    Received from [facility]    Housing Stability     Do you currently live in a shelter or have no steady place to sleep at night?: No     READ ONLY Do you think you are at risk of becoming homeless?: No     Does your family worry about paying for your home or becoming homeless? (Household - for ages 0-17 years): Not on file     Are you homeless or worried that you might be in the future? (Adult - for ages 18 years and over): Not on file     Are you (or your family) homeless or worried that you might be in the future? (Household - for ages 0-17 years): Not on file      Family History   Problem Relation Age of Onset    No Known Problems Mother     Hypertension Father      Past Surgical History:   Procedure Laterality Date    CARDIOVERSION  01/04/2021    TONSILLECTOMY  age 7    WISDOM TOOTH EXTRACTION         Current Outpatient Medications:     amLODIPine (NORVASC) 5 mg tablet, Take 1 tablet (5 mg total) by mouth daily, Disp: 90 tablet, Rfl: 3    apixaban (Eliquis) 5 mg, Take 1 tablet (5 mg total) by mouth 2 (two) times a day, Disp: 180 tablet, Rfl: 3    [facility] Magnesium Oxide 250 MG TABS, Take 1 tablet (250 mg total) by mouth daily, Disp: 90 tablet, Rfl: 3    hydrochlorothiazide (HYDRODIURIL) 25 mg tablet, TAKE 1 TABLET (25 MG TOTAL) BY MOUTH DAILY., Disp: 90 tablet, Rfl: 4    losartan  (COZAAR) 100 MG tablet, Take 1 tablet (100 mg total) by mouth daily, Disp: 90 tablet, Rfl: 3    metoprolol tartrate (LOPRESSOR) 25 mg tablet, Take 1 tablet (25 mg total) by mouth every 12 (twelve) hours, Disp: 180 tablet, Rfl: 3  No Known Allergies    Labs:     Chemistry        Component Value Date/Time    K 4.3 08/17/2023 0950     08/17/2023 0950    CO2 26 08/17/2023 0950    BUN 20 08/17/2023 0950    CREATININE 1.0 08/17/2023 0950        Component Value Date/Time    CALCIUM 9.2 08/17/2023 0950    ALKPHOS 54 08/17/2023 0950    AST 21 08/17/2023 0950    ALT 32 08/17/2023 0950        Lipid panel 8/17/2023: C 204. T 352. H 31. L 103.    Cardiac Test Results:   ECG 5/30/2023:Sinus bradycardia. 59 bpm. LAFB. Non specific intraventricular conduction block.     24 hour Holter monitor 3/3/2022:  Predominantly sinus bradycardia, HR , avg 49 bpm.  7 PVC's. 344 PAC's. Longest R-R 2.4 seconds. 2 short runs of AT. No symptoms.     ECG 2/24/2022: Sinus bradycardia at 52 bpm. IVCD. Non specific ST abnormality.      Lipid panel 7/29/2021: C 187. T 217. H 31. L 113.      ZIO 5/6-5/20/2021:  Min HR 32. Max . Avg HR 48.    One 4 beat run of VT.   178 episodes of SVT with the longest lasting 16 beats.   Occasional PAC's. Rare PVC's.      Exercise stress test 4/23/2021:  Artur protocol. 8:04. METs 10.1.  Frequent PAC's with short burst of probable AT and occasional couplet PVC's and one 3 beat run NSVT.  No evidence of ischemia.     BONG 1/4/2021:  EF 55-60%.  Mild LVH.   Left and right atrium mildly dilated.   Mild spontaneous echo contrast without evidence of left atrial appendage thrombus.   No ASD or PFO with bubble study.   Trace to mild MR. Trace to mild TR.    Review of Systems   Constitutional: Negative.   HENT: Negative.     Cardiovascular:  Negative for chest pain, dyspnea on exertion, irregular heartbeat, leg swelling, near-syncope, orthopnea and palpitations.   Respiratory:  Negative for cough and snoring.     Endocrine: Negative.    Skin: Negative.    Musculoskeletal: Negative.    Gastrointestinal: Negative.    Genitourinary: Negative.    Neurological: Negative.    Psychiatric/Behavioral: Negative.         Vitals:    06/25/24 1508   BP: 144/80   Pulse:    Temp:    SpO2:      Vitals:    06/25/24 1430   Weight: 108 kg (237 lb)     Height: 6' (182.9 cm)   Body mass index is 32.14 kg/m².    Physical Exam  Vitals and nursing note reviewed.   Constitutional:       General: He is not in acute distress.     Appearance: He is well-developed. He is obese. He is not diaphoretic.   HENT:      Head: Normocephalic and atraumatic.   Neck:      Vascular: No JVD.   Cardiovascular:      Rate and Rhythm: Normal rate and regular rhythm. No extrasystoles are present.     Pulses: Intact distal pulses.      Heart sounds: Normal heart sounds, S1 normal and S2 normal. No murmur heard.     No friction rub. No gallop.      Comments: No edema  Pulmonary:      Effort: Pulmonary effort is normal. No respiratory distress.      Breath sounds: Normal breath sounds.   Abdominal:      General: There is no distension.      Palpations: Abdomen is soft.      Tenderness: There is no abdominal tenderness.   Skin:     General: Skin is warm and dry.      Findings: No rash.   Neurological:      Mental Status: He is alert and oriented to person, place, and time.   Psychiatric:         Mood and Affect: Mood and affect normal.         Behavior: Behavior normal.

## 2024-06-25 NOTE — ASSESSMENT & PLAN NOTE
Compliant with CPAP.  Follows with Dr. Gregorio with [facility] sleep medicine.  Reviewed importance of CADY management in the setting of history of atrial flutter.

## 2024-09-09 DIAGNOSIS — I10 ESSENTIAL HYPERTENSION: ICD-10-CM

## 2024-09-09 RX ORDER — LOSARTAN POTASSIUM 100 MG/1
100 TABLET ORAL DAILY
Qty: 30 TABLET | Refills: 11 | Status: SHIPPED | OUTPATIENT
Start: 2024-09-09

## 2024-09-09 RX ORDER — AMLODIPINE BESYLATE 5 MG/1
5 TABLET ORAL DAILY
Qty: 90 TABLET | Refills: 1 | Status: SHIPPED | OUTPATIENT
Start: 2024-09-09

## 2024-09-09 NOTE — TELEPHONE ENCOUNTER
Labs done at [facility] 8/2024. See care everywhere. Renewed prescription

## 2024-10-30 DIAGNOSIS — I10 ESSENTIAL HYPERTENSION: ICD-10-CM

## 2024-10-30 RX ORDER — AMLODIPINE BESYLATE 5 MG/1
5 TABLET ORAL DAILY
Qty: 90 TABLET | Refills: 1 | Status: SHIPPED | OUTPATIENT
Start: 2024-10-30

## 2024-12-03 DIAGNOSIS — I10 ESSENTIAL HYPERTENSION: ICD-10-CM

## 2024-12-04 RX ORDER — HYDROCHLOROTHIAZIDE 25 MG/1
25 TABLET ORAL DAILY
Qty: 90 TABLET | Refills: 1 | Status: SHIPPED | OUTPATIENT
Start: 2024-12-04

## 2025-01-13 DIAGNOSIS — I48.92 PAROXYSMAL ATRIAL FLUTTER (HCC): ICD-10-CM

## 2025-01-14 DIAGNOSIS — I48.92 ATRIAL FLUTTER (HCC): ICD-10-CM

## 2025-01-14 NOTE — TELEPHONE ENCOUNTER
Reason for call:   [x] Refill   [] Prior Auth  [] Other:     Office:   [] PCP/Provider -   [x] Specialty/Provider - Cardio    Medication:   - Apixaban (Eliquis) 5 mg- take 1 tablet by mouth 2 times a day      Pharmacy: [facility]    Does the patient have enough for 3 days?   [x] Yes   [] No - Send as HP to POD

## 2025-01-15 RX ORDER — METOPROLOL TARTRATE 25 MG/1
25 TABLET, FILM COATED ORAL EVERY 12 HOURS SCHEDULED
Qty: 60 TABLET | Refills: 5 | Status: SHIPPED | OUTPATIENT
Start: 2025-01-15

## 2025-02-20 ENCOUNTER — TELEPHONE (OUTPATIENT)
Age: 70
End: 2025-02-20

## 2025-02-20 NOTE — TELEPHONE ENCOUNTER
Returned call to DME company. Patient will need to bring SD card to get compliance report.    Called the patient and asked him to bring SD card.  He is unsure how to take it out of machine. Told patient he could just bring along the CPAP machine and the DME representative can get card out.  Requested patient come in 15 minutes early so we can get report prior to his visit with Dr. Gregorio.

## 2025-02-20 NOTE — TELEPHONE ENCOUNTER
Patient called in he wanted to make Dr. Gregorio aware when they check SD card he hasn't used CPAP for about 1-2 weeks due to being on a cream from dermatologist that he applies to his face if he wears the mask it is chaffing   Treatment for cream should stop next Wednesday

## 2025-02-20 NOTE — TELEPHONE ENCOUNTER
Ervin, DME supplier for patient, has called to speak to Tania in regards a request of patient's 30 days compliance.    Please call Ervin back at [phone]

## 2025-02-24 ENCOUNTER — OFFICE VISIT (OUTPATIENT)
Dept: SLEEP CENTER | Facility: CLINIC | Age: 70
End: 2025-02-24
Payer: COMMERCIAL

## 2025-02-24 VITALS
WEIGHT: 235 LBS | RESPIRATION RATE: 16 BRPM | HEIGHT: 72 IN | BODY MASS INDEX: 31.83 KG/M2 | OXYGEN SATURATION: 92 % | DIASTOLIC BLOOD PRESSURE: 108 MMHG | HEART RATE: 75 BPM | SYSTOLIC BLOOD PRESSURE: 141 MMHG | TEMPERATURE: 98 F

## 2025-02-24 DIAGNOSIS — R09.81 NASAL CONGESTION: ICD-10-CM

## 2025-02-24 DIAGNOSIS — I10 ESSENTIAL HYPERTENSION: ICD-10-CM

## 2025-02-24 DIAGNOSIS — G47.9 SLEEP DISTURBANCE: ICD-10-CM

## 2025-02-24 DIAGNOSIS — E66.9 OBESITY (BMI 30-39.9): ICD-10-CM

## 2025-02-24 DIAGNOSIS — G47.33 OSA (OBSTRUCTIVE SLEEP APNEA): Primary | ICD-10-CM

## 2025-02-24 DIAGNOSIS — I48.92 PAROXYSMAL ATRIAL FLUTTER (HCC): ICD-10-CM

## 2025-02-24 PROCEDURE — 99214 OFFICE O/P EST MOD 30 MIN: CPT | Performed by: INTERNAL MEDICINE

## 2025-02-24 NOTE — PROGRESS NOTES
Name: Dominic Ndiaye      : 1955      MRN: [id number]  Encounter Provider: Jeff Gregorio MD  Encounter Date: 2025   Encounter department: [facility] SLEEP MEDICINE [place]  :  Assessment & Plan  CADY (obstructive sleep apnea)    Orders:    PAP DME Resupply/Reorder    Sleep disturbance         Nasal congestion         Essential hypertension         Paroxysmal atrial flutter (HCC)         Obesity (BMI 30-39.9)          PLAN:   Problems & Comorbidities Addressed this Visit as listed.  Above conditions as reviewed in notes are improved/stable/controlled/resolved.  I reviewed results of prior studies and physiologic data with the patient.   I discussed treatment options with risks and benefits.  Treatment with  PAP is medically necessary and Dominic is agreable to continue use.   Care of equipment, methods to improve comfort using PAP and importance of compliance with therapy were discussed.  Pressure setting: change 11-17 cmH2O. Mask type nasal/ full face -fit to comfort.  He may also benefit from Remzz.  Rx provided to replace supplies and Care coordinated with DME provider.   Discussed strategies for weight reduction.    Follow-up is advised in 3 months to monitor progress, compliance and to adjust therapy.      History of Present Illness   HPI         Follow-Up Note - Sleep Center   Dominic Ndiaye  69 y.o. male  :1955  MRN:[id number]  DOS:2025    CC: I saw this patient for follow-up in clinic today for Sleep disordered breathing, Coexisting Sleep and Medical Problems.  He is using a ResMed machine.. Interval changes: None reported.      PSG in  demonstrated severe obstructive sleep apnea: AHI 40 /hour , worse while supine.  Loud intensity  snoring  was noted there were also 5 respiratory effort-related arousals..  Minimum oxygen saturation 87 %  and 2.5 minutes of total sleep time was spent with saturations less than 90%.         PFSH, Problem List, Medications & Allergies were  reviewed in EMR.   He  has a past medical history of Atrial flutter (HCC), Hypertension, Mixed hyperlipidemia, and CADY (obstructive sleep apnea).    He has a current medication list which includes the following prescription(s): amlodipine, apixaban, cvs magnesium oxide, hydrochlorothiazide, losartan, and metoprolol tartrate.    PHYSIOLOGICAL DATA REVIEW : Device has been used 23/30 days and average on days used is 5 hours and 41 minutes.  He was not able to use CPAP for several days because he is being treated for dermatological condition with 5-fluorouracil.   using PAP > 4 hours/night 60%. Estimated VERONIKA 19.1/hour made up of mainly obstructive events with pressure of 14cm H2O and  there are mask leaks of 113.6 L/min.;.  INTERPRETATION: Compliance is good; Pressure setting is:in acceptable range and respiratory events unlikely related to mask leaks.; ;     SUBJECTIVE: With respect to use of PAP, Dominic  is experiencing some adverse effects: mask causes redness / facial marks .He derives benefit.. Is satisfied with sleep and daytime function.     Sleep Routine: Dominic reports getting 8 hrs sleep; he has no difficulty initiating or maintaining sleep .  Sleep is interrupted around 2 times a night because of nocturia but he is able to fall back asleep.  He arises needing an alarm and usually feels refreshed.Dominic]denies excessive daytime sleepiness,.  He rated himself at Total score: 2 /24 on the [place] Sleepiness Scale.   Other issues: None reported.     Habits: Reports that he has never smoked. He has never used smokeless tobacco.,  Reports that he does not currently use alcohol.,  Reports no history of drug use., Caffeine use: limited until  ; Exercise routine: irregular being limited by whether.      ROS: Significant for weight has been stable.  He has nasal congestion that clears when he applies CPAP.  He is reporting no cardiac symptoms..    EXAM: BP (!) 141/108 (BP Location: Left arm, Patient Position: Sitting,  "Cuff Size: Large)   Pulse 75   Temp 98 °F (36.7 °C) (Temporal)   Resp 16   Ht 6' (1.829 m)   Wt 107 kg (235 lb)   SpO2 92%   BMI 31.87 kg/m²     Wt Readings from Last 3 Encounters:   02/24/25 107 kg (235 lb)   06/25/24 108 kg (237 lb)   10/05/23 107 kg (235 lb 3.2 oz)      Patient is well groomed; well appearing.   CNS: Alert, orientated, speech clear & coherent  Psych: cooperative and in no distress. Mental state: Appears normal.  H&N: EOMI; NC/AT: No patchy facial erythema  Skin/Extrem: col & hydration normal; no edema  Resp: Respiratory effort is normal  Physical findings otherwise essentially unchanged from previous.    Sincerely,     Authenticated electronically on 02/24/25   Board Certified Specialist     Portions of the record may have been created with voice recognition software. Occasional wrong word or \"sound a like\" substitutions may have occurred due to the inherent limitations of voice recognition software. There may also be notations and random deletions of words or characters from malfunctioning software. Read the chart carefully and recognize, using context, where substitutions/deletions have occurred.      Sitting and reading: Slight chance of dozing  Watching TV: Would never doze  Sitting, inactive in a public place (e.g. a theatre or a meeting): Would never doze  As a passenger in a car for an hour without a break: Would never doze  Lying down to rest in the afternoon when circumstances permit: Slight chance of dozing  Sitting and talking to someone: Would never doze  Sitting quietly after a lunch without alcohol: Would never doze  In a car, while stopped for a few minutes in traffic: Would never doze  Total score: 2     Review of Systems  Pertinent positives/negatives included in HPI and also as noted:       Objective   BP (!) 141/108 (BP Location: Left arm, Patient Position: Sitting, Cuff Size: Large)   Pulse 75   Temp 98 °F (36.7 °C) (Temporal)   Resp 16   Ht 6' (1.829 m)   Wt 107 " "kg (235 lb)   SpO2 92%   BMI 31.87 kg/m²     Neck Circumference: 18.5  Physical Exam    Data  Lab Results   Component Value Date    HGB 14.0 01/03/2021    HCT 41.6 01/03/2021    MCV 88 01/03/2021      Lab Results   Component Value Date    CALCIUM 10 08/22/2024    K 4.6 08/22/2024    CO2 27 08/22/2024    CL 99 08/22/2024    BUN 16 08/22/2024    CREATININE 0.9 08/22/2024     No results found for: \"IRON\", \"TIBC\", \"FERRITIN\"  Lab Results   Component Value Date    AST 21 08/22/2024    ALT 28 08/22/2024         "

## 2025-02-25 ENCOUNTER — TELEPHONE (OUTPATIENT)
Dept: SLEEP CENTER | Facility: CLINIC | Age: 70
End: 2025-02-25

## 2025-03-24 DIAGNOSIS — I10 ESSENTIAL HYPERTENSION: ICD-10-CM

## 2025-03-25 RX ORDER — AMLODIPINE BESYLATE 5 MG/1
5 TABLET ORAL DAILY
Qty: 30 TABLET | Refills: 5 | Status: SHIPPED | OUTPATIENT
Start: 2025-03-25

## 2025-04-03 ENCOUNTER — RESULTS FOLLOW-UP (OUTPATIENT)
Dept: CARDIOLOGY CLINIC | Facility: CLINIC | Age: 70
End: 2025-04-03

## 2025-04-03 ENCOUNTER — TELEPHONE (OUTPATIENT)
Dept: CARDIOLOGY CLINIC | Facility: CLINIC | Age: 70
End: 2025-04-03

## 2025-04-03 ENCOUNTER — OFFICE VISIT (OUTPATIENT)
Dept: CARDIOLOGY CLINIC | Facility: CLINIC | Age: 70
End: 2025-04-03
Payer: COMMERCIAL

## 2025-04-03 ENCOUNTER — APPOINTMENT (OUTPATIENT)
Dept: LAB | Facility: HOSPITAL | Age: 70
End: 2025-04-03
Payer: COMMERCIAL

## 2025-04-03 VITALS
HEART RATE: 61 BPM | OXYGEN SATURATION: 97 % | BODY MASS INDEX: 32.91 KG/M2 | WEIGHT: 243 LBS | TEMPERATURE: 96.9 F | DIASTOLIC BLOOD PRESSURE: 72 MMHG | SYSTOLIC BLOOD PRESSURE: 136 MMHG | HEIGHT: 72 IN

## 2025-04-03 DIAGNOSIS — G47.33 OSA (OBSTRUCTIVE SLEEP APNEA): ICD-10-CM

## 2025-04-03 DIAGNOSIS — I48.92 ATRIAL FIBRILLATION AND FLUTTER (HCC): ICD-10-CM

## 2025-04-03 DIAGNOSIS — I48.91 ATRIAL FIBRILLATION AND FLUTTER (HCC): ICD-10-CM

## 2025-04-03 DIAGNOSIS — E78.2 MIXED HYPERLIPIDEMIA: ICD-10-CM

## 2025-04-03 DIAGNOSIS — I48.92 ATRIAL FIBRILLATION AND FLUTTER (HCC): Primary | ICD-10-CM

## 2025-04-03 DIAGNOSIS — I48.91 ATRIAL FIBRILLATION AND FLUTTER (HCC): Primary | ICD-10-CM

## 2025-04-03 DIAGNOSIS — I10 ESSENTIAL HYPERTENSION: ICD-10-CM

## 2025-04-03 LAB
ALBUMIN SERPL BCG-MCNC: 4.9 G/DL (ref 3.5–5)
ALP SERPL-CCNC: 43 U/L (ref 34–104)
ALT SERPL W P-5'-P-CCNC: 21 U/L (ref 7–52)
ANION GAP SERPL CALCULATED.3IONS-SCNC: 7 MMOL/L (ref 4–13)
AST SERPL W P-5'-P-CCNC: 18 U/L (ref 13–39)
BILIRUB SERPL-MCNC: 0.64 MG/DL (ref 0.2–1)
BUN SERPL-MCNC: 20 MG/DL (ref 5–25)
CALCIUM SERPL-MCNC: 9.7 MG/DL (ref 8.4–10.2)
CHLORIDE SERPL-SCNC: 102 MMOL/L (ref 96–108)
CO2 SERPL-SCNC: 29 MMOL/L (ref 21–32)
CREAT SERPL-MCNC: 0.94 MG/DL (ref 0.6–1.3)
ERYTHROCYTE [DISTWIDTH] IN BLOOD BY AUTOMATED COUNT: 12.7 % (ref 11.6–15.1)
GFR SERPL CREATININE-BSD FRML MDRD: 81 ML/MIN/1.73SQ M
GLUCOSE P FAST SERPL-MCNC: 116 MG/DL (ref 65–99)
HCT VFR BLD AUTO: 42.2 % (ref 36.5–49.3)
HGB BLD-MCNC: 14.4 G/DL (ref 12–17)
MAGNESIUM SERPL-MCNC: 2.1 MG/DL (ref 1.9–2.7)
MCH RBC QN AUTO: 29.6 PG (ref 26.8–34.3)
MCHC RBC AUTO-ENTMCNC: 34.1 G/DL (ref 31.4–37.4)
MCV RBC AUTO: 87 FL (ref 82–98)
PLATELET # BLD AUTO: 144 THOUSANDS/UL (ref 149–390)
PMV BLD AUTO: 11.6 FL (ref 8.9–12.7)
POTASSIUM SERPL-SCNC: 4.3 MMOL/L (ref 3.5–5.3)
PROT SERPL-MCNC: 7.4 G/DL (ref 6.4–8.4)
RBC # BLD AUTO: 4.86 MILLION/UL (ref 3.88–5.62)
SODIUM SERPL-SCNC: 138 MMOL/L (ref 135–147)
WBC # BLD AUTO: 6.92 THOUSAND/UL (ref 4.31–10.16)

## 2025-04-03 PROCEDURE — 36415 COLL VENOUS BLD VENIPUNCTURE: CPT

## 2025-04-03 PROCEDURE — 83735 ASSAY OF MAGNESIUM: CPT

## 2025-04-03 PROCEDURE — 80053 COMPREHEN METABOLIC PANEL: CPT

## 2025-04-03 PROCEDURE — 99214 OFFICE O/P EST MOD 30 MIN: CPT | Performed by: NURSE PRACTITIONER

## 2025-04-03 PROCEDURE — 85027 COMPLETE CBC AUTOMATED: CPT

## 2025-04-03 PROCEDURE — 93000 ELECTROCARDIOGRAM COMPLETE: CPT | Performed by: NURSE PRACTITIONER

## 2025-04-03 RX ORDER — METOPROLOL TARTRATE 50 MG
50 TABLET ORAL EVERY 12 HOURS SCHEDULED
Qty: 60 TABLET | Refills: 11 | Status: SHIPPED | OUTPATIENT
Start: 2025-04-03 | End: 2025-04-09

## 2025-04-03 NOTE — PROGRESS NOTES
Cardiology Follow Up    Dominic Peresterolhimanshu  1955  [id number]  [facility] [place] [address]  [address]  [facility] [address] [phone]    Dominic presents for routine follow up of paroxysmal atrial fibrillation, hypertension and hyperlipidemia.      1. Atrial fibrillation and flutter (HCC)  Assessment & Plan:  Atrial flutter diagnosed 1/2021, unclear duration.  Chads Vasc 2 on Eliquis 5 mg BID for anticoagulation.  BONG guided cardioversion 1/4/2021.   BONG 1/4/2021 EF 55-60% without significant valvular abnormalities.  14 day ZIO monitor 5/2021 revealed avg HR 48 bpm with 178 episodes of SVT, longest 16 beats.  Positive sleep study and now using CPAP.  Exercise stress test 4/23/2021 was negative for ischemia or exercise induced AF.  24 hour Holter monitor 3/3/22 showed SB with avg HR 49 bpm, 2 short runs of atrial tachycardia.  Metoprolol tartrate reduced from 50 to 25 mg BID.   ECG today shows rapid atrial fib at 132 bpm.   Remains asymptomatic with no anginal or heart failure. Recently had to stop using his CPAP, suspect this is a likely cause.  Labs today. Update echo to assess LVEF and valves.  Increase metoprolol to 50 mg BID.  Schedule cardioversion 4/9/2025. Instructed NO missed Eliquis doses prior to procedure.  Referral to EP for consideration of eventual ablation given underlying sinus bradycardia which limits up-titration of medication.  Orders:  -     Cardioversion; Future; Expected date: 04/09/2025  -     CBC and Platelet; Future  -     Comprehensive metabolic panel; Future  -     Magnesium; Future  -     Echo complete w/ contrast if indicated; Future; Expected date: 04/03/2025  -     Ambulatory referral to Cardiac Electrophysiology; Future  -     metoprolol tartrate (LOPRESSOR) 50 mg tablet; Take 1 tablet (50 mg total) by mouth every 12 (twelve) hours  -     POCT ECG  2. Essential hypertension  Assessment & Plan:  Blood pressure at  goal.  Continue amlodipine 5 mg daily, HCTZ 25 mg daily, losartan 100 mg daily.  Increase metoprolol tartrate to 50 mg BID due to rapid a fib.  Labs today.  Discussed benefits of weight loss, 2 g sodium diet, and regular exercise.  Orders:  -     CBC and Platelet; Future  3. Mixed hyperlipidemia  Assessment & Plan:  Goal LDL < 100.  Lipid panel 8/22/2024: . L 104.  Recommend addition of statin therapy. Would avoid prescription omega 3 due to risk for a fib.  We reviewed dietary modifications.  He will be going for updated labs through PCP. Recommend addition of low dose statin if labs remain unchanged.  4. CADY (obstructive sleep apnea)  Assessment & Plan:  Compliant with CPAP.  Follows with Dr. Gregorio with [facility] sleep medicine.  Reviewed importance of CADY management in the setting of history of atrial flutter.  5. BMI 32.0-32.9,adult  Assessment & Plan:  Body mass index is 32.96 kg/m².  Encouraged weight loss efforts       HPI   Dominic has a past medical history of paroxysmal atrial flutter on Eliquis, HTN, HLD, and CADY on CPAP.     He established with cardiology during a hospitalization to [facility] 1/2-1/5/2021 when he presented with rapid atrial flutter. He had gone to a local urgent care for urinary symptoms where he was diagnosed with UTI, but also found to be in atrial flutter with HR's in the 130's. He underwent BONG guided cardioversion on 1/4/21 and was converted to sinus rhythm. He was discharged home on Eliquis 5 mg BID and metoprolol tartrate 75 mg BID.     Exercise stress test 4/23/2021 showed no evidence of ischemia (full details below).  He had a ZIO monitor 5/2021 with avg HR 48 bpm and 178 episodes of SVT with the longest run 16 beats.      He was subsequently diagnosed with CADY and started CPAP therapy.     He was seen by me on 11/18/2021 at which time HCTZ was added for more optimal blood pressure control.      24 hour Holter monitor 3/3/2022 showed SB with avg HR 49 bpm, HR . 2 short runs  of asymptomatic atrial tachycardia were noted. His metoprolol tartrate was reduced from 50 to 25 mg BID. He followed up most recently 6/25/2024 at which time he was doing very well. He had declined an ECG at that visit.     4/3/2025: Dominic presents for routine follow up. He is feeling very well. He is surprised to learn that his ECG today shows a rapid atrial fibrillation with ventricular rate 132 bpm. He is completely asymptomatic. No chest discomfort, shortness of breath, fatigue, palpitations, edema, or lightheadedness. He reports good activity tolerance. He has been going for walks and working without any difficulty. He did recently have to stop using his CPAP because of a skin treatment by the dermatologist, but otherwise uses his CPAP faithfully. No ETOH intake. He takes his medications faithfully and denies any missed doses of Eliquis in the past month.     Medical Problems       Problem List       Coagulation disorder (HCC)    Bradycardia    Paroxysmal atrial flutter (HCC)    Essential hypertension    Mixed hyperlipidemia    CADY (obstructive sleep apnea)        Past Medical History:   Diagnosis Date    Atrial flutter (HCC)     Hypertension     Mixed hyperlipidemia     CADY (obstructive sleep apnea)      Social History     Socioeconomic History    Marital status: /Civil Union     Spouse name: Not on file    Number of children: Not on file    Years of education: Not on file    Highest education level: Not on file   Occupational History    Occupation: Retired     Occupation:      Comment: Works part time since retiring   Tobacco Use    Smoking status: Never    Smokeless tobacco: Never   Vaping Use    Vaping status: Never Used   Substance and Sexual Activity    Alcohol use: Not Currently    Drug use: Never    Sexual activity: Not on file     Comment: Defer   Other Topics Concern    Not on file   Social History Narrative    Not on file     Social Drivers of Health     Financial  Resource Strain: Low Risk  (8/17/2023)    Received from [facility]    Financial Resource Strain     Do you have any trouble paying for your medications, or do you think you might in the future?: No     Does your family have trouble paying for medicine? (Household - for ages 0-17 years): Not on file   Food Insecurity: No Food Insecurity (8/17/2023)    Received from [facility]    Hunger Vital Sign     Worried About Running Out of Food in the Last Year: Never true     Ran Out of Food in the Last Year: Never true   Transportation Needs: No Transportation Needs (8/17/2023)    Received from [facility]    Transportation Needs     READ ONLY Do you have trouble getting a ride to medical visits or work?: Never True     Does your family have a hard time getting a ride to doctors’ visits? (Household - for ages 0-17 years): Not on file     Has lack of transportation kept you from medical appointments, meetings, work, or from getting things needed for daily living? Check all that apply. (Adult - for ages 18 years and over): Not on file     Do you (or your family) have trouble finding or paying for a ride (transportation)? (Household - for ages 0-17 years): Not on file   Physical Activity: Not on file   Stress: Not on file   Social Connections: Unknown (8/18/2024)    Received from [facility]    Social Connections     How often do you feel lonely or isolated from those around you? (Adult - for ages 18 years and over): Not on file   Intimate Partner Violence: Not on file   Housing Stability: Low Risk  (8/17/2023)    Received from [facility]    Housing Stability     Do you currently live in a shelter or have no steady place to sleep at night?: No     READ ONLY Do you think you are at risk of becoming homeless?: No     Does your family worry about paying for your home or becoming homeless? (Household - for ages 0-17 years): Not on file     Are you homeless or worried that you might be in the future? (Adult - for ages 18 years and over):  Not on file     Are you (or your family) homeless or worried that you might be in the future? (Household - for ages 0-17 years): Not on file      Family History   Problem Relation Age of Onset    No Known Problems Mother     Hypertension Father      Past Surgical History:   Procedure Laterality Date    CARDIOVERSION  01/04/2021    TONSILLECTOMY  age 7    WISDOM TOOTH EXTRACTION         Current Outpatient Medications:     amLODIPine (NORVASC) 5 mg tablet, TAKE 1 TABLET (5 MG TOTAL) BY MOUTH DAILY., Disp: 30 tablet, Rfl: 5    apixaban (Eliquis) 5 mg, Take 1 tablet (5 mg total) by mouth 2 (two) times a day, Disp: 180 tablet, Rfl: 0    [facility] Magnesium Oxide 250 MG TABS, Take 1 tablet (250 mg total) by mouth daily, Disp: 90 tablet, Rfl: 3    hydroCHLOROthiazide 25 mg tablet, TAKE 1 TABLET (25 MG TOTAL) BY MOUTH DAILY., Disp: 90 tablet, Rfl: 1    losartan (COZAAR) 100 MG tablet, TAKE 1 TABLET BY MOUTH EVERY DAY, Disp: 30 tablet, Rfl: 11    metoprolol tartrate (LOPRESSOR) 50 mg tablet, Take 1 tablet (50 mg total) by mouth every 12 (twelve) hours, Disp: 60 tablet, Rfl: 11  No Known Allergies    Labs:     Chemistry        Component Value Date/Time    K 4.6 08/22/2024 0846    CL 99 08/22/2024 0846    CO2 27 08/22/2024 0846    BUN 16 08/22/2024 0846    CREATININE 0.9 08/22/2024 0846        Component Value Date/Time    CALCIUM 10 08/22/2024 0846    ALKPHOS 62 08/22/2024 0846    AST 21 08/22/2024 0846    ALT 28 08/22/2024 0846        Cardiac testing:  ECG 4/3/2025: Atrial fibrillation with  bpm. LAFB. LVH. Nonspecific ST/T wave abnormality.     Lipid panel 8/22/2024: C 185. T 282. H 31. L 104    ECG 5/30/2023:Sinus bradycardia. 59 bpm. LAFB. Non specific intraventricular conduction block.      24 hour Holter monitor 3/3/2022:  Predominantly sinus bradycardia, HR , avg 49 bpm.  7 PVC's. 344 PAC's. Longest R-R 2.4 seconds. 2 short runs of AT. No symptoms.     ECG 2/24/2022: Sinus bradycardia at 52 bpm. IVCD. Non  specific ST abnormality.      Lipid panel 7/29/2021: C 187. T 217. H 31. L 113.      ZIO 5/6-5/20/2021:  Min HR 32. Max . Avg HR 48.    One 4 beat run of VT.   178 episodes of SVT with the longest lasting 16 beats.   Occasional PAC's. Rare PVC's.      Exercise stress test 4/23/2021:  Artur protocol. 8:04. METs 10.1.  Frequent PAC's with short burst of probable AT and occasional couplet PVC's and one 3 beat run NSVT.  No evidence of ischemia.     BONG 1/4/2021:  EF 55-60%.  Mild LVH.   Left and right atrium mildly dilated.   Mild spontaneous echo contrast without evidence of left atrial appendage thrombus.   No ASD or PFO with bubble study.   Trace to mild MR. Trace to mild TR.    Review of Systems   Constitutional: Negative.   HENT: Negative.     Cardiovascular:  Negative for chest pain, dyspnea on exertion, irregular heartbeat, leg swelling, near-syncope, orthopnea and palpitations.   Respiratory:  Negative for cough and snoring.    Endocrine: Negative.    Skin: Negative.    Musculoskeletal: Negative.    Gastrointestinal: Negative.    Genitourinary: Negative.    Neurological: Negative.    Psychiatric/Behavioral: Negative.         Vitals:    04/03/25 0919   BP: 136/72   Pulse: 61   Temp: (!) 96.9 °F (36.1 °C)   SpO2: 97%     Vitals:    04/03/25 0919   Weight: 110 kg (243 lb)     Height: 6' (182.9 cm)   Body mass index is 32.96 kg/m².    Physical Exam  Vitals and nursing note reviewed.   Constitutional:       General: He is not in acute distress.     Appearance: He is well-developed. He is obese. He is not diaphoretic.   HENT:      Head: Normocephalic and atraumatic.   Neck:      Vascular: No JVD.   Cardiovascular:      Rate and Rhythm: Tachycardia present. Rhythm irregular.      Pulses: Intact distal pulses.      Heart sounds: Normal heart sounds, S1 normal and S2 normal. No murmur heard.     No friction rub. No gallop.      Comments: No edema  Pulmonary:      Effort: Pulmonary effort is normal. No respiratory  distress.      Breath sounds: Normal breath sounds.   Abdominal:      General: There is no distension.      Palpations: Abdomen is soft.      Tenderness: There is no abdominal tenderness.   Skin:     General: Skin is warm and dry.      Findings: No rash.   Neurological:      Mental Status: He is alert and oriented to person, place, and time.   Psychiatric:         Behavior: Behavior normal.

## 2025-04-03 NOTE — ASSESSMENT & PLAN NOTE
Goal LDL < 100.  Lipid panel 8/22/2024: . L 104.  Recommend addition of statin therapy. Would avoid prescription omega 3 due to risk for a fib.  We reviewed dietary modifications.  He will be going for updated labs through PCP. Recommend addition of low dose statin if labs remain unchanged.

## 2025-04-03 NOTE — ASSESSMENT & PLAN NOTE
Atrial flutter diagnosed 1/2021, unclear duration.  Chads Vasc 2 on Eliquis 5 mg BID for anticoagulation.  BONG guided cardioversion 1/4/2021.   BONG 1/4/2021 EF 55-60% without significant valvular abnormalities.  14 day ZIO monitor 5/2021 revealed avg HR 48 bpm with 178 episodes of SVT, longest 16 beats.  Positive sleep study and now using CPAP.  Exercise stress test 4/23/2021 was negative for ischemia or exercise induced AF.  24 hour Holter monitor 3/3/22 showed SB with avg HR 49 bpm, 2 short runs of atrial tachycardia.  Metoprolol tartrate reduced from 50 to 25 mg BID.   ECG today shows rapid atrial fib at 132 bpm.   Remains asymptomatic with no anginal or heart failure. Recently had to stop using his CPAP, suspect this is a likely cause.  Labs today. Update echo to assess LVEF and valves.  Increase metoprolol to 50 mg BID.  Schedule cardioversion 4/9/2025. Instructed NO missed Eliquis doses prior to procedure.  Referral to EP for consideration of eventual ablation given underlying sinus bradycardia which limits up-titration of medication.

## 2025-04-03 NOTE — ASSESSMENT & PLAN NOTE
Blood pressure at goal.  Continue amlodipine 5 mg daily, HCTZ 25 mg daily, losartan 100 mg daily.  Increase metoprolol tartrate to 50 mg BID due to rapid a fib.  Labs today.  Discussed benefits of weight loss, 2 g sodium diet, and regular exercise.

## 2025-04-03 NOTE — TELEPHONE ENCOUNTER
Spoke with pt. Reviewed lab results. Will monitor platelet count with next set of labs. No concerning findings.

## 2025-04-03 NOTE — PATIENT INSTRUCTIONS
You are in atrial fibrillation with rapid heart rates this morning.  Increase your metoprolol tartrate to 50 mg twice daily. I did send the new dose to [facility].  Monitor blood pressure at home.  Blood work today.  Cardioversion arranged for next week to restore regular rhythm.  DO NOT miss any Eliquis doses. Nothing to eat or drink after midnight before the cardioversion except for the Eliquis with sips of water the morning of your cardioversion. DO NOT take the metoprolol the morning of the cardioversion.   Referral to the electrophysiology to discuss eventual ablation as more permanent treatment.   Updated echocardiogram  Ok for walks but no extreme exertion. If you develop chest discomfort, lightheadedness, shortness of breath, or leg swelling proceed to the ER.

## 2025-04-03 NOTE — TELEPHONE ENCOUNTER
Hello! I placed a referral to EP for evaluation for a fib with consideration for ablation. Can you please schedule with one of your docs?  Thank you!

## 2025-04-03 NOTE — ASSESSMENT & PLAN NOTE
Compliant with CPAP.  Follows with Dr. Gregorio with [facility] sleep medicine.  Reviewed importance of CADY management in the setting of history of atrial flutter.

## 2025-04-03 NOTE — H&P (VIEW-ONLY)
Cardiology Follow Up    Dominic Peresterolhimanshu  1955  [id number]  [facility] [place] [address]  [address] FLO[address]  [facility] [address] [phone]    Dominic presents for routine follow up of paroxysmal atrial fibrillation, hypertension and hyperlipidemia.      1. Atrial fibrillation and flutter (HCC)  Assessment & Plan:  Atrial flutter diagnosed 1/2021, unclear duration.  Chads Vasc 2 on Eliquis 5 mg BID for anticoagulation.  BONG guided cardioversion 1/4/2021.   BONG 1/4/2021 EF 55-60% without significant valvular abnormalities.  14 day ZIO monitor 5/2021 revealed avg HR 48 bpm with 178 episodes of SVT, longest 16 beats.  Positive sleep study and now using CPAP.  Exercise stress test 4/23/2021 was negative for ischemia or exercise induced AF.  24 hour Holter monitor 3/3/22 showed SB with avg HR 49 bpm, 2 short runs of atrial tachycardia.  Metoprolol tartrate reduced from 50 to 25 mg BID.   ECG today shows rapid atrial fib at 132 bpm.   Remains asymptomatic with no anginal or heart failure. Recently had to stop using his CPAP, suspect this is a likely cause.  Labs today. Update echo to assess LVEF and valves.  Increase metoprolol to 50 mg BID.  Schedule cardioversion 4/9/2025. Instructed NO missed Eliquis doses prior to procedure.  Referral to EP for consideration of eventual ablation given underlying sinus bradycardia which limits up-titration of medication.  Orders:  -     Cardioversion; Future; Expected date: 04/09/2025  -     CBC and Platelet; Future  -     Comprehensive metabolic panel; Future  -     Magnesium; Future  -     Echo complete w/ contrast if indicated; Future; Expected date: 04/03/2025  -     Ambulatory referral to Cardiac Electrophysiology; Future  -     metoprolol tartrate (LOPRESSOR) 50 mg tablet; Take 1 tablet (50 mg total) by mouth every 12 (twelve) hours  -     POCT ECG  2. Essential hypertension  Assessment & Plan:  Blood pressure at  goal.  Continue amlodipine 5 mg daily, HCTZ 25 mg daily, losartan 100 mg daily.  Increase metoprolol tartrate to 50 mg BID due to rapid a fib.  Labs today.  Discussed benefits of weight loss, 2 g sodium diet, and regular exercise.  Orders:  -     CBC and Platelet; Future  3. Mixed hyperlipidemia  Assessment & Plan:  Goal LDL < 100.  Lipid panel 8/22/2024: . L 104.  Recommend addition of statin therapy. Would avoid prescription omega 3 due to risk for a fib.  We reviewed dietary modifications.  He will be going for updated labs through PCP. Recommend addition of low dose statin if labs remain unchanged.  4. CADY (obstructive sleep apnea)  Assessment & Plan:  Compliant with CPAP.  Follows with Dr. Gregorio with [facility] sleep medicine.  Reviewed importance of CADY management in the setting of history of atrial flutter.  5. BMI 32.0-32.9,adult  Assessment & Plan:  Body mass index is 32.96 kg/m².  Encouraged weight loss efforts       HPI   Dominic has a past medical history of paroxysmal atrial flutter on Eliquis, HTN, HLD, and CADY on CPAP.     He established with cardiology during a hospitalization to [facility] 1/2-1/5/2021 when he presented with rapid atrial flutter. He had gone to a local urgent care for urinary symptoms where he was diagnosed with UTI, but also found to be in atrial flutter with HR's in the 130's. He underwent BONG guided cardioversion on 1/4/21 and was converted to sinus rhythm. He was discharged home on Eliquis 5 mg BID and metoprolol tartrate 75 mg BID.     Exercise stress test 4/23/2021 showed no evidence of ischemia (full details below).  He had a ZIO monitor 5/2021 with avg HR 48 bpm and 178 episodes of SVT with the longest run 16 beats.      He was subsequently diagnosed with CADY and started CPAP therapy.     He was seen by me on 11/18/2021 at which time HCTZ was added for more optimal blood pressure control.      24 hour Holter monitor 3/3/2022 showed SB with avg HR 49 bpm, HR . 2 short runs  of asymptomatic atrial tachycardia were noted. His metoprolol tartrate was reduced from 50 to 25 mg BID. He followed up most recently 6/25/2024 at which time he was doing very well. He had declined an ECG at that visit.     4/3/2025: Dominic presents for routine follow up. He is feeling very well. He is surprised to learn that his ECG today shows a rapid atrial fibrillation with ventricular rate 132 bpm. He is completely asymptomatic. No chest discomfort, shortness of breath, fatigue, palpitations, edema, or lightheadedness. He reports good activity tolerance. He has been going for walks and working without any difficulty. He did recently have to stop using his CPAP because of a skin treatment by the dermatologist, but otherwise uses his CPAP faithfully. No ETOH intake. He takes his medications faithfully and denies any missed doses of Eliquis in the past month.     Medical Problems       Problem List       Coagulation disorder (HCC)    Bradycardia    Paroxysmal atrial flutter (HCC)    Essential hypertension    Mixed hyperlipidemia    CADY (obstructive sleep apnea)        Past Medical History:   Diagnosis Date    Atrial flutter (HCC)     Hypertension     Mixed hyperlipidemia     CADY (obstructive sleep apnea)      Social History     Socioeconomic History    Marital status: /Civil Union     Spouse name: Not on file    Number of children: Not on file    Years of education: Not on file    Highest education level: Not on file   Occupational History    Occupation: Retired     Occupation:      Comment: Works part time since retiring   Tobacco Use    Smoking status: Never    Smokeless tobacco: Never   Vaping Use    Vaping status: Never Used   Substance and Sexual Activity    Alcohol use: Not Currently    Drug use: Never    Sexual activity: Not on file     Comment: Defer   Other Topics Concern    Not on file   Social History Narrative    Not on file     Social Drivers of Health     Financial  Resource Strain: Low Risk  (8/17/2023)    Received from [employer]    Financial Resource Strain     Do you have any trouble paying for your medications, or do you think you might in the future?: No     Does your family have trouble paying for medicine? (Household - for ages 0-17 years): Not on file   Food Insecurity: No Food Insecurity (8/17/2023)    Received from [employer]    Hunger Vital Sign     Worried About Running Out of Food in the Last Year: Never true     Ran Out of Food in the Last Year: Never true   Transportation Needs: No Transportation Needs (8/17/2023)    Received from [employer]    Transportation Needs     READ ONLY Do you have trouble getting a ride to medical visits or work?: Never True     Does your family have a hard time getting a ride to doctors’ visits? (Household - for ages 0-17 years): Not on file     Has lack of transportation kept you from medical appointments, meetings, work, or from getting things needed for daily living? Check all that apply. (Adult - for ages 18 years and over): Not on file     Do you (or your family) have trouble finding or paying for a ride (transportation)? (Household - for ages 0-17 years): Not on file   Physical Activity: Not on file   Stress: Not on file   Social Connections: Unknown (8/18/2024)    Received from [employer]    Social Connections     How often do you feel lonely or isolated from those around you? (Adult - for ages 18 years and over): Not on file   Intimate Partner Violence: Not on file   Housing Stability: Low Risk  (8/17/2023)    Received from [employer]    Housing Stability     Do you currently live in a shelter or have no steady place to sleep at night?: No     READ ONLY Do you think you are at risk of becoming homeless?: No     Does your family worry about paying for your home or becoming homeless? (Household - for ages 0-17 years): Not on file     Are you homeless or worried that you might be in the future? (Adult - for ages 18 years and over):  Not on file     Are you (or your family) homeless or worried that you might be in the future? (Household - for ages 0-17 years): Not on file      Family History   Problem Relation Age of Onset    No Known Problems Mother     Hypertension Father      Past Surgical History:   Procedure Laterality Date    CARDIOVERSION  01/04/2021    TONSILLECTOMY  age 7    WISDOM TOOTH EXTRACTION         Current Outpatient Medications:     amLODIPine (NORVASC) 5 mg tablet, TAKE 1 TABLET (5 MG TOTAL) BY MOUTH DAILY., Disp: 30 tablet, Rfl: 5    apixaban (Eliquis) 5 mg, Take 1 tablet (5 mg total) by mouth 2 (two) times a day, Disp: 180 tablet, Rfl: 0    [employer] Magnesium Oxide 250 MG TABS, Take 1 tablet (250 mg total) by mouth daily, Disp: 90 tablet, Rfl: 3    hydroCHLOROthiazide 25 mg tablet, TAKE 1 TABLET (25 MG TOTAL) BY MOUTH DAILY., Disp: 90 tablet, Rfl: 1    losartan (COZAAR) 100 MG tablet, TAKE 1 TABLET BY MOUTH EVERY DAY, Disp: 30 tablet, Rfl: 11    metoprolol tartrate (LOPRESSOR) 50 mg tablet, Take 1 tablet (50 mg total) by mouth every 12 (twelve) hours, Disp: 60 tablet, Rfl: 11  No Known Allergies    Labs:     Chemistry        Component Value Date/Time    K 4.6 08/22/2024 0846    CL 99 08/22/2024 0846    CO2 27 08/22/2024 0846    BUN 16 08/22/2024 0846    CREATININE 0.9 08/22/2024 0846        Component Value Date/Time    CALCIUM 10 08/22/2024 0846    ALKPHOS 62 08/22/2024 0846    AST 21 08/22/2024 0846    ALT 28 08/22/2024 0846        Cardiac testing:  ECG 4/3/2025: Atrial fibrillation with  bpm. LAFB. LVH. Nonspecific ST/T wave abnormality.     Lipid panel 8/22/2024: C 185. T 282. H 31. L 104    ECG 5/30/2023:Sinus bradycardia. 59 bpm. LAFB. Non specific intraventricular conduction block.      24 hour Holter monitor 3/3/2022:  Predominantly sinus bradycardia, HR , avg 49 bpm.  7 PVC's. 344 PAC's. Longest R-R 2.4 seconds. 2 short runs of AT. No symptoms.     ECG 2/24/2022: Sinus bradycardia at 52 bpm. IVCD. Non  specific ST abnormality.      Lipid panel 7/29/2021: C 187. T 217. H 31. L 113.      ZIO 5/6-5/20/2021:  Min HR 32. Max . Avg HR 48.    One 4 beat run of VT.   178 episodes of SVT with the longest lasting 16 beats.   Occasional PAC's. Rare PVC's.      Exercise stress test 4/23/2021:  Artur protocol. 8:04. METs 10.1.  Frequent PAC's with short burst of probable AT and occasional couplet PVC's and one 3 beat run NSVT.  No evidence of ischemia.     BONG 1/4/2021:  EF 55-60%.  Mild LVH.   Left and right atrium mildly dilated.   Mild spontaneous echo contrast without evidence of left atrial appendage thrombus.   No ASD or PFO with bubble study.   Trace to mild MR. Trace to mild TR.    Review of Systems   Constitutional: Negative.   HENT: Negative.     Cardiovascular:  Negative for chest pain, dyspnea on exertion, irregular heartbeat, leg swelling, near-syncope, orthopnea and palpitations.   Respiratory:  Negative for cough and snoring.    Endocrine: Negative.    Skin: Negative.    Musculoskeletal: Negative.    Gastrointestinal: Negative.    Genitourinary: Negative.    Neurological: Negative.    Psychiatric/Behavioral: Negative.         Vitals:    04/03/25 0919   BP: 136/72   Pulse: 61   Temp: (!) 96.9 °F (36.1 °C)   SpO2: 97%     Vitals:    04/03/25 0919   Weight: 110 kg (243 lb)     Height: 6' (182.9 cm)   Body mass index is 32.96 kg/m².    Physical Exam  Vitals and nursing note reviewed.   Constitutional:       General: He is not in acute distress.     Appearance: He is well-developed. He is obese. He is not diaphoretic.   HENT:      Head: Normocephalic and atraumatic.   Neck:      Vascular: No JVD.   Cardiovascular:      Rate and Rhythm: Tachycardia present. Rhythm irregular.      Pulses: Intact distal pulses.      Heart sounds: Normal heart sounds, S1 normal and S2 normal. No murmur heard.     No friction rub. No gallop.      Comments: No edema  Pulmonary:      Effort: Pulmonary effort is normal. No respiratory  distress.      Breath sounds: Normal breath sounds.   Abdominal:      General: There is no distension.      Palpations: Abdomen is soft.      Tenderness: There is no abdominal tenderness.   Skin:     General: Skin is warm and dry.      Findings: No rash.   Neurological:      Mental Status: He is alert and oriented to person, place, and time.   Psychiatric:         Behavior: Behavior normal.

## 2025-04-04 ENCOUNTER — TELEPHONE (OUTPATIENT)
Age: 70
End: 2025-04-04

## 2025-04-04 NOTE — TELEPHONE ENCOUNTER
Spoke with pt. He knows to take his Eliquis before the cardioversion with NO missed doses in the next week. He will take losartan prior to the procedure as well.  He will not take the metoprolol, HCTZ, or amlodipine the morning of his cardioversion.    Please cancel the July appt with me and move out to September. Thank you!

## 2025-04-04 NOTE — TELEPHONE ENCOUNTER
Received call from pt's with a few questions for Michelle CASTANEDA.  PT knows he needs to hold his Eliquis and metoprolol the morning of his cardioversion but he is not sure about the amlodipine, HCTZ, and losartan.  Pt would also like Michelle to know the earliest he was able to get in with Dr Claire was 7//25.  Pt would like to make sure that is ok as he has an appointment with Michelle the following day 7/10/25.

## 2025-04-07 ENCOUNTER — TELEPHONE (OUTPATIENT)
Dept: NON INVASIVE DIAGNOSTICS | Facility: HOSPITAL | Age: 70
End: 2025-04-07

## 2025-04-07 NOTE — TELEPHONE ENCOUNTER
Spoke to pt and given the following instructions:  NPO after MN  Will need a   Arrival time 0800  Take meds as instructed by Michelle Grande  (No Metoprolol) with sip of water

## 2025-04-09 ENCOUNTER — ANESTHESIA EVENT (OUTPATIENT)
Dept: NON INVASIVE DIAGNOSTICS | Facility: HOSPITAL | Age: 70
End: 2025-04-09
Payer: COMMERCIAL

## 2025-04-09 ENCOUNTER — HOSPITAL ENCOUNTER (OUTPATIENT)
Dept: NON INVASIVE DIAGNOSTICS | Facility: HOSPITAL | Age: 70
Discharge: HOME | End: 2025-04-09
Payer: COMMERCIAL

## 2025-04-09 ENCOUNTER — ANESTHESIA (OUTPATIENT)
Dept: NON INVASIVE DIAGNOSTICS | Facility: HOSPITAL | Age: 70
End: 2025-04-09
Payer: COMMERCIAL

## 2025-04-09 VITALS
SYSTOLIC BLOOD PRESSURE: 104 MMHG | OXYGEN SATURATION: 98 % | BODY MASS INDEX: 32.91 KG/M2 | TEMPERATURE: 97.4 F | DIASTOLIC BLOOD PRESSURE: 66 MMHG | HEART RATE: 51 BPM | WEIGHT: 243 LBS | HEIGHT: 72 IN | RESPIRATION RATE: 18 BRPM

## 2025-04-09 DIAGNOSIS — I48.92 ATRIAL FIBRILLATION AND FLUTTER (HCC): ICD-10-CM

## 2025-04-09 DIAGNOSIS — I48.91 ATRIAL FIBRILLATION AND FLUTTER (HCC): ICD-10-CM

## 2025-04-09 LAB
ATRIAL RATE: 59 BPM
ATRIAL RATE: 78 BPM
P AXIS: 71 DEGREES
PR INTERVAL: 210 MS
QRS AXIS: -63 DEGREES
QRS AXIS: -63 DEGREES
QRSD INTERVAL: 120 MS
QRSD INTERVAL: 128 MS
QT INTERVAL: 346 MS
QT INTERVAL: 464 MS
QTC INTERVAL: 459 MS
QTC INTERVAL: 526 MS
T WAVE AXIS: -85 DEGREES
T WAVE AXIS: 112 DEGREES
VENTRICULAR RATE: 139 BPM
VENTRICULAR RATE: 59 BPM

## 2025-04-09 PROCEDURE — 93005 ELECTROCARDIOGRAM TRACING: CPT

## 2025-04-09 PROCEDURE — 92960 CARDIOVERSION ELECTRIC EXT: CPT

## 2025-04-09 PROCEDURE — 92960 CARDIOVERSION ELECTRIC EXT: CPT | Performed by: INTERNAL MEDICINE

## 2025-04-09 PROCEDURE — 93010 ELECTROCARDIOGRAM REPORT: CPT | Performed by: INTERNAL MEDICINE

## 2025-04-09 RX ORDER — PHENYLEPHRINE HCL IN 0.9% NACL 1 MG/10 ML
SYRINGE (ML) INTRAVENOUS AS NEEDED
Status: DISCONTINUED | OUTPATIENT
Start: 2025-04-09 | End: 2025-04-09

## 2025-04-09 RX ORDER — PROPOFOL 10 MG/ML
INJECTION, EMULSION INTRAVENOUS AS NEEDED
Status: DISCONTINUED | OUTPATIENT
Start: 2025-04-09 | End: 2025-04-09

## 2025-04-09 RX ORDER — LIDOCAINE HYDROCHLORIDE 10 MG/ML
INJECTION, SOLUTION EPIDURAL; INFILTRATION; INTRACAUDAL; PERINEURAL AS NEEDED
Status: DISCONTINUED | OUTPATIENT
Start: 2025-04-09 | End: 2025-04-09

## 2025-04-09 RX ORDER — METOPROLOL TARTRATE 25 MG/1
25 TABLET, FILM COATED ORAL EVERY 12 HOURS SCHEDULED
Qty: 180 TABLET | Refills: 3 | Status: SHIPPED | OUTPATIENT
Start: 2025-04-09

## 2025-04-09 RX ORDER — MIDAZOLAM HYDROCHLORIDE 2 MG/2ML
INJECTION, SOLUTION INTRAMUSCULAR; INTRAVENOUS AS NEEDED
Status: DISCONTINUED | OUTPATIENT
Start: 2025-04-09 | End: 2025-04-09

## 2025-04-09 RX ORDER — SODIUM CHLORIDE, SODIUM LACTATE, POTASSIUM CHLORIDE, CALCIUM CHLORIDE 600; 310; 30; 20 MG/100ML; MG/100ML; MG/100ML; MG/100ML
INJECTION, SOLUTION INTRAVENOUS CONTINUOUS PRN
Status: DISCONTINUED | OUTPATIENT
Start: 2025-04-09 | End: 2025-04-09

## 2025-04-09 RX ADMIN — SODIUM CHLORIDE, SODIUM LACTATE, POTASSIUM CHLORIDE, AND CALCIUM CHLORIDE: .6; .31; .03; .02 INJECTION, SOLUTION INTRAVENOUS at 08:20

## 2025-04-09 RX ADMIN — PROPOFOL 80 MG: 10 INJECTION, EMULSION INTRAVENOUS at 08:48

## 2025-04-09 RX ADMIN — Medication 100 MCG: at 08:51

## 2025-04-09 RX ADMIN — LIDOCAINE HYDROCHLORIDE 50 MG: 10 INJECTION, SOLUTION EPIDURAL; INFILTRATION; INTRACAUDAL; PERINEURAL at 08:48

## 2025-04-09 RX ADMIN — MIDAZOLAM HYDROCHLORIDE 2 MG: 1 INJECTION, SOLUTION INTRAMUSCULAR; INTRAVENOUS at 08:36

## 2025-04-09 NOTE — ANESTHESIA POSTPROCEDURE EVALUATION
Post-Op Assessment Note    CV Status:  Stable  Pain Score: 0    Pain management: adequate       Mental Status:  Alert and awake   Hydration Status:  Euvolemic   PONV Controlled:  Controlled   Airway Patency:  Patent     Post Op Vitals Reviewed: Yes    No anethesia notable event occurred.    Staff: CRNA           Last Filed PACU Vitals:  Vitals Value Taken Time   Temp     Pulse 50 04/09/25 0855   /70 04/09/25 0855   Resp 19 04/09/25 0855   SpO2 100 % 04/09/25 0855   Vitals shown include unfiled device data.

## 2025-04-09 NOTE — INTERVAL H&P NOTE
Update: (This section must be completed if the H&P was completed greater than 24 hrs to procedure or admission)    H&P reviewed. After examining the patient, I find no changed to the H&P since it had been written.  Patient with recurrent persistent atrial fibrillation.  Will proceed with DC cardioversion patient has been on long-term oral anticoagulation without interruption.    Patient re-evaluated. Accept as history and physical.    Galo Lester MD/April 9, 2025/8:08 AM

## 2025-04-09 NOTE — DISCHARGE INSTR - AVS FIRST PAGE
Continue all of your previous medications except the metoprolol which we have reduced to 25 mg twice a day.

## 2025-04-09 NOTE — ANESTHESIA POSTPROCEDURE EVALUATION
Post-Op Assessment Note    Last Filed PACU Vitals:  Vitals Value Taken Time   Temp 97.3 °F (36.3 °C) 04/09/25 0910   Pulse 60 04/09/25 0910   /79 04/09/25 0910   Resp 16 04/09/25 0910   SpO2 97 % 04/09/25 0910       Modified Navneet:     Vitals Value Taken Time   Activity 2 04/09/25 0910   Respiration 2 04/09/25 0910   Circulation 2 04/09/25 0910   Consciousness 2 04/09/25 0910   Oxygen Saturation 2 04/09/25 0910     Modified Navneet Score: 10

## 2025-04-09 NOTE — ANESTHESIA PREPROCEDURE EVALUATION
Procedure:  CARDIOVERSION    Relevant Problems   CARDIO   (+) Atrial fibrillation and flutter (HCC)   (+) Essential hypertension   (+) Mixed hyperlipidemia      HEMATOLOGY   (+) Coagulation disorder (HCC)      PULMONARY   (+) CADY (obstructive sleep apnea)        Physical Exam    Airway    Mallampati score: II  TM Distance: >3 FB  Neck ROM: full     Dental   No notable dental hx     Cardiovascular  Cardiovascular exam normal    Pulmonary  Pulmonary exam normal     Other Findings        Anesthesia Plan  ASA Score- 3     Anesthesia Type- IV sedation with anesthesia with ASA Monitors.         Additional Monitors:     Airway Plan:            Plan Factors-Exercise tolerance (METS): >4 METS.    Chart reviewed. EKG reviewed. Imaging results reviewed. Existing labs reviewed. Patient summary reviewed.    Patient is not a current smoker.      Obstructive sleep apnea risk education given perioperatively.        Induction- intravenous.    Postoperative Plan-     Perioperative Resuscitation Plan - Level 1 - Full Code.       Informed Consent- Anesthetic plan and risks discussed with patient.  I personally reviewed this patient with the CRNA. Discussed and agreed on the Anesthesia Plan with the CRNA..      NPO Status:  Vitals Value Taken Time   Date of last liquid 04/09/25 04/09/25 0747   Time of last liquid 0530 04/09/25 0747   Date of last solid 04/08/25 04/09/25 0747   Time of last solid 1700 04/09/25 0747

## 2025-04-17 ENCOUNTER — HOSPITAL ENCOUNTER (OUTPATIENT)
Dept: NON INVASIVE DIAGNOSTICS | Facility: HOSPITAL | Age: 70
Discharge: HOME | End: 2025-04-17
Payer: COMMERCIAL

## 2025-04-17 VITALS
BODY MASS INDEX: 32.91 KG/M2 | DIASTOLIC BLOOD PRESSURE: 66 MMHG | WEIGHT: 243 LBS | HEIGHT: 72 IN | SYSTOLIC BLOOD PRESSURE: 104 MMHG | HEART RATE: 70 BPM

## 2025-04-17 DIAGNOSIS — I48.92 ATRIAL FIBRILLATION AND FLUTTER (HCC): ICD-10-CM

## 2025-04-17 DIAGNOSIS — I48.91 ATRIAL FIBRILLATION AND FLUTTER (HCC): ICD-10-CM

## 2025-04-17 LAB
AORTIC ROOT: 3.1 CM
ASCENDING AORTA: 3.4 CM
BSA FOR ECHO PROCEDURE: 2.31 M2
E WAVE DECELERATION TIME: 217 MS
E/A RATIO: 1.2
FRACTIONAL SHORTENING: 58 (ref 28–44)
INTERVENTRICULAR SEPTUM IN DIASTOLE (PARASTERNAL SHORT AXIS VIEW): 0.9 CM
INTERVENTRICULAR SEPTUM: 0.9 CM (ref 0.6–1.1)
LAAS-AP2: 19.4 CM2
LAAS-AP4: 22.8 CM2
LEFT ATRIUM SIZE: 4.1 CM
LEFT ATRIUM VOLUME (MOD BIPLANE): 64 ML
LEFT ATRIUM VOLUME INDEX (MOD BIPLANE): 27.7 ML/M2
LEFT INTERNAL DIMENSION IN SYSTOLE: 2 CM (ref 2.1–4)
LEFT VENTRICLE DIASTOLIC VOLUME (MOD BIPLANE): 86 ML
LEFT VENTRICLE DIASTOLIC VOLUME INDEX (MOD BIPLANE): 37.2 ML/M2
LEFT VENTRICLE SYSTOLIC VOLUME (MOD BIPLANE): 30 ML
LEFT VENTRICLE SYSTOLIC VOLUME INDEX (MOD BIPLANE): 13 ML/M2
LEFT VENTRICULAR INTERNAL DIMENSION IN DIASTOLE: 4.8 CM (ref 3.5–6)
LEFT VENTRICULAR POSTERIOR WALL IN END DIASTOLE: 0.9 CM
LEFT VENTRICULAR STROKE VOLUME: 97 ML
LV EF BIPLANE MOD: 66 %
LV EF US.2D.A4C+ESTIMATED: 62 %
LVSV (TEICH): 97 ML
MV E'TISSUE VEL-LAT: 5 CM/S
MV E'TISSUE VEL-SEP: 5 CM/S
MV PEAK A VEL: 0.81 M/S
MV PEAK E VEL: 97 CM/S
MV STENOSIS PRESSURE HALF TIME: 63 MS
MV VALVE AREA P 1/2 METHOD: 3.49
RIGHT ATRIUM AREA SYSTOLE A4C: 19 CM2
RIGHT VENTRICLE ID DIMENSION: 4.8 CM
SL CV LEFT ATRIUM LENGTH A2C: 5.6 CM
SL CV LV EF: 60
SL CV PED ECHO LEFT VENTRICLE DIASTOLIC VOLUME (MOD BIPLANE) 2D: 110 ML
SL CV PED ECHO LEFT VENTRICLE SYSTOLIC VOLUME (MOD BIPLANE) 2D: 13 ML
TR MAX PG: 31 MMHG
TR PEAK VELOCITY: 2.8 M/S
TRICUSPID ANNULAR PLANE SYSTOLIC EXCURSION: 3.4 CM
TRICUSPID VALVE PEAK REGURGITATION VELOCITY: 2.78 M/S

## 2025-04-17 PROCEDURE — 93306 TTE W/DOPPLER COMPLETE: CPT

## 2025-04-30 DIAGNOSIS — I48.92 ATRIAL FLUTTER (HCC): ICD-10-CM

## 2025-05-01 RX ORDER — APIXABAN 5 MG/1
5 TABLET, FILM COATED ORAL 2 TIMES DAILY
Qty: 180 TABLET | Refills: 1 | Status: SHIPPED | OUTPATIENT
Start: 2025-05-01

## 2025-05-18 DIAGNOSIS — I10 ESSENTIAL HYPERTENSION: ICD-10-CM

## 2025-05-18 RX ORDER — HYDROCHLOROTHIAZIDE 25 MG/1
25 TABLET ORAL DAILY
Qty: 30 TABLET | Refills: 5 | Status: SHIPPED | OUTPATIENT
Start: 2025-05-18

## 2025-05-29 ENCOUNTER — TELEPHONE (OUTPATIENT)
Age: 70
End: 2025-05-29

## 2025-05-29 ENCOUNTER — OFFICE VISIT (OUTPATIENT)
Dept: SLEEP CENTER | Facility: CLINIC | Age: 70
End: 2025-05-29
Payer: COMMERCIAL

## 2025-05-29 VITALS
OXYGEN SATURATION: 98 % | BODY MASS INDEX: 31.97 KG/M2 | HEART RATE: 60 BPM | WEIGHT: 236 LBS | DIASTOLIC BLOOD PRESSURE: 81 MMHG | SYSTOLIC BLOOD PRESSURE: 162 MMHG | RESPIRATION RATE: 16 BRPM | HEIGHT: 72 IN | TEMPERATURE: 98.2 F

## 2025-05-29 DIAGNOSIS — I48.91 ATRIAL FIBRILLATION AND FLUTTER (HCC): ICD-10-CM

## 2025-05-29 DIAGNOSIS — G47.33 OSA (OBSTRUCTIVE SLEEP APNEA): Primary | ICD-10-CM

## 2025-05-29 DIAGNOSIS — R09.81 NASAL CONGESTION: ICD-10-CM

## 2025-05-29 DIAGNOSIS — F51.01 PRIMARY INSOMNIA: ICD-10-CM

## 2025-05-29 DIAGNOSIS — I48.92 ATRIAL FIBRILLATION AND FLUTTER (HCC): ICD-10-CM

## 2025-05-29 PROCEDURE — 99214 OFFICE O/P EST MOD 30 MIN: CPT | Performed by: STUDENT IN AN ORGANIZED HEALTH CARE EDUCATION/TRAINING PROGRAM

## 2025-05-29 RX ORDER — FLUTICASONE PROPIONATE 50 MCG
1 SPRAY, SUSPENSION (ML) NASAL
Qty: 9.9 ML | Refills: 11 | Status: SHIPPED | OUTPATIENT
Start: 2025-05-29 | End: 2025-06-20

## 2025-05-29 NOTE — PROGRESS NOTES
Name: Dominic Ndiaye      : 1955      MRN: [id number]  Encounter Provider: Isaac Jimenez DO  Encounter Date: 2025   Encounter department: [facility] [place]  :  Assessment & Plan  CADY (obstructive sleep apnea)  Dominic is a pleasant 69-year-old gentleman with a PMHx of atrial fibrillation and flutter, HTN, coagulation disorder, HLD who presents in follow up for CADY (AHI 40, O2 luis fernando 87% 2021).  While he endorses substantial benefit to PAP therapy, unfortunately, his residual AHI is still quite high.  Moreover, his device is flagging some Cheyne-Thapa respirations.    Compliance report reviewed and analyzed  Continue AutoPAP at settings of 10-17 cmH2O  Order placed for a formal mask fitting   Order placed for a formal PAP Titration Study ; if simply adjusting/changing his mask is enough to correct the AHI, we could always cancel this.  Prescription for new supplies ordered today  Reviewed CMS/insurance requirements and resupply guidelines  Information provided on the above as well as general maintenance steps  Recommended maintaining good Sleep Hygiene    Orders:    PAP DME Resupply/Reorder    fluticasone (FLONASE) 50 mcg/act nasal spray; 1 spray into each nostril daily at bedtime as needed for rhinitis    Mask fitting only; Future    CPAP Titration Study; Future    Primary insomnia  He does sound to have an intermittent sleep onset insomnia, however this is sporadic and certainly not every night.    We did discuss the possibility of as needed medications for these rare nights, however he would like to hold off and simply monitor this for now, which is certainly reasonable.         Nasal congestion  He does endorse some intermittent nasal congestion which does impact his ability to utilize the device.    Prescription placed for Flonase for as needed use on these nights.    Orders:    fluticasone (FLONASE) 50 mcg/act nasal spray; 1 spray into each nostril daily at bedtime as needed  for rhinitis    Atrial fibrillation and flutter (HCC)    Reviewed the importance of treating SDB on cardiovascular risk reduction (including but not limited to impaired BP control, risk of heart attack, CHF, and both initial occurrence of A-fib and recurrence of A-fib following ablation or similar intervention)  Defer primary/medical management of patient's atrial fibrillation per cardiology and/or patient's PCP.             Follow-up:  He will No follow-ups on file.      ________________________________________________________________________________________________    Per Last Visit Note (Date: 2/24/2025):  PLAN:   Problems & Comorbidities Addressed this Visit as listed.  Above conditions as reviewed in notes are improved/stable/controlled/resolved.  I reviewed results of prior studies and physiologic data with the patient.   I discussed treatment options with risks and benefits.  Treatment with  PAP is medically necessary and Dominic is agreable to continue use.   Care of equipment, methods to improve comfort using PAP and importance of compliance with therapy were discussed.  Pressure setting: change 11-17 cmH2O. Mask type nasal/ full face -fit to comfort.  He may also benefit from Remzz.  Rx provided to replace supplies and Care coordinated with DME provider.   Discussed strategies for weight reduction.    Follow-up is advised in 3 months to monitor progress, compliance and to adjust therapy.      Sleep Studies:  -PSG 4/9/2021: BMI: 31.9. TST: 223.5 minutes, Sleep efficiency: 49.9%. Sleep Onset Latency: 13 minutes, REM Onset Latency: 79.5 minutes. AHI: 40, Supine AHI: 55.6, REM AHI: 3.7. O2 luis fernando: 87%, Time below 90%: 2.5 minutes. PLM Index: 0, PLM Arousal Index: 0.       -Echocardiogram 4/17/2025: Ejection fraction 60%    ________________________________________________________________________________________________      Interval History: Dominic Ndiaye is a 69 y.o. male with a PMHx of atrial fibrillation  and flutter, HTN, coagulation disorder, HLD who presents in follow up for CADY (AHI 40, O2 luis fernando 87% 4/2021).    SDB:  -Current experience with PAP Therapy: Definitely beneficial  -Mask type: Nasal   -Difficulties with mask: Swapped from FFM to Nasal, and does feel it is working better, but sometimes it rides up into his face/nose. Sometimes gets nasal congestion, makes it difficult to use.   -Device: ResMed AirSense 10; received ~7/2021.  -Difficulties with device: Denies  -DME: [facility]  -Compliance:                SLEEP SCHEDULE:  Bedtime: 2100   Time it takes to fall sleep: Some nights it can be quick (on nights when he works), however rarely it can be 46-60 minutes; during that time he is just laying there thinking. States this is not bothersome at this time. Does state that this is <3 nights/week.   Wake up Time: 0822-3029     Estimated total sleep time (in a 24 hour period of time): ~6-8 hours       Changes to PMH, PSH, SH: See above       Sitting and reading: Would never doze  Watching TV: Would never doze  Sitting, inactive in a public place (e.g. a theatre or a meeting): Would never doze  As a passenger in a car for an hour without a break: Would never doze  Lying down to rest in the afternoon when circumstances permit: Slight chance of dozing  Sitting and talking to someone: Would never doze  Sitting quietly after a lunch without alcohol: Would never doze  In a car, while stopped for a few minutes in traffic: Would never doze  Total score: 1     Review of Systems  Pertinent positives/negatives included in HPI and also as noted:     Medications Ordered Prior to Encounter[1]   Objective   /81 (BP Location: Left arm, Patient Position: Sitting, Cuff Size: Large)   Pulse 60   Temp 98.2 °F (36.8 °C) (Temporal)   Resp 16   Ht 6' (1.829 m)   Wt 107 kg (236 lb)   SpO2 98%   BMI 32.01 kg/m²     Neck Circumference: 17.5  Physical Exam  Visit Vitals  /81 (BP Location: Left arm, Patient Position:  "Sitting, Cuff Size: Large)   Pulse 60   Temp 98.2 °F (36.8 °C) (Temporal)   Resp 16   Ht 6' (1.829 m)   Wt 107 kg (236 lb)   SpO2 98%   BMI 32.01 kg/m²   Smoking Status Never   BSA 2.29 m²     PHYSICAL EXAMINATION:  Vital Signs: /81 (BP Location: Left arm, Patient Position: Sitting, Cuff Size: Large)   Pulse 60   Temp 98.2 °F (36.8 °C) (Temporal)   Resp 16   Ht 6' (1.829 m)   Wt 107 kg (236 lb)   SpO2 98%   BMI 32.01 kg/m²     Constitutional: NAD, well appearing   Mental Status: AAOx3  Skin: Warm, dry, no rashes noted   Eyes: PERRL, normal conjunctiva  Chest: No evidence of respiratory distress, no accessory muscle use; no evidence of peripheral cyanosis  Abdomen: Soft, NT/ND  Extremities: No digital clubbing or pedal edema  Neuro: Strength 5/5 throughout, sensation grossly intact      Data  Lab Results   Component Value Date    HGB 14.4 04/03/2025    HCT 42.2 04/03/2025    MCV 87 04/03/2025      Lab Results   Component Value Date    CALCIUM 9.7 04/03/2025    K 4.3 04/03/2025    CO2 29 04/03/2025     04/03/2025    BUN 20 04/03/2025    CREATININE 0.94 04/03/2025     No results found for: \"IRON\", \"TIBC\", \"FERRITIN\"  Lab Results   Component Value Date    AST 18 04/03/2025    ALT 21 04/03/2025         Electronically signed by:    Isaac Jimenez DO  Board-Certified Neurology and Sleep Medicine  [facility]  05/29/25       [1]   Current Outpatient Medications on File Prior to Visit   Medication Sig Dispense Refill    amLODIPine (NORVASC) 5 mg tablet TAKE 1 TABLET (5 MG TOTAL) BY MOUTH DAILY. 30 tablet 5    apixaban (Eliquis) 5 mg TAKE 1 TABLET BY MOUTH TWICE A  tablet 1    [employer] Magnesium Oxide 250 MG TABS Take 1 tablet (250 mg total) by mouth daily 90 tablet 3    hydroCHLOROthiazide 25 mg tablet TAKE 1 TABLET (25 MG TOTAL) BY MOUTH DAILY. 30 tablet 5    losartan (COZAAR) 100 MG tablet TAKE 1 TABLET BY MOUTH EVERY DAY 30 tablet 11    metoprolol tartrate (LOPRESSOR) 25 mg " tablet Take 1 tablet (25 mg total) by mouth every 12 (twelve) hours 180 tablet 3     No current facility-administered medications on file prior to visit.

## 2025-05-29 NOTE — PATIENT INSTRUCTIONS
Plan:  Continue AutoPAP at settings of 10-17 cmH2O  Remember to clean your mask and equipment regularly, as directed.  You should be eligible for new supplies approximately every 3-6 months, depending on your insurance coverage. Contact your Durable Medical Equipment (DME) company for new supplies as needed.  Practice good Sleep Hygiene, as outlined below.  Will write a prescription for Flonase - spray 1 spray into each nostril as-needed before bed for nasal congestion.   I have ordered a study known as a PAP Titration  This is an in-lab sleep study utilizing CPAP and/or BiPAP, where we slowly adjust the pressure in order to determine the optimal settings to treat your sleep apnea.   This test should be scheduled at your earliest convenience.   Follow up in 3-4 months.      General PAP Information:  Care and Maintenance  Headgear should be washed as needed. Daily inspection and weekly washings are recommended. Do not disassemble the straps. Machine wash in warm water, making sure to attach Velcro hooks and tabs before washing. Line dry or machine dry on a low setting.  Masks should be washed every day. Daily inspection is recommended. Leave the mask and tubing attached. Gently wash the mask with a soft cloth using warm water and mild detergent, concentrating on the mask cushion flaps. DO NOT use alcohol or bleach. Rinse thoroughly and air dry.  Tubing and headgear should be washed weekly. Daily inspection is recommended. Wash in warm water and mild detergent and rinse thoroughly. Hook the tubing to the machine and blow until dry.  Humidifier should be washed daily and filled with DISTILLED water before use. Wash with warm water and mild detergent. Rinse thoroughly and air dry.  Disposable filters should be replaced once a month. Wash reusable foam filters with warm water and mild detergent at least once a month. Rinse thoroughly and dry with paper towels.  Avoid  that contain fragrance or conditioners, as  these will leave a residue.  NEVER iron any soft goods.      CMS Requirements    Your insurance requires a face-to-face follow up visit within a 31-90 day period after starting CPAP.  Your insurance requires compliance with CPAP, which is at least 4 hours per night for 70% of the time. This must be done over a 30 day period and must occur within the initial 31-90 day period after starting CPAP.  Your insurance also requires at least yearly follow ups to continue to pay for CPAP supplies.       PAP Supply Guidelines    Below are the guidelines for reordering your supplies. You will be responsible for your deductible, co payments, and out of pocket expenses.    Item Frequency   Nasal Mask (no headgear) 1 every 3 months   Nasal Mask Cushion 1 every 2 weeks   Full Face Mask (no headgear) 1 every 3 months   Full Face Mask Cushion 1 every month   Nasal Pillows Cushion 1 every 2 weeks   Headgear 1 every 6 months   hin Strap 1 every 6 months   rachael 1 every 3 months   Filters: Reusable 1 every 6 months   Filters: Disposable 1 every 2 weeks   Humidifier Chamber(disposable) 1 every 6 months           Good Sleep Hygiene  Wake up at the same time every day, even on the weekends.  Use your bed for sleep and intimacy only.  If you have been in bed awake for 30 minutes, get up and leave the bedroom. Choose a dull activity not involving a blue screen (TV, computer, handheld devices). Go back to bed when you feel sleepy.  Avoid caffeine, nicotine and alcohol before you go to bed.  Avoid large meals before you go to bed.  Avoid using screens (computers, tablets, smartphones, etc.) for at least 1 hour before bedtime  Exercise regularly, but do not exercise right before you go to bed.  Avoid daytime naps. If you do take a nap, sleep for 20-40 minutes, and not after 2pm.

## 2025-05-29 NOTE — ASSESSMENT & PLAN NOTE
Dominic is a pleasant 69-year-old gentleman with a PMHx of atrial fibrillation and flutter, HTN, coagulation disorder, HLD who presents in follow up for CADY (AHI 40, O2 luis fernando 87% 4/2021).  While he endorses substantial benefit to PAP therapy, unfortunately, his residual AHI is still quite high.  Moreover, his device is flagging some Cheyne-Thapa respirations.    Compliance report reviewed and analyzed  Continue AutoPAP at settings of 10-17 cmH2O  Order placed for a formal mask fitting   Order placed for a formal PAP Titration Study ; if simply adjusting/changing his mask is enough to correct the AHI, we could always cancel this.  Prescription for new supplies ordered today  Reviewed CMS/insurance requirements and resupply guidelines  Information provided on the above as well as general maintenance steps  Recommended maintaining good Sleep Hygiene    Orders:    PAP DME Resupply/Reorder    fluticasone (FLONASE) 50 mcg/act nasal spray; 1 spray into each nostril daily at bedtime as needed for rhinitis    Mask fitting only; Future    CPAP Titration Study; Future

## 2025-05-29 NOTE — TELEPHONE ENCOUNTER
Received call from patient who would like to confirm with Michelle that he can take Flonase nasal spray, which his sleep medicine doctor just prescribed for him. Please advise.

## 2025-05-29 NOTE — ASSESSMENT & PLAN NOTE
Reviewed the importance of treating SDB on cardiovascular risk reduction (including but not limited to impaired BP control, risk of heart attack, CHF, and both initial occurrence of A-fib and recurrence of A-fib following ablation or similar intervention)  Defer primary/medical management of patient's atrial fibrillation per cardiology and/or patient's PCP.

## 2025-06-02 ENCOUNTER — TELEPHONE (OUTPATIENT)
Dept: SLEEP CENTER | Facility: CLINIC | Age: 70
End: 2025-06-02

## 2025-06-02 NOTE — TELEPHONE ENCOUNTER
RX for resupply sent to [facility] via fax [phone]

## 2025-06-02 NOTE — TELEPHONE ENCOUNTER
Return call to patient and confirmed supply reorder was sent to [facility] and that mask he was already given is his to keep. Call back number left [phone].

## 2025-06-02 NOTE — TELEPHONE ENCOUNTER
Pt called the neurology clinical line. He requested to ask that his order for CPAP supplies be sent to [facility]. Made him aware that per the previous note, this was done. Pt also stated that during the OV with the provider he was given a loaner mask. Pt asked if he needed to return it. Made the pt aware that a message would be sent to the Sleep Medicine clinical team, and will request that they follow up with him directly regarding his questions.  CB# [phone] - okay to leave a detailed message on voice mail.

## 2025-06-20 DIAGNOSIS — G47.33 OSA (OBSTRUCTIVE SLEEP APNEA): ICD-10-CM

## 2025-06-20 DIAGNOSIS — R09.81 NASAL CONGESTION: ICD-10-CM

## 2025-06-20 RX ORDER — FLUTICASONE PROPIONATE 50 MCG
1 SPRAY, SUSPENSION (ML) NASAL
Qty: 48 ML | Refills: 4 | Status: SHIPPED | OUTPATIENT
Start: 2025-06-20 | End: 2026-06-20

## 2025-06-20 NOTE — TELEPHONE ENCOUNTER
Last office visit 5/29/2025.  Next office visit 9/18/2025.    Dr. Jimenez, Please see pharmacy requested Rx changes and sign if appropriate.  Thank you.

## 2025-07-09 ENCOUNTER — OFFICE VISIT (OUTPATIENT)
Dept: CARDIOLOGY CLINIC | Facility: CLINIC | Age: 70
End: 2025-07-09
Payer: COMMERCIAL

## 2025-07-09 VITALS
DIASTOLIC BLOOD PRESSURE: 70 MMHG | WEIGHT: 235 LBS | HEART RATE: 61 BPM | BODY MASS INDEX: 31.83 KG/M2 | SYSTOLIC BLOOD PRESSURE: 154 MMHG | HEIGHT: 72 IN

## 2025-07-09 DIAGNOSIS — I48.92 ATRIAL FIBRILLATION AND FLUTTER (HCC): ICD-10-CM

## 2025-07-09 DIAGNOSIS — I48.91 ATRIAL FIBRILLATION AND FLUTTER (HCC): ICD-10-CM

## 2025-07-09 LAB
ATRIAL RATE: 61 BPM
P AXIS: 39 DEGREES
PR INTERVAL: 152 MS
QRS AXIS: -49 DEGREES
QRSD INTERVAL: 128 MS
QT INTERVAL: 472 MS
QTC INTERVAL: 476 MS
T WAVE AXIS: 40 DEGREES
VENTRICULAR RATE: 61 BPM

## 2025-07-09 PROCEDURE — 99205 OFFICE O/P NEW HI 60 MIN: CPT | Performed by: INTERNAL MEDICINE

## 2025-07-09 PROCEDURE — 93000 ELECTROCARDIOGRAM COMPLETE: CPT | Performed by: INTERNAL MEDICINE

## 2025-07-09 NOTE — PROGRESS NOTES
HEART AND VASCULAR  CARDIAC ELECTROPHYSIOLOGY   HEART RHYTHM CENTER  [facility]    Outpatient New Consult    Today's Date: 07/09/25        Patient name: Dominic Ndiaye  YOB: 1955  Sex: male         Chief Complaint: Referral for afib/flutter      ASSESSMENT:  Problem List Items Addressed This Visit          Cardiovascular and Mediastinum    Atrial fibrillation and flutter (HCC)    Relevant Orders    POCT ECG (Completed)     70 yo male  1) Persistent afib and flutter had DCCV April 2025 for rapid afib 139bpm and had one 1/3/21 for typical Flutter RVR.   Asymptomatic. EF is normal. Mild LAE, HTN, CADY. No ETOH.   On Eliquis and metop.         PLAN:  Recommend ablation as he will recur again at risk of cardiomyopathy.   Recommend Atrial fibrillation and/or flutter ablation.  Additional arrhythmias may also be targeted. Transesophageal echocardiogram maybe used to rule out thrombus. Risks and benefits discussed with patient and family. Explained risk of rare but serious complications like heart perforation, stroke, heart attack, kidney injury. Energy source for ablation my vary including radiofrequency, pulsed field (PFA), or cryoablation. PFA ablation does not have known esophageal injury risk so general safer from this life threatening complication. Risks of phrenic nerve (diaphragm) and lung injury also is less with this energy source.    Explained ablation for atrial fibrillation is treatment not a cure, about 20% of patient will opt for second ablation. We went over usually recovery and expectations of going through procedure.     He wants to think about it and will call.    Strongly recommend Long term monitor can do SmartWatch, Kardia or Loop, he is completely asymptomatic in afib so high risk for cardiomyopathy.       Orders Placed This Encounter   Procedures    POCT ECG     There are no discontinued  medications.      .............................................................................................    HPI/Subjective:   68 yo male  1) Persistent afib and flutter had DCCV April 2025 for rapid afib 139bpm and had one 1/3/21 for typical Flutter RVR.   Asymptomatic. EF is normal. Mild LAE, HTN, CADY. No ETOH.   On Eliquis and metop.     He saw Michelle Duran in f/u [facility] and was in afib w RVR, he had no idea, she set up DCCV, back to NSR, feels no different.  THis was second DCCV so referred to EP.       Please note HPI is listed by problem with with update following it, it is copied again in the assessment above and reflects medical decision making as well.       Complete 12 point ROS reviewed and otherwise non pertinent or negative except as per HPI pertinent positives in Cardiovascular and Respiratory emphasized. Please see paper chart for outpatient clinic patients where the patient completed the 12 point ROS survey.           Past Medical History[1]    Allergies[2]  I reviewed the Home Medication list and Allergies in the chart.   Scheduled Meds:Current Medications[3]  PRN Meds:.        Family History[4]    Social History     Socioeconomic History    Marital status: /Civil Union     Spouse name: Not on file    Number of children: Not on file    Years of education: Not on file    Highest education level: Not on file   Occupational History    Occupation: Retired     Occupation:      Comment: Works part time since retiring   Tobacco Use    Smoking status: Never    Smokeless tobacco: Never   Vaping Use    Vaping status: Never Used   Substance and Sexual Activity    Alcohol use: Not Currently    Drug use: Never    Sexual activity: Not on file     Comment: Defer   Other Topics Concern    Not on file   Social History Narrative    Not on file     Social Drivers of Health     Financial Resource Strain: Low Risk  (8/17/2023)    Received from [employer]    Financial Resource Strain      Do you have any trouble paying for your medications, or do you think you might in the future?: No     Does your family have trouble paying for medicine? (Household - for ages 0-17 years): Not on file   Food Insecurity: No Food Insecurity (8/17/2023)    Received from [employer]    Hunger Vital Sign     Within the past 12 months, you worried that your food would run out before you got the money to buy more.: Never true     Within the past 12 months, the food you bought just didn't last and you didn't have money to get more.: Never true   Transportation Needs: No Transportation Needs (8/17/2023)    Received from [employer]    Transportation Needs     READ ONLY Do you have trouble getting a ride to medical visits or work?: Never True     Does your family have a hard time getting a ride to doctors’ visits? (Household - for ages 0-17 years): Not on file     Has lack of transportation kept you from medical appointments, meetings, work, or from getting things needed for daily living? Check all that apply. (Adult - for ages 18 years and over): Not on file     Do you (or your family) have trouble finding or paying for a ride (transportation)? (Household - for ages 0-17 years): Not on file   Physical Activity: Not on file   Stress: Not on file   Social Connections: Unknown (8/18/2024)    Received from [facility] [employer]    Social Connections     How often do you feel lonely or isolated from those around you? (Adult - for ages 18 years and over): Not on file   Intimate Partner Violence: Not on file   Housing Stability: Low Risk  (8/17/2023)    Received from [employer]    Housing Stability     Do you currently live in a shelter or have no steady place to sleep at night?: No     READ ONLY Do you think you are at risk of becoming homeless?: No     Does your family worry about paying for your home or becoming homeless? (Household - for ages 0-17 years): Not on file     Are you homeless or worried that you might be in the future? (Adult -  for ages 18 years and over): Not on file     Are you (or your family) homeless or worried that you might be in the future? (Household - for ages 0-17 years): Not on file         OBJECTIVE:    /70 (BP Location: Left arm, Patient Position: Sitting, Cuff Size: Standard)   Pulse 61   Ht 6' (1.829 m)   Wt 107 kg (235 lb)   BMI 31.87 kg/m²   Vitals:    07/09/25 1010   Weight: 107 kg (235 lb)     GEN: No acute distress, Alert and oriented, well appearing  HEENT:External ears normal, oral pharynx clear, mucous membranes moist  EYES: Pupils equal, sclera anicteric, midline, normal conjuctiva  NECK: No JVD, supple, no obvious masses or thryomegaly or goiter  CARDIOVASCULAR:  RRR, No murmur, rub, gallops S1,S2  LUNGS: Clear To auscultation bilaterally, normal effort, no rales, rhonchi, crackles   ABDOMEN:  nondistended,  without obvious organomegaly or ascites  EXTREMITIES/VASCULAR:  No edema. warm an well perfused.  PSYCH: Normal Affect,  linear speech pattern without evidence of psychosis.   NEURO: Grossly intact, moving all extremiteis equal, face symmetric, alert and responsive, no obvious focal defecits   GAIT:  Ambulates normally without difficulty  HEME: No bleeding, bruising, petechia, purpura   SKIN: No significant rashes on visibile skin, warm, no diaphoresis or pallor.     Lab Results:       LABS:      Chemistry        Component Value Date/Time    K 4.3 04/03/2025 1103    K 4.6 08/22/2024 0846     04/03/2025 1103    CL 99 08/22/2024 0846    CO2 29 04/03/2025 1103    CO2 27 08/22/2024 0846    BUN 20 04/03/2025 1103    BUN 16 08/22/2024 0846    CREATININE 0.94 04/03/2025 1103    CREATININE 0.9 08/22/2024 0846        Component Value Date/Time    CALCIUM 9.7 04/03/2025 1103    CALCIUM 10 08/22/2024 0846    ALKPHOS 43 04/03/2025 1103    ALKPHOS 62 08/22/2024 0846    AST 18 04/03/2025 1103    AST 21 08/22/2024 0846    ALT 21 04/03/2025 1103    ALT 28 08/22/2024 0846            No results found for:  "\"CHOL\"  No results found for: \"HDL\"  No results found for: \"LDLCALC\"  No results found for: \"TRIG\"  No results found for: \"CHOLHDL\"    IMAGING: No results found.     Cardiac testing:   No results found for this or any previous visit.    Results for orders placed during the hospital encounter of 01/02/21    BONG    Narrative  Procedure: BONG/ DCCV    Indication: Atrial flutter    :  Nini Flynn DO      Anti-coagulation: Eliquis    Anesthesia: Conscious sedation provided by anesthesiology with propofol.    BONG findings:  No LISHA thrombus.    Electrical Pad Placement: Anteroposteriorly interscapular region and upper sternum.  Successful cardioversion following a single synchronized biphasic shock at 100 J.    Complications: None    Post Procedure Findings: Sinus bradycardia post procedure.    No results found for this or any previous visit.    No results found for this or any previous visit.          I reviewed and interpreted the following LABS/EKG/TELE/IMAGING and below is summary of my interpretation (if data available):    LABS: nl cr    Current EKG and Rhythm Strip: NSR    Past EKGs and RHYTHM strip: afib RVR April 2024 Jan 2021 Typical flutter          ECHO images reviewed and this is my interpretation: Normal EF, valves, mild LAE               [1]   Past Medical History:  Diagnosis Date    Atrial flutter (HCC)     Hypertension     Mixed hyperlipidemia     CADY (obstructive sleep apnea)    [2] No Known Allergies  [3]   Current Outpatient Medications   Medication Sig Dispense Refill    amLODIPine (NORVASC) 5 mg tablet TAKE 1 TABLET (5 MG TOTAL) BY MOUTH DAILY. 30 tablet 5    apixaban (Eliquis) 5 mg TAKE 1 TABLET BY MOUTH TWICE A  tablet 1    [employer] Magnesium Oxide 250 MG TABS Take 1 tablet (250 mg total) by mouth daily 90 tablet 3    fluticasone (FLONASE) 50 mcg/act nasal spray 1 SPRAY INTO EACH NOSTRIL DAILY AT BEDTIME AS NEEDED FOR RHINITIS 48 mL 4    hydroCHLOROthiazide 25 mg tablet TAKE 1 TABLET " (25 MG TOTAL) BY MOUTH DAILY. 30 tablet 5    losartan (COZAAR) 100 MG tablet TAKE 1 TABLET BY MOUTH EVERY DAY 30 tablet 11    metoprolol tartrate (LOPRESSOR) 25 mg tablet Take 1 tablet (25 mg total) by mouth every 12 (twelve) hours 180 tablet 3     No current facility-administered medications for this visit.   [4]   Family History  Problem Relation Name Age of Onset    No Known Problems Mother      Hypertension Father

## 2025-07-10 ENCOUNTER — PREP FOR PROCEDURE (OUTPATIENT)
Dept: CARDIOLOGY CLINIC | Facility: CLINIC | Age: 70
End: 2025-07-10

## 2025-07-10 ENCOUNTER — TELEPHONE (OUTPATIENT)
Dept: CARDIOLOGY CLINIC | Facility: CLINIC | Age: 70
End: 2025-07-10

## 2025-07-10 DIAGNOSIS — I48.92 ATRIAL FIBRILLATION AND FLUTTER (HCC): Primary | ICD-10-CM

## 2025-07-10 DIAGNOSIS — I48.91 ATRIAL FIBRILLATION AND FLUTTER (HCC): Primary | ICD-10-CM

## 2025-07-10 NOTE — TELEPHONE ENCOUNTER
"Patient scheduled for BONG/AFIB PFA/AFLUTTER Ablation on 10/27/25 at [facility] with Dr. Claire.      Mailed patient instructions and mychart.     Patient aware of all general instructions.    Medication holds:   Hydrochlorothiazide - Do not take morning of procedure.    Losartan - Do not take day before and morning of procedure.     Blood Thinners:  Eliquis - Keep taking and do not stop.      Labs to be done on 10/13/25:  CMP / CBC (FASTING 8 HOURS)    BONG ordered/completed.      Thank you,  Sary \"Zaida\" Vignesh    "

## 2025-07-10 NOTE — LETTER
7/10/2025                                                                                                                         CARDIAC ABLATION INSTRUCTIONS      Dominic Ndiaye                     : 1955                      MRN: [id number]  [address]  [place] [address]     Procedure: BONG/AFIB PFA/AFLUTTER ABLATION      Procedure Date: 10/27/25     Location: [facility]  Address: [address] [place] [address], [place] [address]                                 Labs to be done on 10/13/25: CMP /  CBC (FASTING 8 HOURS)     BLOOD THINNER INSTRUCTIONS (Coumadin / Warfarin / Pradaxa / Eliquis / Xarelto):      Eliquis - Keep taking and do not stop.       Medication Hold:      Hydrochlorothiazide - Do not take morning of procedure.     Losartan - Do not take day before and morning of procedure.      Arrival Time: The Hospital will contact you the business day prior to your procedure, usually between 4PM - 6PM to instruct you on the time and place to report. If you do not hear from a [facility]  by 5PM please contact the [facility] at [phone].     DO NOT eat or drink ANYTHING after midnight the night prior to your procedure including gum & candy.      You may have a SIP of WATER with your morning medications, UNLESS ADVISE OTHERWISE.      Please notify us if you have been prescribed a NEW MEDICATION prior to your procedure or admitted to the hospital within 30 days.      If you develop a cold, sore throat, fever or any other illness prior to your procedure date, notify your surgeon immediately.     Arrange for a responsible adult to drive you to and from the hospital.     DO NOT stop taking Plavix or Aspirin unless advised otherwise.      If you are currently taking Fish Oil, Krill oil and/or Vitamin E please DO NOT TAKE FOR A WEEK PRIOR TO PROCEDURE.      If you are diabetic, DO NOT take any of your diabetic pills the morning of your procedure. Oral diabetic  "medications may include Glucophage, Prandin, Glyburide, Micronase, Avandia, Glucovance, Precose, Glynase, and Starlix.      Bring a list of daily medications, vitamins, minerals, herbals and nutritional supplements you take. Please include dosages and the times you take them each day.      If you are packing an overnight bag, pack minimal clothing, you will be given hospital sleepwear.   DO NOT bring money, valuables or jewelry. The wedding band is ok.      If you use CPAP machine, bring it to the hospital.       Bring your Photo ID and Insurance cards with you.         FAILURE TO FOLLOW ANY OF THESE INSTRUCTIONS COULD RESULT IN THE CANCELLATION OF YOUR PROCEDURE        Please call [phone] if you do not hear from the  by 5:00PM the night before your procedure.     All patients enter through ENTRANCE B.  Parking is available free of charge or park on Parking Deck B.           Thank you,   Sary \"Zaida\" Vignesh    [facility]   [address] [place] [id number]  Teams: [phone]    "

## 2025-07-10 NOTE — TELEPHONE ENCOUNTER
Patient called and would like to proceed with Ablation procedure with Dr. Claire, can someone please give him a call to schedule? Thanks!

## 2025-07-10 NOTE — TELEPHONE ENCOUNTER
"Returned call to patient to schedule BONG/AFIB PFA/AFLUTTER ABL and provided dates available for October and patient will run them by his wife who will be driving him to the appt to see which works best for her and call back to schedule procedure.     Thanks,  Sary \"Zaida\" Vignesh     "

## 2025-07-10 NOTE — TELEPHONE ENCOUNTER
"Delfina,    Please mail instructions/labs to patient's home address on file.     Thanks,  Sary \"Zaida\" Vignesh     "

## 2025-08-07 ENCOUNTER — TELEPHONE (OUTPATIENT)
Age: 70
End: 2025-08-07